# Patient Record
Sex: MALE | Race: OTHER | HISPANIC OR LATINO | Employment: OTHER | URBAN - METROPOLITAN AREA
[De-identification: names, ages, dates, MRNs, and addresses within clinical notes are randomized per-mention and may not be internally consistent; named-entity substitution may affect disease eponyms.]

---

## 2017-07-19 ENCOUNTER — HOSPITAL ENCOUNTER (EMERGENCY)
Facility: HOSPITAL | Age: 67
Discharge: HOME | End: 2017-07-20
Attending: EMERGENCY MEDICINE

## 2017-07-19 ENCOUNTER — APPOINTMENT (EMERGENCY)
Dept: RADIOLOGY | Facility: HOSPITAL | Age: 67
End: 2017-07-19
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
## 2017-07-19 LAB
ALBUMIN SERPL BCP-MCNC: 4 G/DL (ref 3.5–5)
ALP SERPL-CCNC: 66 U/L (ref 46–116)
ALT SERPL W P-5'-P-CCNC: 16 U/L (ref 12–78)
ANION GAP SERPL CALCULATED.3IONS-SCNC: 7 MMOL/L (ref 4–13)
AST SERPL W P-5'-P-CCNC: 6 U/L (ref 5–45)
BASOPHILS # BLD AUTO: 0.03 THOUSANDS/ΜL (ref 0–0.1)
BASOPHILS NFR BLD AUTO: 0 % (ref 0–1)
BILIRUB SERPL-MCNC: 0.23 MG/DL (ref 0.2–1)
BUN SERPL-MCNC: 35 MG/DL (ref 5–25)
CALCIUM SERPL-MCNC: 9.5 MG/DL (ref 8.3–10.1)
CHLORIDE SERPL-SCNC: 108 MMOL/L (ref 100–108)
CO2 SERPL-SCNC: 25 MMOL/L (ref 21–32)
CREAT SERPL-MCNC: 1.93 MG/DL (ref 0.6–1.3)
EOSINOPHIL # BLD AUTO: 0.21 THOUSAND/ΜL (ref 0–0.61)
EOSINOPHIL NFR BLD AUTO: 3 % (ref 0–6)
ERYTHROCYTE [DISTWIDTH] IN BLOOD BY AUTOMATED COUNT: 12.6 % (ref 11.6–15.1)
GFR SERPL CREATININE-BSD FRML MDRD: 34.8 ML/MIN/1.73SQ M
GLUCOSE SERPL-MCNC: 125 MG/DL (ref 65–140)
HCT VFR BLD AUTO: 33.4 % (ref 36.5–49.3)
HGB BLD-MCNC: 10.9 G/DL (ref 12–17)
LIPASE SERPL-CCNC: 87 U/L (ref 73–393)
LYMPHOCYTES # BLD AUTO: 2.99 THOUSANDS/ΜL (ref 0.6–4.47)
LYMPHOCYTES NFR BLD AUTO: 38 % (ref 14–44)
MCH RBC QN AUTO: 29.2 PG (ref 26.8–34.3)
MCHC RBC AUTO-ENTMCNC: 32.6 G/DL (ref 31.4–37.4)
MCV RBC AUTO: 90 FL (ref 82–98)
MONOCYTES # BLD AUTO: 0.52 THOUSAND/ΜL (ref 0.17–1.22)
MONOCYTES NFR BLD AUTO: 7 % (ref 4–12)
NEUTROPHILS # BLD AUTO: 4.19 THOUSANDS/ΜL (ref 1.85–7.62)
NEUTS SEG NFR BLD AUTO: 52 % (ref 43–75)
NRBC BLD AUTO-RTO: 0 /100 WBCS
PLATELET # BLD AUTO: 177 THOUSANDS/UL (ref 149–390)
PMV BLD AUTO: 12.2 FL (ref 8.9–12.7)
POTASSIUM SERPL-SCNC: 4.4 MMOL/L (ref 3.5–5.3)
PROT SERPL-MCNC: 7.2 G/DL (ref 6.4–8.2)
RBC # BLD AUTO: 3.73 MILLION/UL (ref 3.88–5.62)
SODIUM SERPL-SCNC: 140 MMOL/L (ref 136–145)
TROPONIN I SERPL-MCNC: 0.05 NG/ML
WBC # BLD AUTO: 7.95 THOUSAND/UL (ref 4.31–10.16)

## 2017-07-19 PROCEDURE — 83690 ASSAY OF LIPASE: CPT | Performed by: EMERGENCY MEDICINE

## 2017-07-19 PROCEDURE — 36415 COLL VENOUS BLD VENIPUNCTURE: CPT

## 2017-07-19 PROCEDURE — 84484 ASSAY OF TROPONIN QUANT: CPT | Performed by: EMERGENCY MEDICINE

## 2017-07-19 PROCEDURE — 70450 CT HEAD/BRAIN W/O DYE: CPT

## 2017-07-19 PROCEDURE — 85025 COMPLETE CBC W/AUTO DIFF WBC: CPT | Performed by: EMERGENCY MEDICINE

## 2017-07-19 PROCEDURE — 71020 HB CHEST X-RAY 2VW FRONTAL&LATL: CPT

## 2017-07-19 PROCEDURE — 80053 COMPREHEN METABOLIC PANEL: CPT | Performed by: EMERGENCY MEDICINE

## 2017-07-19 PROCEDURE — 93005 ELECTROCARDIOGRAM TRACING: CPT | Performed by: EMERGENCY MEDICINE

## 2017-07-20 VITALS
DIASTOLIC BLOOD PRESSURE: 59 MMHG | RESPIRATION RATE: 18 BRPM | SYSTOLIC BLOOD PRESSURE: 132 MMHG | HEART RATE: 64 BPM | OXYGEN SATURATION: 97 % | WEIGHT: 180 LBS

## 2017-07-20 LAB
ATRIAL RATE: 69 BPM
HOLD SPECIMEN: NORMAL
P AXIS: 75 DEGREES
PR INTERVAL: 158 MS
QRS AXIS: 74 DEGREES
QRSD INTERVAL: 88 MS
QT INTERVAL: 408 MS
QTC INTERVAL: 437 MS
T WAVE AXIS: 91 DEGREES
TROPONIN I SERPL-MCNC: 0.04 NG/ML
VENTRICULAR RATE: 69 BPM

## 2017-07-20 PROCEDURE — 84484 ASSAY OF TROPONIN QUANT: CPT | Performed by: EMERGENCY MEDICINE

## 2017-07-20 PROCEDURE — 36415 COLL VENOUS BLD VENIPUNCTURE: CPT | Performed by: EMERGENCY MEDICINE

## 2017-07-20 PROCEDURE — 99285 EMERGENCY DEPT VISIT HI MDM: CPT

## 2019-10-20 ENCOUNTER — HOSPITAL ENCOUNTER (OUTPATIENT)
Facility: HOSPITAL | Age: 69
Setting detail: OBSERVATION
Discharge: HOME | End: 2019-10-22
Attending: EMERGENCY MEDICINE | Admitting: FAMILY MEDICINE
Payer: COMMERCIAL

## 2019-10-20 DIAGNOSIS — R77.8 ELEVATED TROPONIN: Primary | ICD-10-CM

## 2019-10-20 DIAGNOSIS — S71.001A: ICD-10-CM

## 2019-10-20 DIAGNOSIS — I10 HYPERTENSION, UNCONTROLLED: ICD-10-CM

## 2019-10-20 DIAGNOSIS — S71.002A: ICD-10-CM

## 2019-10-20 PROCEDURE — 99285 EMERGENCY DEPT VISIT HI MDM: CPT

## 2019-10-20 PROCEDURE — 99285 EMERGENCY DEPT VISIT HI MDM: CPT | Performed by: EMERGENCY MEDICINE

## 2019-10-21 ENCOUNTER — APPOINTMENT (EMERGENCY)
Dept: CT IMAGING | Facility: HOSPITAL | Age: 69
End: 2019-10-21
Payer: COMMERCIAL

## 2019-10-21 PROBLEM — K21.9 GERD (GASTROESOPHAGEAL REFLUX DISEASE): Status: ACTIVE | Noted: 2019-10-21

## 2019-10-21 PROBLEM — I10 HYPERTENSION: Status: ACTIVE | Noted: 2019-10-21

## 2019-10-21 PROBLEM — R11.10 VOMITING: Status: ACTIVE | Noted: 2019-10-21

## 2019-10-21 PROBLEM — G20 PARKINSON DISEASE (HCC): Status: ACTIVE | Noted: 2019-10-21

## 2019-10-21 PROBLEM — E78.5 HYPERLIPIDEMIA: Status: ACTIVE | Noted: 2019-10-21

## 2019-10-21 PROBLEM — S71.001A OPEN WOUND OF RIGHT HIP: Status: ACTIVE | Noted: 2019-10-21

## 2019-10-21 PROBLEM — K59.00 CONSTIPATION: Status: ACTIVE | Noted: 2019-10-21

## 2019-10-21 PROBLEM — R77.8 ELEVATED TROPONIN: Status: ACTIVE | Noted: 2019-10-21

## 2019-10-21 PROBLEM — S71.002A: Status: ACTIVE | Noted: 2019-10-21

## 2019-10-21 LAB
ALBUMIN SERPL BCP-MCNC: 4 G/DL (ref 3.5–5)
ALP SERPL-CCNC: 95 U/L (ref 46–116)
ALT SERPL W P-5'-P-CCNC: 15 U/L (ref 12–78)
ANION GAP SERPL CALCULATED.3IONS-SCNC: 9 MMOL/L (ref 4–13)
AST SERPL W P-5'-P-CCNC: 15 U/L (ref 5–45)
BASOPHILS # BLD AUTO: 0.05 THOUSANDS/ΜL (ref 0–0.1)
BASOPHILS NFR BLD AUTO: 1 % (ref 0–1)
BILIRUB SERPL-MCNC: 0.33 MG/DL (ref 0.2–1)
BUN SERPL-MCNC: 19 MG/DL (ref 5–25)
CALCIUM SERPL-MCNC: 9 MG/DL (ref 8.3–10.1)
CHLORIDE SERPL-SCNC: 101 MMOL/L (ref 100–108)
CO2 SERPL-SCNC: 27 MMOL/L (ref 21–32)
CREAT SERPL-MCNC: 1.44 MG/DL (ref 0.6–1.3)
EOSINOPHIL # BLD AUTO: 0.17 THOUSAND/ΜL (ref 0–0.61)
EOSINOPHIL NFR BLD AUTO: 2 % (ref 0–6)
ERYTHROCYTE [DISTWIDTH] IN BLOOD BY AUTOMATED COUNT: 11.9 % (ref 11.6–15.1)
GFR SERPL CREATININE-BSD FRML MDRD: 49 ML/MIN/1.73SQ M
GLUCOSE SERPL-MCNC: 151 MG/DL (ref 65–140)
HCT VFR BLD AUTO: 34.5 % (ref 36.5–49.3)
HGB BLD-MCNC: 11.2 G/DL (ref 12–17)
IMM GRANULOCYTES # BLD AUTO: 0.03 THOUSAND/UL (ref 0–0.2)
IMM GRANULOCYTES NFR BLD AUTO: 0 % (ref 0–2)
LACTATE SERPL-SCNC: 1.6 MMOL/L (ref 0.5–2)
LIPASE SERPL-CCNC: 84 U/L (ref 73–393)
LYMPHOCYTES # BLD AUTO: 1.52 THOUSANDS/ΜL (ref 0.6–4.47)
LYMPHOCYTES NFR BLD AUTO: 18 % (ref 14–44)
MCH RBC QN AUTO: 29.8 PG (ref 26.8–34.3)
MCHC RBC AUTO-ENTMCNC: 32.5 G/DL (ref 31.4–37.4)
MCV RBC AUTO: 92 FL (ref 82–98)
MONOCYTES # BLD AUTO: 0.45 THOUSAND/ΜL (ref 0.17–1.22)
MONOCYTES NFR BLD AUTO: 5 % (ref 4–12)
NEUTROPHILS # BLD AUTO: 6.09 THOUSANDS/ΜL (ref 1.85–7.62)
NEUTS SEG NFR BLD AUTO: 74 % (ref 43–75)
NRBC BLD AUTO-RTO: 0 /100 WBCS
PLATELET # BLD AUTO: 223 THOUSANDS/UL (ref 149–390)
PMV BLD AUTO: 11.5 FL (ref 8.9–12.7)
POTASSIUM SERPL-SCNC: 4.8 MMOL/L (ref 3.5–5.3)
PROT SERPL-MCNC: 7.5 G/DL (ref 6.4–8.2)
RBC # BLD AUTO: 3.76 MILLION/UL (ref 3.88–5.62)
SODIUM SERPL-SCNC: 137 MMOL/L (ref 136–145)
TROPONIN I SERPL-MCNC: 0.11 NG/ML
TROPONIN I SERPL-MCNC: 0.12 NG/ML
TROPONIN I SERPL-MCNC: 0.13 NG/ML
WBC # BLD AUTO: 8.31 THOUSAND/UL (ref 4.31–10.16)

## 2019-10-21 PROCEDURE — 96360 HYDRATION IV INFUSION INIT: CPT

## 2019-10-21 PROCEDURE — 84484 ASSAY OF TROPONIN QUANT: CPT | Performed by: PHYSICIAN ASSISTANT

## 2019-10-21 PROCEDURE — 93005 ELECTROCARDIOGRAM TRACING: CPT

## 2019-10-21 PROCEDURE — 99204 OFFICE O/P NEW MOD 45 MIN: CPT | Performed by: INTERNAL MEDICINE

## 2019-10-21 PROCEDURE — 92610 EVALUATE SWALLOWING FUNCTION: CPT

## 2019-10-21 PROCEDURE — 83690 ASSAY OF LIPASE: CPT | Performed by: EMERGENCY MEDICINE

## 2019-10-21 PROCEDURE — 80053 COMPREHEN METABOLIC PANEL: CPT | Performed by: EMERGENCY MEDICINE

## 2019-10-21 PROCEDURE — 36415 COLL VENOUS BLD VENIPUNCTURE: CPT | Performed by: EMERGENCY MEDICINE

## 2019-10-21 PROCEDURE — 74177 CT ABD & PELVIS W/CONTRAST: CPT

## 2019-10-21 PROCEDURE — 83605 ASSAY OF LACTIC ACID: CPT | Performed by: EMERGENCY MEDICINE

## 2019-10-21 PROCEDURE — G8998 SWALLOW D/C STATUS: HCPCS

## 2019-10-21 PROCEDURE — 85025 COMPLETE CBC W/AUTO DIFF WBC: CPT | Performed by: EMERGENCY MEDICINE

## 2019-10-21 PROCEDURE — G8997 SWALLOW GOAL STATUS: HCPCS

## 2019-10-21 PROCEDURE — G8996 SWALLOW CURRENT STATUS: HCPCS

## 2019-10-21 PROCEDURE — 84484 ASSAY OF TROPONIN QUANT: CPT | Performed by: EMERGENCY MEDICINE

## 2019-10-21 PROCEDURE — 99220 PR INITIAL OBSERVATION CARE/DAY 70 MINUTES: CPT | Performed by: STUDENT IN AN ORGANIZED HEALTH CARE EDUCATION/TRAINING PROGRAM

## 2019-10-21 RX ORDER — BISACODYL 10 MG
10 SUPPOSITORY, RECTAL RECTAL DAILY PRN
COMMUNITY
Start: 2019-08-30

## 2019-10-21 RX ORDER — BISACODYL 10 MG
10 SUPPOSITORY, RECTAL RECTAL DAILY PRN
Status: DISCONTINUED | OUTPATIENT
Start: 2019-10-21 | End: 2019-10-22 | Stop reason: HOSPADM

## 2019-10-21 RX ORDER — LISINOPRIL 10 MG/1
10 TABLET ORAL DAILY
COMMUNITY
Start: 2019-08-29 | End: 2020-10-27 | Stop reason: HOSPADM

## 2019-10-21 RX ORDER — CALCIUM CARBONATE 200(500)MG
1000 TABLET,CHEWABLE ORAL DAILY PRN
Status: DISCONTINUED | OUTPATIENT
Start: 2019-10-21 | End: 2019-10-22 | Stop reason: HOSPADM

## 2019-10-21 RX ORDER — FAMOTIDINE 20 MG/1
20 TABLET, FILM COATED ORAL DAILY
Status: DISCONTINUED | OUTPATIENT
Start: 2019-10-21 | End: 2019-10-22 | Stop reason: HOSPADM

## 2019-10-21 RX ORDER — ONDANSETRON 2 MG/ML
4 INJECTION INTRAMUSCULAR; INTRAVENOUS EVERY 6 HOURS PRN
Status: DISCONTINUED | OUTPATIENT
Start: 2019-10-21 | End: 2019-10-22 | Stop reason: HOSPADM

## 2019-10-21 RX ORDER — ACETAMINOPHEN 325 MG/1
650 TABLET ORAL EVERY 4 HOURS PRN
Status: DISCONTINUED | OUTPATIENT
Start: 2019-10-21 | End: 2019-10-22 | Stop reason: HOSPADM

## 2019-10-21 RX ORDER — POLYETHYLENE GLYCOL 3350 17 G/17G
17 POWDER, FOR SOLUTION ORAL DAILY PRN
Status: DISCONTINUED | OUTPATIENT
Start: 2019-10-21 | End: 2019-10-22 | Stop reason: HOSPADM

## 2019-10-21 RX ORDER — TRAZODONE HYDROCHLORIDE 50 MG/1
150 TABLET ORAL
COMMUNITY
Start: 2019-09-09

## 2019-10-21 RX ORDER — ROSUVASTATIN CALCIUM 20 MG/1
20 TABLET, COATED ORAL DAILY
Status: ON HOLD | COMMUNITY
Start: 2019-08-29 | End: 2020-10-23

## 2019-10-21 RX ORDER — SENNOSIDES 8.6 MG
1 TABLET ORAL
Status: DISCONTINUED | OUTPATIENT
Start: 2019-10-21 | End: 2019-10-22 | Stop reason: HOSPADM

## 2019-10-21 RX ORDER — ASPIRIN 81 MG/1
81 TABLET, CHEWABLE ORAL DAILY
Status: DISCONTINUED | OUTPATIENT
Start: 2019-10-21 | End: 2019-10-22 | Stop reason: HOSPADM

## 2019-10-21 RX ORDER — QUETIAPINE FUMARATE 25 MG/1
25 TABLET, FILM COATED ORAL
COMMUNITY
Start: 2019-07-02 | End: 2020-10-15 | Stop reason: CLARIF

## 2019-10-21 RX ORDER — ATORVASTATIN CALCIUM 40 MG/1
40 TABLET, FILM COATED ORAL
Status: DISCONTINUED | OUTPATIENT
Start: 2019-10-21 | End: 2019-10-22 | Stop reason: HOSPADM

## 2019-10-21 RX ORDER — LISINOPRIL 10 MG/1
10 TABLET ORAL DAILY
Status: DISCONTINUED | OUTPATIENT
Start: 2019-10-21 | End: 2019-10-22 | Stop reason: HOSPADM

## 2019-10-21 RX ORDER — CARVEDILOL 6.25 MG/1
3.12 TABLET ORAL 2 TIMES DAILY WITH MEALS
Status: DISCONTINUED | OUTPATIENT
Start: 2019-10-21 | End: 2019-10-22 | Stop reason: HOSPADM

## 2019-10-21 RX ORDER — TRAZODONE HYDROCHLORIDE 100 MG/1
100 TABLET ORAL
Status: DISCONTINUED | OUTPATIENT
Start: 2019-10-21 | End: 2019-10-22 | Stop reason: HOSPADM

## 2019-10-21 RX ORDER — HEPARIN SODIUM 5000 [USP'U]/ML
5000 INJECTION, SOLUTION INTRAVENOUS; SUBCUTANEOUS EVERY 8 HOURS SCHEDULED
Status: DISCONTINUED | OUTPATIENT
Start: 2019-10-21 | End: 2019-10-22 | Stop reason: HOSPADM

## 2019-10-21 RX ORDER — QUETIAPINE FUMARATE 25 MG/1
25 TABLET, FILM COATED ORAL
Status: DISCONTINUED | OUTPATIENT
Start: 2019-10-21 | End: 2019-10-22 | Stop reason: HOSPADM

## 2019-10-21 RX ORDER — LUBIPROSTONE 8 UG/1
8 CAPSULE, GELATIN COATED ORAL 2 TIMES DAILY WITH MEALS
Status: DISCONTINUED | OUTPATIENT
Start: 2019-10-21 | End: 2019-10-22 | Stop reason: HOSPADM

## 2019-10-21 RX ORDER — RANITIDINE 150 MG/1
150 TABLET ORAL DAILY
COMMUNITY
Start: 2019-06-08 | End: 2020-10-15 | Stop reason: CLARIF

## 2019-10-21 RX ADMIN — CARVEDILOL 3.12 MG: 6.25 TABLET, FILM COATED ORAL at 08:39

## 2019-10-21 RX ADMIN — ASPIRIN 81 MG 81 MG: 81 TABLET ORAL at 08:39

## 2019-10-21 RX ADMIN — HEPARIN SODIUM 5000 UNITS: 5000 INJECTION INTRAVENOUS; SUBCUTANEOUS at 05:26

## 2019-10-21 RX ADMIN — LISINOPRIL 10 MG: 10 TABLET ORAL at 08:26

## 2019-10-21 RX ADMIN — CARBIDOPA AND LEVODOPA 1 TABLET: 25; 100 TABLET ORAL at 08:26

## 2019-10-21 RX ADMIN — CARVEDILOL 3.12 MG: 6.25 TABLET, FILM COATED ORAL at 15:58

## 2019-10-21 RX ADMIN — SODIUM CHLORIDE 1000 ML: 0.9 INJECTION, SOLUTION INTRAVENOUS at 01:21

## 2019-10-21 RX ADMIN — IODIXANOL 100 ML: 320 INJECTION, SOLUTION INTRAVASCULAR at 01:13

## 2019-10-21 RX ADMIN — HEPARIN SODIUM 5000 UNITS: 5000 INJECTION INTRAVENOUS; SUBCUTANEOUS at 13:32

## 2019-10-21 RX ADMIN — CARBIDOPA AND LEVODOPA 1 TABLET: 25; 100 TABLET ORAL at 21:00

## 2019-10-21 RX ADMIN — ATORVASTATIN CALCIUM 40 MG: 40 TABLET, FILM COATED ORAL at 15:58

## 2019-10-21 RX ADMIN — QUETIAPINE FUMARATE 25 MG: 25 TABLET ORAL at 21:01

## 2019-10-21 RX ADMIN — HEPARIN SODIUM 5000 UNITS: 5000 INJECTION INTRAVENOUS; SUBCUTANEOUS at 21:02

## 2019-10-21 RX ADMIN — LUBIPROSTONE 8 MCG: 8 CAPSULE, GELATIN COATED ORAL at 08:26

## 2019-10-21 RX ADMIN — TRAZODONE HYDROCHLORIDE 100 MG: 100 TABLET ORAL at 21:01

## 2019-10-21 RX ADMIN — CARBIDOPA AND LEVODOPA 1 TABLET: 25; 100 TABLET ORAL at 15:26

## 2019-10-21 RX ADMIN — SENNOSIDES 8.6 MG: 8.6 TABLET, FILM COATED ORAL at 21:01

## 2019-10-21 RX ADMIN — FAMOTIDINE 20 MG: 20 TABLET ORAL at 08:26

## 2019-10-21 NOTE — ASSESSMENT & PLAN NOTE
Patient reportedly brought into ED for two episodes of vomiting yesterday   Likely self limiting viral illness vs  CAD vs  Association with chronic dysphagia   Continue to monitor for further episodes of vomiting   PRN Zofran   IV fluid hydration

## 2019-10-21 NOTE — SPEECH THERAPY NOTE
Speech Language/Pathology  Speech/Language Pathology  Assessment    Patient Name: Urszula Vieirajesusmorel  GRZAB'V Date: 10/21/2019     Problem List  Principal Problem:    Elevated troponin  Active Problems:    Hyperlipidemia    Hypertension    Parkinson disease (HCC)    Constipation    GERD (gastroesophageal reflux disease)    Open wound of right hip    Vomiting    Past Medical History  Past Medical History:   Diagnosis Date    [facility]     Ambulatory dysfunction     CAD (coronary artery disease)     [facility])     Constipation     Dementia ([facility] )     Diabetes mellitus ([place] [address] )     GERD (gastroesophageal reflux disease)     Hyperlipidemia     Hypertension     NSTEMI (non-ST elevated myocardial infarction) ([facility] )     Parkinson disease ([place] [address] )     Renal disorder      Past Surgical History  Past Surgical History:   Procedure Laterality Date    BACK SURGERY       Attestation signed by Laurie Sylvester MD at 10/21/2019 11:34 AM (Updated)     Split/Shared Statement  I saw/examined the patient  I agree with the Advanced Practitioner's note with the following additions/exceptions:   78year old male with parkinson's dementia, HTN, HLD, Chronic constipation, CAD, presenting with vomiting  He is non-verbal at baseline and no ROS could be performed  Does not seem to present with any signs of active sepsis  Incidentally found to have troponin elevation  Suspect that vomiting possibly due to gastroenteritis since he tolerated his AM meal  No evidence of obstruction  Will request speech and swallow eval to see if this is associated with his parkinson's disease  Will administer stool softener for now  Awaiting final cardiology recommendations         Chief Complaint:   vomiting  History of Present Illness:  Renetta Jeter is a 78 y o  male with past medical history of Parkinson's dementia, hypertension, hyperlipidemia, GERD chronic constipation, CAD who presents with vomiting  Patient nonverbal at baseline, HPI obtained through chart review  Patient was reportedly brought to ED by family for 2 episodes of vomiting today  Patient also noted to have wound on lateral right thigh which has been intermittently draining  Family reports patient is approximately at mental baseline  Denies recent fever or chills  Patient smiles and makes appropriate eye contact on exam     Per esophagram [facility] 8/12/19:  Thoracic esophagus:  Normal primary esophageal peristalsis  However, there are increased tertiary  waves seen in the thoracic esophagus as well  No annular obstructing lesion,  ulceration, thickened folds, persistent intraluminal filling defect, hiatal  hernia or gastroesophageal reflux  IMPRESSION:  Impression:  1  Mild dysmotility with increased tertiary waves  2  No annular obstructing lesion, ulceration or gastroesophageal reflux  VBS 2/14/19 at [facility]:  ASSESSMENT  Mild oropharyngeal phase dysphagia characterized by decreased mastication, slow oral transit, oropharyngeal delay with transient penetration of thin liquids prior to swallow, no aspiration  PLAN  Therapy Assessment/Plan (SLP)  SLP Received On: 02/14/19  SLP Communication: VFSS completed 2/14/19, recommend dysphagia level 3/thins  Swallowing Diagnosis: mild, oropharyngeal phase dysphagia  SLP Diet Recommendation: Dysphagia Level 3 Advanced, Thin Liquids   DIET Recommendations:  SLP Diet Recommendation: Dysphagia Level 3 Advanced, Thin Liquids   Recommended Form of Meds:  []As best tolerated []In Nectar Thick Liquid Form []Whole in Puree [x]Crushed in Puree []Non-Oral   Precautions:  [x]Head of bed at 90* for meals/medications   [x]Supervision with P O  Close   [x]Assist with P O  Assist as Needed   Strategies:  [x]Alternate food with liquids   [x]Small sips/Bites, One at a time       Bedside Swallow Evaluation:    Summary:  Pt presents w/ prolonged mastication, otherwise oral and pharyngeal stages were wnl   Son educated on slow rate, small bites, and small sips in between  Per vbs at [facility] it was recommended that meds be crushed  Recommendations:  Diet: dysphagia 3/dental soft  Liquid:thin  Meds:crush  Supervision: complete  Positioning:Upright  Strategies: Pt to take PO/Meds only when fully alert and upright  Feed slowly, sips on between bites, keep hob elevated to ~ 30 degrees at rest   Oral care  Aspiration precautions  Reflux precautions  Eval only, No f/u tx indicated  Patient's goal: none stated, speaks very little per son    Consider consult w/:  Nutrition    Reason for consult:  R/o aspiration  Determine safest and least restrictive diet  H/o neurological disease  h/o dysphagia   ? If vomiting was po related  Current diet:  Dysphagia 3 w/ thin  Premorbid diet[de-identified]  Dysphagia 3 w/ thin  Previous VBS:  Noted above  O2 requirement:  none  Voice/Speech:  Nonverbal today  Speaks very little per son  Social:  Home w/ family  Follows commands:  poorly                  Cognitive Status:  alert  Oral mech exam:  Dentition:natural  Labial strength and ROM:appears wfl/wnl  Lingual strength and ROM:appears wfl/wnl  Mandibular strength and ROM:appears wfl/wnl  Secretion management:wnl    Items administered:  Stuffed shells, chopped broccoli  Liquids were taken by straw  Oral stage:  Mild  Lip closure:wnl  Mastication:prolonged but functional  Bolus formation: wfl/wnl  Bolus control:wfl/wnl  Transfer:wnl  Oral residue:no obvious residue  Pocketing:none    Pharyngeal stage:   WNL  Swallow promptness:+  Laryngeal rise:+  Wet voice:-  Throat clear:-  Cough:-  Secondary swallows:-  Audible swallows:-  No overt s/s aspiration    Esophageal stage:  See esophagram report above    Aspiration precautions posted    Results d/w:  Pt, nursing,physician

## 2019-10-21 NOTE — NURSING NOTE
Message sent to Dr Abbie Wilde to make aware that speech therapy recommended to crush meds, but the amitiza cannot be crushed  I therefor did not give him this evenings dose

## 2019-10-21 NOTE — PLAN OF CARE
Problem: Prexisting or High Potential for Compromised Skin Integrity  Goal: Skin integrity is maintained or improved  Description  INTERVENTIONS:  - Identify patients at risk for skin breakdown  - Assess and monitor skin integrity  - Assess and monitor nutrition and hydration status  - Monitor labs   - Assess for incontinence   - Turn and reposition patient  - Assist with mobility/ambulation  - Relieve pressure over bony prominences  - Avoid friction and shearing  - Provide appropriate hygiene as needed including keeping skin clean and dry  - Evaluate need for skin moisturizer/barrier cream  - Collaborate with interdisciplinary team   - Patient/family teaching  - Consider wound care consult   Outcome: Progressing     Problem: Potential for Falls  Goal: Patient will remain free of falls  Description  INTERVENTIONS:  - Assess patient frequently for physical needs  -  Identify cognitive and physical deficits and behaviors that affect risk of falls    -  [place] fall precautions as indicated by assessment   - Educate patient/family on patient safety including physical limitations  - Instruct patient to call for assistance with activity based on assessment  - Modify environment to reduce risk of injury  - Consider OT/PT consult to assist with strengthening/mobility  Outcome: Progressing     Problem: DISCHARGE PLANNING  Goal: Discharge to home or other facility with appropriate resources  Description  INTERVENTIONS:  - Identify barriers to discharge w/patient and caregiver  - Arrange for needed discharge resources and transportation as appropriate  - Identify discharge learning needs (meds, wound care, etc )  - Arrange for interpretive services to assist at discharge as needed  - Refer to Case Management Department for coordinating discharge planning if the patient needs post-hospital services based on physician/advanced practitioner order or complex needs related to functional status, cognitive ability, or social support system  Outcome: Progressing     Problem: Knowledge Deficit  Goal: Patient/family/caregiver demonstrates understanding of disease process, treatment plan, medications, and discharge instructions  Description  Complete learning assessment and assess knowledge base    Interventions:  - Provide teaching at level of understanding  - Provide teaching via preferred learning methods  Outcome: Progressing

## 2019-10-21 NOTE — ED PROVIDER NOTES
History  Chief Complaint   Patient presents with    Vomiting     Per pt family pt started vomiting today and had 2x episodes  Family deny any other complaints  Pt nonverbal per family  A 54-year-old male with past medical history of Alzheimer's disease, CAD, CHF, constipation, dementia, diabetes, GERD, hyperlipidemia, hypertension, Parkinson's and CKD; presents with vomiting  History is provided by the patient's family, as the patient is nonverbal secondary to his dementia  Family states last week they noticed a wound to the lateral aspect of his right leg, which has been intermittently draining pus  Family states today the patient had two episodes of nonbloody nonbilious emesis  Patient has otherwise not had fever, cough, increased work of breathing or diarrhea  Family states the patient is at his baseline mental status  Patient is essentially bed-bound, cared for by family  Complete history and review of systems are unobtainable from the patient secondary to his dementia  A/P:  Vomiting, also with a wound to the lateral aspect of the right hip  Benign abdominal exam   Concern for soft tissue infection given wound appearance, will check lab work and CT scan for further evaluation  Patient also found to be significantly hypertensive on arrival, improved once brought back into the room  Will check EKG and lab work for end-organ damage  History provided by:  Spouse and relative   used: Yes ([employer])    Vomiting       Prior to Admission Medications   Prescriptions Last Dose Informant Patient Reported? Taking?    QUEtiapine (SEROquel) 25 mg tablet 10/22/2019 at Unknown time  Yes Yes   Sig: Take 25 mg by mouth   bisacodyl (DULCOLAX) 10 mg suppository 10/21/2019 at Unknown time  Yes Yes   Sig: Insert 10 mg into the rectum daily as needed for constipation   carbidopa-levodopa (SINEMET)  mg per tablet 10/22/2019 at Unknown time  Yes Yes   Sig: Take 1 tablet by mouth Three times a day   linaCLOtide (LINZESS) 72 MCG CAPS 10/22/2019 at Unknown time  Yes Yes   Sig: Take 72 mcg by mouth daily   lisinopril (ZESTRIL) 10 mg tablet 10/22/2019 at Unknown time  Yes Yes   Sig: Take 10 mg by mouth daily   metFORMIN (GLUCOPHAGE) 1000 MG tablet 10/21/2019 at Unknown time Child Yes Yes   Sig: Take 1,000 mg by mouth daily with breakfast   ranitidine (ZANTAC) 150 mg tablet 10/22/2019 at Unknown time  Yes Yes   Sig: Take 150 mg by mouth daily   rosuvastatin (CRESTOR) 20 MG tablet 10/22/2019 at Unknown time  Yes Yes   Sig: Take 20 mg by mouth daily   traZODone (DESYREL) 50 mg tablet 10/22/2019 at Unknown time  Yes Yes   Sig: Take 100 mg by mouth daily at bedtime      Facility-Administered Medications: None       Past Medical History:   Diagnosis Date    Alzheimer disease ([place] [address] )     Ambulatory dysfunction     CAD (coronary artery disease)     [facility])     Constipation     Dementia ([place] [address] )     Diabetes mellitus ([place] [address] )     GERD (gastroesophageal reflux disease)     Hyperlipidemia     Hypertension     NSTEMI (non-ST elevated myocardial infarction) ([place] [address] )     Parkinson disease ([place] [address] )     Renal disorder        Past Surgical History:   Procedure Laterality Date    BACK SURGERY         History reviewed  No pertinent family history  I have reviewed and agree with the history as documented  Social History     Tobacco Use    Smoking status: Former Smoker   Substance Use Topics    Alcohol use: Not Currently    Drug use: Never        Review of Systems   Unable to perform ROS: Dementia   Gastrointestinal: Positive for vomiting  Physical Exam  Physical Exam  General Appearance: awake and alert, nonverbal at baseline; nad, non toxic appearing  Skin:  Warm, dry  2 cm circular wound to right lateral hip/proximal thigh  No purulent drainage at this time  No surrounding erythema    HEENT: atraumatic, normocephalic  Neck: Supple, trachea midline  Cardiac: RRR; no murmurs, rub, gallops  Pulmonary: lungs CTAB; no wheezes, rales, rhonchi  Gastrointestinal: abdomen soft, nontender, nondistended; no guarding or rebound tenderness; good bowel sounds, no mass or bruits  Extremities:  no pedal edema, 2+ pulses; no calf tenderness, no clubbing, no cyanosis  Neuro:  no focal motor or sensory deficits, CN 2-12 grossly intact  Psych:  Nonverbal at baseline      Vital Signs  ED Triage Vitals   Temperature Pulse Respirations Blood Pressure SpO2   10/20/19 2344 10/20/19 2344 10/20/19 2344 10/20/19 2344 10/20/19 2344   (!) 97 4 °F (36 3 °C) 63 18 (!) 214/95 100 %      Temp Source Heart Rate Source Patient Position - Orthostatic VS BP Location FiO2 (%)   10/21/19 0403 10/20/19 2344 10/21/19 0233 10/20/19 2344 --   Temporal Monitor Lying Right arm       Pain Score       --                  Vitals:    10/21/19 2040 10/21/19 2333 10/22/19 0701 10/22/19 1219   BP: (!) 178/86 128/86 162/72 156/66   Pulse: 74 70 62 70   Patient Position - Orthostatic VS: Lying Lying Lying Lying         Visual Acuity      ED Medications  Medications   sodium chloride 0 9 % bolus 1,000 mL (0 mL Intravenous Stopped 10/21/19 0430)   iodixanol (VISIPAQUE) 320 MG/ML injection 100 mL (100 mL Intravenous Given 10/21/19 0113)       Diagnostic Studies  Results Reviewed     Procedure Component Value Units Date/Time    Lactic acid, plasma x2 [[id number]]  (Normal) Collected:  10/21/19 0016    Lab Status:  Final result Specimen:  Blood from Arm, Right Updated:  10/21/19 0049     LACTIC ACID 1 6 mmol/L     Narrative:       Result may be elevated if tourniquet was used during collection      Troponin I [[id number]]  (Abnormal) Collected:  10/21/19 0016    Lab Status:  Final result Specimen:  Blood from Arm, Right Updated:  10/21/19 0043     Troponin I 0 13 ng/mL     Comprehensive metabolic panel [[id number]]  (Abnormal) Collected:  10/21/19 0016    Lab Status:  Final result Specimen:  Blood from Arm, Right Updated:  10/21/19 0040     Sodium 137 mmol/L      Potassium 4 8 mmol/L      Chloride 101 mmol/L      CO2 27 mmol/L      ANION GAP 9 mmol/L      BUN 19 mg/dL      Creatinine 1 44 mg/dL      Glucose 151 mg/dL      Calcium 9 0 mg/dL      AST 15 U/L      ALT 15 U/L      Alkaline Phosphatase 95 U/L      Total Protein 7 5 g/dL      Albumin 4 0 g/dL      Total Bilirubin 0 33 mg/dL      eGFR 49 ml/min/1 73sq m     Narrative:       [place] guidelines for Chronic Kidney Disease (CKD):     Stage 1 with normal or high GFR (GFR > 90 mL/min/1 73 square meters)    Stage 2 Mild CKD (GFR = 60-89 mL/min/1 73 square meters)    Stage 3A Moderate CKD (GFR = 45-59 mL/min/1 73 square meters)    Stage 3B Moderate CKD (GFR = 30-44 mL/min/1 73 square meters)    Stage 4 Severe CKD (GFR = 15-29 mL/min/1 73 square meters)    Stage 5 End Stage CKD (GFR <15 mL/min/1 73 square meters)  Note: GFR calculation is accurate only with a steady state creatinine    Lipase [[id number]]  (Normal) Collected:  10/21/19 0016    Lab Status:  Final result Specimen:  Blood from Arm, Right Updated:  10/21/19 0040     Lipase 84 u/L     CBC and differential [[id number]]  (Abnormal) Collected:  10/21/19 0016    Lab Status:  Final result Specimen:  Blood from Arm, Right Updated:  10/21/19 0026     WBC 8 31 Thousand/uL      RBC 3 76 Million/uL      Hemoglobin 11 2 g/dL      Hematocrit 34 5 %      MCV 92 fL      MCH 29 8 pg      MCHC 32 5 g/dL      RDW 11 9 %      MPV 11 5 fL      Platelets 415 Thousands/uL      nRBC 0 /100 WBCs      Neutrophils Relative 74 %      Immat GRANS % 0 %      Lymphocytes Relative 18 %      Monocytes Relative 5 %      Eosinophils Relative 2 %      Basophils Relative 1 %      Neutrophils Absolute 6 09 Thousands/µL      Immature Grans Absolute 0 03 Thousand/uL      Lymphocytes Absolute 1 52 Thousands/µL      Monocytes Absolute 0 45 Thousand/µL      Eosinophils Absolute 0 17 Thousand/µL      Basophils Absolute 0 05 Thousands/µL                  CT abdomen pelvis with contrast   Final Result by Clarisse Michael MD (10/21 9772)      Cutaneous defect and subcutaneous fat infiltration extending to the greater trochanter overlying the greater trochanter  No collection/abscess is visualized         Large amount of stool seen throughout the colon         Workstation performed: [id number]                    Procedures  Procedures   ECG 12 Lead Documentation  Date/Time: today/date: 10/23/2019  Performed by: Jovi Lott    ECG reviewed by me, the ED Provider: yes    Patient location:  ED   Previous ECG:  No old for comparison    Rate:  59  ECG rate assessment: normal    Rhythm: sinus bradycardia    Ectopy:  none    QRS axis:  Normal  Intervals: normal   Q waves: None   ST segments:  Normal  T waves: normal      Impression:  Sinus bradycardia, otherwise normal EKG         ED Course  ED Course as of Oct 23 1824   Mon Oct 21, 2019   4424 Without EKG changes, possibly related to HTN urgency   Troponin I(!): 0 13   0047 Improved from prior   Creatinine(!): 1 44   0053 Remainder of labs within normal limits                                  MDM    Disposition  Final diagnoses:   Elevated troponin   Hypertension, uncontrolled     Time reflects when diagnosis was documented in both MDM as applicable and the Disposition within this note     Time User Action Codes Description Comment    10/21/2019  2:33 AM Kitty Harris Add [R79 89] Elevated troponin     10/21/2019  2:33 AM Tomekadario ALEXANDER Add [I10] Hypertension, uncontrolled     10/21/2019  3:02 AM Carole Marrero Add [S71 002A] Open wound of left hip     10/21/2019  3:15 AM Carole Marrero Modify [S71 002A] Open wound of left hip     10/21/2019  3:15 AM Mariusz Rose Add [S71 001A] Open wound of right hip       ED Disposition     ED Disposition Condition Date/Time Comment    Admit Stable Mon Oct 21, 2019  2:32 AM Case was discussed with Carole MULLIGAN and the patient's admission status was agreed to be Admission Status: observation status to the service of Dr Ashlye Hernandez           Follow-up Information     Follow up With Specialties Details Why Contact Info    Cydney Barboza DO Internal Medicine Schedule an appointment as soon as possible for a visit in 3 day(s)  [address] [place] [address]            Discharge Medication List as of 10/22/2019  2:01 PM      CONTINUE these medications which have NOT CHANGED    Details   bisacodyl (DULCOLAX) 10 mg suppository Insert 10 mg into the rectum daily as needed for constipation, Starting Fri 8/30/2019, Historical Med      carbidopa-levodopa (SINEMET)  mg per tablet Take 1 tablet by mouth Three times a day, Starting Tue 7/2/2019, Until Wed 7/1/2020, Historical Med      linaCLOtide (LINZESS) 72 MCG CAPS Take 72 mcg by mouth daily, Starting Mon 6/17/2019, Historical Med      lisinopril (ZESTRIL) 10 mg tablet Take 10 mg by mouth daily, Starting Thu 8/29/2019, Historical Med      metFORMIN (GLUCOPHAGE) 1000 MG tablet Take 1,000 mg by mouth daily with breakfast, Historical Med      QUEtiapine (SEROquel) 25 mg tablet Take 25 mg by mouth, Starting Tue 7/2/2019, Historical Med      ranitidine (ZANTAC) 150 mg tablet Take 150 mg by mouth daily, Starting Sat 6/8/2019, Historical Med      rosuvastatin (CRESTOR) 20 MG tablet Take 20 mg by mouth daily, Starting Thu 8/29/2019, Until Fri 8/28/2020, Historical Med      traZODone (DESYREL) 50 mg tablet Take 100 mg by mouth daily at bedtime, Starting Mon 9/9/2019, Historical Med           Outpatient Discharge Orders   Discharge Diet     Activity as tolerated     Call provider for:  persistent nausea or vomiting     Call provider for:  severe uncontrolled pain     Call provider for:  difficulty breathing, headache or visual disturbances     Call provider for:  persistent dizziness or light-headedness       ED Provider  Electronically Signed by           Beth Agustin DO  10/23/19 3814

## 2019-10-21 NOTE — DISCHARGE INSTR - OTHER ORDERS
Wound Care:  1-Hydraguard lotion to bilateral sacrum, buttock and heels three times per day and as needed with incontinence care  2-Elevate heels off of bed with pillows to offload pressure  3-Offloading cushion in chair when out of bed  4-Moisturize skin daily with lotion  5-Turn/reposition every 2 hours for pressure re-distribution on skin--use positioning wedges  6-Right lateral thigh--cleanse with soap and water, pat dry  Apply Silvasorb gel to wound bed  Cover with non-adherent dressing  Change dressing daily  Keep wound covered at all times to prevent patient from re-opening wound  If wound does not heal in 2-4 weeks, consider follow-up at wound center--[phone]

## 2019-10-21 NOTE — NURSING NOTE
I attempted to call patient's son Marlon Gaviria for home med verification, and to see if he had the flu and pneumonia vaccines  No answer  Message left to return call

## 2019-10-21 NOTE — H&P
[address] [facility]  H&P- Patrick Vieirajesusmorel 1950, 78 y o  male MRN: [id number]    Unit/Bed#: GISELA Encounter: [id number]    Primary Care Provider: No primary care provider on file     Date and time admitted to hospital: 10/20/2019 11:43 PM        * Elevated troponin  Assessment & Plan  Troponin elevated to 0 13 at time of presentation   BP also elevated to 214/95, consider demand ischemia   Does have MI history in 2013 and 2016, no stents placed, medically managed   Continue ASA, statin and beta blocker  Trend troponins x 3   EKG without acute ST or T wave changes, repeat with third troponin   Cardiology consult     Vomiting  Assessment & Plan  Patient reportedly brought into ED for two episodes of vomiting yesterday   Likely self limiting viral illness vs  CAD vs  Association with chronic dysphagia   Continue to monitor for further episodes of vomiting   PRN Zofran   IV fluid hydration     Open wound of left hip  Assessment & Plan  Patient presents with one week of lateral left hip wound intermittently draining pus   CT showing cutaneous defect and subcutaneous fat infiltration extending to the greater trochanter, no collection or abscess visualized   Local wound care, wound care consult     GERD (gastroesophageal reflux disease)  Assessment & Plan  Continue daily H2 blocker     Constipation  Assessment & Plan  History of chronic constipation  Continue daily Linzess with PRN bisacodyl suppository and Miralax     Parkinson disease ([facility] )  Assessment & Plan  History of parkinson disease with advanced dementia, nonverbal   Cared for at home by family   Continue Sinemet   Continue supportive care    Hypertension  Assessment & Plan  Elevated at time of presentation, 214/95  Improved to 139/68 without intervention     Hyperlipidemia  Assessment & Plan  Continue daily statin        VTE Prophylaxis: Heparin  / sequential compression device   Code Status: full code  POLST: POLST form is not discussed and not completed at this time  Discussion with family: none    Anticipated Length of Stay:  Patient will be admitted on an Observation basis with an anticipated length of stay of  Less than 2 midnights  Justification for Hospital Stay: elevated troponin     Total Time for Visit, including Counseling / Coordination of Care: 30 minutes  Greater than 50% of this total time spent on direct patient counseling and coordination of care  Chief Complaint:   vomiting    History of Present Illness:    Lexi Hendricks is a 78 y o  male with past medical history of Parkinson's dementia, hypertension, hyperlipidemia, GERD chronic constipation, CAD who presents with vomiting  Patient nonverbal at baseline, HPI obtained through chart review  Patient was reportedly brought to ED by family for 2 episodes of vomiting today  Patient also noted to have wound on lateral right thigh which has been intermittently draining  Family reports patient is approximately at mental baseline  Denies recent fever or chills  Patient smiles and makes appropriate eye contact on exam     Review of Systems:    Review of Systems   Unable to perform ROS: Patient nonverbal       Past Medical and Surgical History:     Past Medical History:   Diagnosis Date    [facility]     Ambulatory dysfunction     CAD (coronary artery disease)     [facility]     Constipation     Dementia ([facility] )     Diabetes mellitus ([facility] )     GERD (gastroesophageal reflux disease)     Hyperlipidemia     Hypertension     NSTEM[facility]non-ST elevated [facility]) [facility]     Parkinson disease ([facility]     Renal disorder        Past Surgical History:   Procedure Laterality Date    BACK SURGERY         Meds/Allergies:    Prior to Admission medications    Medication Sig Start Date End Date Taking?  Authorizing Provider   bisacodyl (DULCOLAX) 10 mg suppository Insert 10 mg into the rectum daily as needed for constipation 8/30/19  Yes Historical Provider, MD   carbidopa-levodopa (SINEMET)  mg per tablet Take 1 tablet by mouth Three times a day 7/2/19 7/1/20 Yes Historical Provider, MD   linaCLOtide Misael Noé) 72 MCG CAPS Take 72 mcg by mouth daily 6/17/19  Yes Historical Provider, MD   lisinopril (ZESTRIL) 10 mg tablet Take 10 mg by mouth daily 8/29/19  Yes Historical Provider, MD   QUEtiapine (SEROquel) 25 mg tablet Take 25 mg by mouth 7/2/19  Yes Historical Provider, MD   ranitidine (ZANTAC) 150 mg tablet Take 150 mg by mouth daily 6/8/19  Yes Historical Provider, MD   rosuvastatin (CRESTOR) 20 MG tablet Take 20 mg by mouth daily 8/29/19 8/28/20 Yes Historical Provider, MD   traZODone (DESYREL) 50 mg tablet Take 100 mg by mouth daily at bedtime 9/9/19  Yes Historical Provider, MD     I have been unable to obtain / verify an up to date medication list despite all reasonable attempts  Verified with [facility] records per care everywhere  Allergies: Allergies   Allergen Reactions    Rice        Social History:     Marital Status: Single   Occupation:  Disabled  Patient Pre-hospital Living Situation:  Home w/ family  Patient Pre-hospital Level of Mobility:  Bed bound  Patient Pre-hospital Diet Restrictions:  Dysphagia 3, thin liquids  Substance Use History:   Social History     Substance and Sexual Activity   Alcohol Use Not Currently     Social History     Tobacco Use   Smoking Status Former Smoker     Social History     Substance and Sexual Activity   Drug Use Never       Family History:    History reviewed  No pertinent family history  Physical Exam:     Vitals:   Blood Pressure: 139/68 (10/21/19 0233)  Pulse: 60 (10/21/19 0233)  Temperature: (!) 97 4 °F (36 3 °C) (10/20/19 2344)  Respirations: 16 (10/21/19 0233)  SpO2: 100 % (10/21/19 0233)    Physical Exam   Constitutional: He appears well-nourished  HENT:   Head: Normocephalic and atraumatic     Mouth/Throat: Oropharynx is clear and moist    Eyes: Conjunctivae and EOM are normal  No scleral icterus  Neck: Neck supple  Cardiovascular: Normal rate, regular rhythm and normal heart sounds  No murmur heard  Pulmonary/Chest: Effort normal and breath sounds normal  No respiratory distress  He has no wheezes  Abdominal: Soft  Bowel sounds are normal  He exhibits no distension  There is no tenderness  There is no guarding  Musculoskeletal: He exhibits no edema or deformity  Neurological: He is alert  Nonverbal, smiles spontaneously, makes appropriate eye contact   Skin: Skin is warm and dry  Right hip wound, quarter sized, no drainage or bleeding   Psychiatric: He has a normal mood and affect  His behavior is normal  Cognition and memory are impaired  Vitals reviewed  Additional Data:     Lab Results: I have personally reviewed pertinent reports  Results from last 7 days   Lab Units 10/21/19  0016   WBC Thousand/uL 8 31   HEMOGLOBIN g/dL 11 2*   HEMATOCRIT % 34 5*   PLATELETS Thousands/uL 223   NEUTROS PCT % 74   LYMPHS PCT % 18   MONOS PCT % 5   EOS PCT % 2     Results from last 7 days   Lab Units 10/21/19  0016   SODIUM mmol/L 137   POTASSIUM mmol/L 4 8   CHLORIDE mmol/L 101   CO2 mmol/L 27   BUN mg/dL 19   CREATININE mg/dL 1 44*   ANION GAP mmol/L 9   CALCIUM mg/dL 9 0   ALBUMIN g/dL 4 0   TOTAL BILIRUBIN mg/dL 0 33   ALK PHOS U/L 95   ALT U/L 15   AST U/L 15   GLUCOSE RANDOM mg/dL 151*                 Results from last 7 days   Lab Units 10/21/19  0016   LACTIC ACID mmol/L 1 6       Imaging: I have personally reviewed pertinent reports  CT abdomen pelvis with contrast   Final Result by Alea Peralta MD (10/21 3994)      Cutaneous defect and subcutaneous fat infiltration extending to the greater trochanter overlying the greater trochanter  No collection/abscess is visualized         Large amount of stool seen throughout the colon         Workstation performed: [id number]             EKG, Pathology, and Other Studies Reviewed on Admission:   · EKG: NSR, no acute ST or T wave changes     Allscripts / Epic Records Reviewed: Yes     ** Please Note: This note has been constructed using a voice recognition system   **

## 2019-10-21 NOTE — CONSULTS
Consult Note - Wound   Lexa Lopesorel 78 y o  male MRN: [id number]  Unit/Bed#: E4 -01 Encounter: [id number]      History and Present Illness:  78year old male presented to the hospital with vomiting  Patient's history significant for parkinson's  He is essentially non-verbal       Assessment Findings:   Patient seen per physician consult  Requires assist x 2 to turn for assessment  Very stiff, lower extremity contracture  Patient is incontinent of bowel and bladder--unable to maintain condom catheter per nursing team (patient picks at IVs, catheter, etc )  Skin is dry and flaky  Bilateral buttocks, heels, and sacrum intact  1   Present on admission wound with 100% yellow slough to right trochanter/lateral thigh--per documentation, area began as abscess that patient frequently picks open  Wound appears chronic and dry  Periwound with hyperpigmentation, slightly indurated distal to wound  There is a small recently healed area distal to the wound  CT abdomen pelvis on 10/21/2019--Cutaneous defect and subcutaneous fat infiltration extending to the greater trochanter overlying the greater trochanter  No collection/abscess is visualized  See flowsheet for wound details  Plan:   1-Hydraguard lotion to bilateral sacrum, buttock and heels TID and PRN with incontinence care  2-Elevate heels to offload pressure  3-EHOB offloading cushion in chair when out of bed  4-Moisturize skin daily with skin nourishing cream   5-Turn/reposition q2h or when medically stable for pressure re-distribution on skin--use positioning wedges  6-Accumax alternating pump to bed mattress  7-Right lateral thigh--cleanse with soap and water, pat dry  Apply Silvasorb gel to wound bed  Cover with Adaptic and DSD  Secure with tape  Change dressing daily  8-Wound care team to follow  Plan of care reviewed with primary RN      Wound 10/21/19 Thigh Right;Lateral;Distal (Active)   Wound Image 10/21/2019  2:52 PM   Wound Description Clean;Dry; Intact 10/21/2019  2:52 PM   Peace-wound Assessment Clean;Dry; Intact 10/21/2019  2:52 PM   Wound Length (cm) 0 cm 10/21/2019  2:52 PM   Wound Width (cm) 0 cm 10/21/2019  2:52 PM   Wound Depth (cm) 0 10/21/2019  2:52 PM   Calculated Wound Area (cm^2) 0 cm^2 10/21/2019  2:52 PM   Calculated Wound Volume (cm^3) 0 cm^3 10/21/2019  2:52 PM   Drainage Amount None 10/21/2019  2:52 PM   Treatments Cleansed 10/21/2019  7:45 AM   Dressing Open to air 10/21/2019  2:52 PM   Patient Tolerance Tolerated well 10/21/2019  2:52 PM       Wound 10/21/19 Thigh Proximal;Right;Lateral (Active)   Wound Image   10/21/2019  2:51 PM   Wound Description Yellow;Slough 10/21/2019  2:51 PM   Peace-wound Assessment Induration; Hyperpigmented; Intact 10/21/2019  2:51 PM   Wound Length (cm) 1 5 cm 10/21/2019  2:51 PM   Wound Width (cm) 2 cm 10/21/2019  2:51 PM   Wound Depth (cm) 0 1 10/21/2019  2:51 PM   Calculated Wound Area (cm^2) 3 cm^2 10/21/2019  2:51 PM   Calculated Wound Volume (cm^3) 0 3 cm^3 10/21/2019  2:51 PM   Tunneling 0 cm 10/21/2019  2:51 PM   Undermining 0 10/21/2019  2:51 PM   Drainage Amount Scant 10/21/2019  2:51 PM   Drainage Description Serous 10/21/2019  2:51 PM   Non-staged Wound Description Not applicable 30/98/6381  2:08 PM   Treatments Cleansed 10/21/2019  2:51 PM   Dressing Silvasorb gel;Non adherent;Dry dressing 10/21/2019  2:51 PM   Dressing Changed Changed 10/21/2019  2:51 PM   Patient Tolerance Tolerated well 10/21/2019  2:51 PM   Dressing Status Clean;Dry; Intact 10/21/2019  2:51 PM     Nikloai GREENN, RN, [employer]

## 2019-10-21 NOTE — ASSESSMENT & PLAN NOTE
Elevated at time of presentation, 214/95  Improved to 139/68 without intervention   Continue daily lisinopril

## 2019-10-21 NOTE — ASSESSMENT & PLAN NOTE
Patient presents with one week of lateral left hip wound intermittently draining pus   CT showing cutaneous defect and subcutaneous fat infiltration extending to the greater trochanter, no collection or abscess visualized   Local wound care, wound care consult

## 2019-10-21 NOTE — PLAN OF CARE
Problem: Prexisting or High Potential for Compromised Skin Integrity  Goal: Skin integrity is maintained or improved  Description  INTERVENTIONS:  - Identify patients at risk for skin breakdown  - Assess and monitor skin integrity  - Assess and monitor nutrition and hydration status  - Monitor labs   - Assess for incontinence   - Turn and reposition patient  - Assist with mobility/ambulation  - Relieve pressure over bony prominences  - Avoid friction and shearing  - Provide appropriate hygiene as needed including keeping skin clean and dry  - Evaluate need for skin moisturizer/barrier cream  - Collaborate with interdisciplinary team   - Patient/family teaching  - Consider wound care consult   Outcome: Progressing     Problem: Potential for Falls  Goal: Patient will remain free of falls  Description  INTERVENTIONS:  - Assess patient frequently for physical needs  -  Identify cognitive and physical deficits and behaviors that affect risk of falls    -  [place] fall precautions as indicated by assessment   - Educate patient/family on patient safety including physical limitations  - Instruct patient to call for assistance with activity based on assessment  - Modify environment to reduce risk of injury  - Consider OT/PT consult to assist with strengthening/mobility  Outcome: Progressing     Problem: DISCHARGE PLANNING  Goal: Discharge to home or other facility with appropriate resources  Description  INTERVENTIONS:  - Identify barriers to discharge w/patient and caregiver  - Arrange for needed discharge resources and transportation as appropriate  - Identify discharge learning needs (meds, wound care, etc )  - Arrange for interpretive services to assist at discharge as needed  - Refer to Case Management Department for coordinating discharge planning if the patient needs post-hospital services based on physician/advanced practitioner order or complex needs related to functional status, cognitive ability, or social support system  Outcome: Progressing     Problem: Knowledge Deficit  Goal: Patient/family/caregiver demonstrates understanding of disease process, treatment plan, medications, and discharge instructions  Description  Complete learning assessment and assess knowledge base    Interventions:  - Provide teaching at level of understanding  - Provide teaching via preferred learning methods  Outcome: Progressing

## 2019-10-21 NOTE — DISCHARGE INSTR - DIET
Dysphagia 3/dental soft, thin liquids  Slow rate, small bites, sips in bewtween bites  Stop feeding if "full"  Elevate head of bed at rest to ~ 30 degrees  Crush allowable meds

## 2019-10-21 NOTE — ASSESSMENT & PLAN NOTE
History of parkinson disease with advanced dementia, nonverbal   Cared for at home by family   Continue Sinemet   Continue supportive care

## 2019-10-21 NOTE — NURSING NOTE
Patient' son Yolahoney Harper at bedside  I reviewed meds with him along with the [address] [facility]  #[phone] via telephone  Luda garrett also spoke with the patient's son via the  line   Dr Sheldon Damon aware of updated med list

## 2019-10-21 NOTE — ASSESSMENT & PLAN NOTE
Troponin elevated to 0 13 at time of presentation   BP also elevated to 214/95, consider demand ischemia   Does have MI history in 2013 and 2016, no stents placed, medically managed   Continue ASA, statin and beta blocker  Trend troponins x 3   EKG without acute ST or T wave changes, repeat with third troponin   Cardiology consult

## 2019-10-21 NOTE — UTILIZATION REVIEW
Initial Clinical Review    Admission: Date/Time/Statement:   10/21 @ 0234 to Petty Wolff This Encounter   Procedures    Place in Observation     Standing Status:   Standing     Number of Occurrences:   1     Order Specific Question:   Admitting Physician     Answer:   Ousmane Thompson [[id number]]     Order Specific Question:   Level of Care     Answer:   Med Surg [16]     ED Arrival Information     Expected Arrival Acuity Means of Arrival Escorted By Service Admission Type    - 10/20/2019 23:36 Urgent Wheelchair Family Member General Medicine Urgent    Arrival Complaint    vomiting        Chief Complaint   Patient presents with    Vomiting     Per pt family pt started vomiting today and had 2x episodes  Family deny any other complaints  Pt nonverbal per family  Assessment/Plan: 79 yo male with pmh Alzheimer's disease, CAD, CHF, constipation, DM, GERD, HLD, HTN, Parkinson's and CKD presents to ED from home w/ family  Family states last week they noticed a wound to the lateral aspect of his right leg, which has been intermittently draining pus and today pt had  2   episodes of nonbloody emesis  Hypertensive on arrival; Right hip wound, quarter sized, no drainage or bleeding  Trop slightly elevated  Admitted to OBS with Elevated trop, Vomiting and  Open wound Left hip  Plan: R/O ACS, Consult Cardio, IVF's, prn Zofran, Continue ASA, statin and beta blocker  Consult Wound Care      Per Cardio: suspect troponin elevated secondary to poorly controlled hypertension    ED Triage Vitals   Temperature Pulse Respirations Blood Pressure SpO2   10/20/19 2344 10/20/19 2344 10/20/19 2344 10/20/19 2344 10/20/19 2344   (!) 97 4 °F (36 3 °C) 63 18 (!) 214/95 100 %      Temp Source Heart Rate Source Patient Position - Orthostatic VS BP Location FiO2 (%)   10/21/19 0403 10/20/19 2344 10/21/19 0233 10/20/19 2344 --   Temporal Monitor Lying Right arm       Pain Score       --               Wt Readings from Last 1 Encounters:   10/21/19 69 1 kg (152 lb 5 4 oz)     Additional Vital Signs:   10/21/19 1100  98 2 °F (36 8 °C)  60  18  174/58Abnormal   96 %  None (Room air)  Lying   10/21/19 0833  98 8 °F (37 1 °C)  65  18  165/90  96 %  None (Room air)  Sitting   10/21/19 0745  --  --  --  --  --  None (Room air)  --   10/21/19 0403  99 1 °F (37 3 °C)  65  16  154/86  100 %  None (Room air)  Lying       Pertinent Labs/Diagnostic Test Results:   Results from last 7 days   Lab Units 10/21/19  0016   WBC Thousand/uL 8 31   HEMOGLOBIN g/dL 11 2*   HEMATOCRIT % 34 5*   PLATELETS Thousands/uL 223   NEUTROS ABS Thousands/µL 6 09     Results from last 7 days   Lab Units 10/21/19  0016   SODIUM mmol/L 137   POTASSIUM mmol/L 4 8   CHLORIDE mmol/L 101   CO2 mmol/L 27   ANION GAP mmol/L 9   BUN mg/dL 19   CREATININE mg/dL 1 44*   EGFR ml/min/1 73sq m 49   CALCIUM mg/dL 9 0     Results from last 7 days   Lab Units 10/21/19  0016   AST U/L 15   ALT U/L 15   ALK PHOS U/L 95   TOTAL PROTEIN g/dL 7 5   ALBUMIN g/dL 4 0   TOTAL BILIRUBIN mg/dL 0 33     Results from last 7 days   Lab Units 10/21/19  0016   GLUCOSE RANDOM mg/dL 151*     Results from last 7 days   Lab Units 10/21/19  0634 10/21/19  0351 10/21/19  0016   TROPONIN I ng/mL 0 11* 0 12* 0 13*     Results from last 7 days   Lab Units 10/21/19  0016   LACTIC ACID mmol/L 1 6     Results from last 7 days   Lab Units 10/21/19  0016   LIPASE u/L 84     10/21: CT A&P: Cutaneous defect and subcutaneous fat infiltration extending to the greater trochanter overlying the greater trochanter   No collection/abscess is visualized     Large amount of stool seen throughout the colon     10/21: EKG: NSR, no acute ST or T wave changes     ED Treatment:   Medication Administration from 10/20/2019 2336 to 10/21/2019 9914       Date/Time Order Dose Route Action Action by Comments     10/21/2019 0121 sodium chloride 0 9 % bolus 1,000 mL 1,000 mL Intravenous New Bag Janine Hashimoto, RN         Past Medical History:   Diagnosis Date    Alzheimer disease ([place] [address] )     Ambulatory dysfunction     CAD (coronary artery disease)     [facility]     Constipation     Dementia ([facility]     Diabetes mellitus ([place] [address] )     GERD (gastroesophageal reflux disease)     Hyperlipidemia     Hypertension     NSTEMI (non-ST elevated myocardial infarction) ([place] [address] )     Parkinson disease ([place] [address] )     Renal disorder      Present on Admission:  **None**      Admitting Diagnosis: Vomiting [R11 10]  Elevated troponin [R79 89]  Hypertension, uncontrolled [I10]  Open wound of right hip [S71 001A]  Open wound of left hip [S71 002A]     Age/Sex: 78 y o  male  Admission Orders:  Medications:  aspirin 81 mg Oral Daily   atorvastatin 40 mg Oral Daily With Dinner   carbidopa-levodopa 1 tablet Oral TID   carvedilol 3 125 mg Oral BID With Meals   famotidine 20 mg Oral Daily   heparin (porcine) 5,000 Units Subcutaneous Q8H [address]   lisinopril 10 mg Oral Daily   lubiprostone 8 mcg Oral BID With Meals   QUEtiapine 25 mg Oral HS   senna 1 tablet Oral HS   traZODone 100 mg Oral HS       acetaminophen 650 mg Oral Q4H PRN   bisacodyl 10 mg Rectal Daily PRN   calcium carbonate 1,000 mg Oral Daily PRN   ondansetron 4 mg Intravenous Q6H PRN   polyethylene glycol 17 g Oral Daily PRN     TELE  SCD's  IP CONSULT TO WOUND CARE  IP CONSULT TO CARDIOLOGY    Network Utilization Review Department  Phone: [phone]; Fax [phone]  [email or web address]  ATTENTION: Please call with any questions or concerns to [phone]  and carefully listen to the prompts so that you are directed to the right person  Send all requests for admission clinical reviews, approved or denied determinations and any other requests to fax [phone]   All voicemails are confidential

## 2019-10-21 NOTE — CONSULTS
Consult - Cardiology   Shannon Shaun Cristian 78 y o  male MRN: [id number]  Unit/Bed#: E4 -01 Encounter: [id number]        Reason For Consult: Elevated troponin                ASSESSMENT:  1  Elevated troponin 0 13 --> 0 12 --> 0 11   -EKG reviewed, normal sinus rhythm without ischemic changes   -tele unremarkable    2  Poorly controlled HTN   -poorly controlled trending 160/80 (214/95 on arrival)    3  Parkinson's dementia    4  Chronic kidney disease   -creatinine currently at baseline, 1 4    5  Probable CAD    -no actual ischemic testing available   -NSTEMI 2016 at which time medical management recommended    PLAN:     1  Poor candidate for cardiac intervention given advanced dementia, prior NSTEMI 2016 with troponin as high as 5 7  Seen by cardiology at this time [facility]), medical mgmt  2  No ischemic testing necessary at this time, suspect troponin elevated secondary to poorly controlled hypertension  3  For his CAD he should be on a daily aspirin, will initiate  4  For HTN begin Coreg 3 125 b i d , follow tele (has in 60's since arrival), up titrate as able   5  Continue lisinopril, creatinine at baseline  6  Continue statin    History Of Present Illness: This is a 78-year-old male with a cardiac past medical history significant for NSTEMI in 2016 in the setting of bacteremia (peak troponin 5 7), hypertension, and probable dyslipidemia  He does not regularly follow with Cardiology from I can tell  He is nonverbal at baseline and has advanced dementia  During his hospitalization in 2016 [facility]) his primary issue was a UTI and bacteremia  He was seen by Cardiology at this time who recommended medical management in light of his advanced dementia  His noncardiac past medical history significant for Parkinson's disease  As discussed before he along with this he has advanced dementia and is nonverbal at baseline  Entirety of history is taken from patient's chart    Family apparently brought him to the emergency department yesterday secondary to several episodes of vomiting at home  They also note that has a wound developing on his right leg which has been draining pus and they felt needed to be addressed by healthcare providers  He is at his baseline mental status  He is essentially bed bound  He is cared for by his family at home  Past Medical History:        Past Medical History:   Diagnosis Date    Alzheimer disease ([place] [address] )     Ambulatory dysfunction     CAD (coronary artery disease)     [facility]     Constipation     Dementia ([facility])     Diabetes mellitus ([place] [address] )     GERD (gastroesophageal reflux disease)     Hyperlipidemia     Hypertension     NSTEMI (non-ST elevated myocardial infarction) ([place] [address] )     Parkinson disease ([place] [address] )     Renal disorder       Past Surgical History:   Procedure Laterality Date    BACK SURGERY          Allergy:        Allergies   Allergen Reactions    Rice        Medications:       Prior to Admission medications    Medication Sig Start Date End Date Taking?  Authorizing Provider   bisacodyl (DULCOLAX) 10 mg suppository Insert 10 mg into the rectum daily as needed for constipation 8/30/19  Yes Historical Provider, MD   carbidopa-levodopa (SINEMET)  mg per tablet Take 1 tablet by mouth Three times a day 7/2/19 7/1/20 Yes Historical Provider, MD   linaCLOtide Tatum Banter) 72 MCG CAPS Take 72 mcg by mouth daily 6/17/19  Yes Historical Provider, MD   lisinopril (ZESTRIL) 10 mg tablet Take 10 mg by mouth daily 8/29/19  Yes Historical Provider, MD   QUEtiapine (SEROquel) 25 mg tablet Take 25 mg by mouth 7/2/19  Yes Historical Provider, MD   ranitidine (ZANTAC) 150 mg tablet Take 150 mg by mouth daily 6/8/19  Yes Historical Provider, MD   rosuvastatin (CRESTOR) 20 MG tablet Take 20 mg by mouth daily 8/29/19 8/28/20 Yes Historical Provider, MD   traZODone (DESYREL) 50 mg tablet Take 100 mg by mouth daily at bedtime 9/9/19  Yes Historical Provider, MD       Family History:     History reviewed  No pertinent family history  Social History:       Social History     Socioeconomic History    Marital status: Single     Spouse name: None    Number of children: None    Years of education: None    Highest education level: None   Occupational History    None   Social Needs    Financial resource strain: None    Food insecurity:     Worry: None     Inability: None    Transportation needs:     Medical: None     Non-medical: None   Tobacco Use    Smoking status: Former Smoker   Substance and Sexual Activity    Alcohol use: Not Currently    Drug use: Never    Sexual activity: None   Lifestyle    Physical activity:     Days per week: None     Minutes per session: None    Stress: None   Relationships    Social connections:     Talks on phone: None     Gets together: None     Attends [employer] service: None     Active member of club or organization: None     Attends meetings of clubs or organizations: None     Relationship status: None    Intimate partner violence:     Fear of current or ex partner: None     Emotionally abused: None     Physically abused: None     Forced sexual activity: None   Other Topics Concern    None   Social History Narrative    None       ROS:  Unable to perform ROS secondary to nonverbal status  Remainder review of systems is negative    Exam:  General:  Main in bed, comfortable appearing  Makes good eye contact  Nonverbal  Head: Normocephalic, atraumatic  Eyes:  EOMI  Pupils - equal, round, reactive to accomodation  No icterus  Normal Conjunctiva     Oropharynx: moist and normal-appearing mucosa  Neck: supple, symmetrical, trachea midline and no JVD  Heart:  RRR, No: murmer, rub or gallop, S1 & S2 normal   Respiratory effort / Chest Inspection: unlabored  Lungs:  Lungs are clear anteriorly   Abdomen: flat, normal findings: bowel sounds normal and soft, non-tender  Lower Limbs:  no pitting edema  Pulses[de-identified]  RLE - DP: present 2+                 LLE - DP: present 2+  Musculoskeletal:  Right upper extremity dressed  Right lower extremity wound dressed      DATA:      ECG:   Normal sinus rhythm                    Telemetry: Normal sinus rhythm,   HR 60's                 Weights: Wt Readings from Last 3 Encounters:   10/21/19 69 1 kg (152 lb 5 4 oz)   07/19/17 81 6 kg (180 lb)   , There is no height or weight on file to calculate BMI           Lab Studies:    Results from last 7 days   Lab Units 10/21/19  0634 10/21/19  0351 10/21/19  0016   TROPONIN I ng/mL 0 11* 0 12* 0 13*          Results from last 7 days   Lab Units 10/21/19  0016   WBC Thousand/uL 8 31   HEMOGLOBIN g/dL 11 2*   HEMATOCRIT % 34 5*   PLATELETS Thousands/uL 223   ,   Results from last 7 days   Lab Units 10/21/19  0016   POTASSIUM mmol/L 4 8   CHLORIDE mmol/L 101   CO2 mmol/L 27   BUN mg/dL 19   CREATININE mg/dL 1 44*   CALCIUM mg/dL 9 0   ALK PHOS U/L 95   ALT U/L 15   AST U/L 15

## 2019-10-22 VITALS
WEIGHT: 152 LBS | DIASTOLIC BLOOD PRESSURE: 66 MMHG | RESPIRATION RATE: 20 BRPM | HEART RATE: 70 BPM | TEMPERATURE: 98.2 F | OXYGEN SATURATION: 94 % | BODY MASS INDEX: 23.86 KG/M2 | SYSTOLIC BLOOD PRESSURE: 156 MMHG | HEIGHT: 67 IN

## 2019-10-22 PROCEDURE — 99217 PR OBSERVATION CARE DISCHARGE MANAGEMENT: CPT | Performed by: STUDENT IN AN ORGANIZED HEALTH CARE EDUCATION/TRAINING PROGRAM

## 2019-10-22 RX ADMIN — CARVEDILOL 3.12 MG: 6.25 TABLET, FILM COATED ORAL at 10:53

## 2019-10-22 RX ADMIN — FAMOTIDINE 20 MG: 20 TABLET ORAL at 10:53

## 2019-10-22 RX ADMIN — LUBIPROSTONE 8 MCG: 8 CAPSULE, GELATIN COATED ORAL at 10:53

## 2019-10-22 RX ADMIN — CARBIDOPA AND LEVODOPA 1 TABLET: 25; 100 TABLET ORAL at 10:53

## 2019-10-22 RX ADMIN — LISINOPRIL 10 MG: 10 TABLET ORAL at 10:53

## 2019-10-22 RX ADMIN — ASPIRIN 81 MG 81 MG: 81 TABLET ORAL at 10:53

## 2019-10-22 RX ADMIN — HEPARIN SODIUM 5000 UNITS: 5000 INJECTION INTRAVENOUS; SUBCUTANEOUS at 05:10

## 2019-10-22 NOTE — ASSESSMENT & PLAN NOTE
Troponin elevation likely secondary to demand ischemia due to hypertensive urgency  Troponin stabilized  Cardiology consulted who suspected that this is not due to ischemia  Continue current medications on discharge  No need to continue his carvedilol due to heart rate on the low 60s and had better blood pressure control during this admission

## 2019-10-22 NOTE — PLAN OF CARE
Problem: Prexisting or High Potential for Compromised Skin Integrity  Goal: Skin integrity is maintained or improved  Description  INTERVENTIONS:  - Identify patients at risk for skin breakdown  - Assess and monitor skin integrity  - Assess and monitor nutrition and hydration status  - Monitor labs   - Assess for incontinence   - Turn and reposition patient  - Assist with mobility/ambulation  - Relieve pressure over bony prominences  - Avoid friction and shearing  - Provide appropriate hygiene as needed including keeping skin clean and dry  - Evaluate need for skin moisturizer/barrier cream  - Collaborate with interdisciplinary team   - Patient/family teaching  - Consider wound care consult   10/22/2019 1353 by Michelle Lopez RN  Outcome: Adequate for Discharge  10/22/2019 0733 by Michelle Lopez RN  Outcome: Progressing

## 2019-10-22 NOTE — NURSING NOTE
Reviewed and discussed pt's discharge instructions with use of , [facility]  Pt is discharged home  Discussed does time and medications with son and side effects  Pt is no oriented , pt's son is primary care taker

## 2019-10-22 NOTE — PLAN OF CARE
Problem: Prexisting or High Potential for Compromised Skin Integrity  Goal: Skin integrity is maintained or improved  Description  INTERVENTIONS:  - Identify patients at risk for skin breakdown  - Assess and monitor skin integrity  - Assess and monitor nutrition and hydration status  - Monitor labs   - Assess for incontinence   - Turn and reposition patient  - Assist with mobility/ambulation  - Relieve pressure over bony prominences  - Avoid friction and shearing  - Provide appropriate hygiene as needed including keeping skin clean and dry  - Evaluate need for skin moisturizer/barrier cream  - Collaborate with interdisciplinary team   - Patient/family teaching  - Consider wound care consult   Outcome: Progressing     Problem: Potential for Falls  Goal: Patient will remain free of falls  Description  INTERVENTIONS:  - Assess patient frequently for physical needs  -  Identify cognitive and physical deficits and behaviors that affect risk of falls    -  [place] fall precautions as indicated by assessment   - Educate patient/family on patient safety including physical limitations  - Instruct patient to call for assistance with activity based on assessment  - Modify environment to reduce risk of injury  - Consider OT/PT consult to assist with strengthening/mobility  Outcome: Progressing     Problem: DISCHARGE PLANNING  Goal: Discharge to home or other facility with appropriate resources  Description  INTERVENTIONS:  - Identify barriers to discharge w/patient and caregiver  - Arrange for needed discharge resources and transportation as appropriate  - Identify discharge learning needs (meds, wound care, etc )  - Arrange for interpretive services to assist at discharge as needed  - Refer to Case Management Department for coordinating discharge planning if the patient needs post-hospital services based on physician/advanced practitioner order or complex needs related to functional status, cognitive ability, or social support system  Outcome: Progressing     Problem: Knowledge Deficit  Goal: Patient/family/caregiver demonstrates understanding of disease process, treatment plan, medications, and discharge instructions  Description  Complete learning assessment and assess knowledge base  Interventions:  - Provide teaching at level of understanding  - Provide teaching via preferred learning methods  Outcome: Progressing     Problem: Nutrition/Hydration-ADULT  Goal: Nutrient/Hydration intake appropriate for improving, restoring or maintaining nutritional needs  Description  Monitor and assess patient's nutrition/hydration status for malnutrition  Collaborate with interdisciplinary team and initiate plan and interventions as ordered  Monitor patient's weight and dietary intake as ordered or per policy  Utilize nutrition screening tool and intervene as necessary  Determine patient's food preferences and provide high-protein, high-caloric foods as appropriate       INTERVENTIONS:  - Monitor oral intake, urinary output, labs, and treatment plans  - Assess nutrition and hydration status and recommend course of action  - Evaluate amount of meals eaten  - Assist patient with eating if necessary   - Allow adequate time for meals  - Recommend/ encourage appropriate diets, oral nutritional supplements, and vitamin/mineral supplements  - Order, calculate, and assess calorie counts as needed  - Recommend, monitor, and adjust tube feedings and TPN/PPN based on assessed needs  - Assess need for intravenous fluids  - Provide specific nutrition/hydration education as appropriate  - Include patient/family/caregiver in decisions related to nutrition  Outcome: Progressing

## 2019-10-22 NOTE — UTILIZATION REVIEW
Continued Stay Review    Date: 10/22/2019                   Current Patient Class:  OBS  Current Level of Care: [facility]    HPI:69 y o  male initially admitted on  10/21 @ 0234 to OBSERVATION    Assessment/Plan: Written for Discharge to home  10/22    Pertinent Labs/Diagnostic Results:   Results from last 7 days   Lab Units 10/21/19  0016   WBC Thousand/uL 8 31   HEMOGLOBIN g/dL 11 2*   HEMATOCRIT % 34 5*   PLATELETS Thousands/uL 223   NEUTROS ABS Thousands/µL 6 09     Results from last 7 days   Lab Units 10/21/19  0016   SODIUM mmol/L 137   POTASSIUM mmol/L 4 8   CHLORIDE mmol/L 101   CO2 mmol/L 27   ANION GAP mmol/L 9   BUN mg/dL 19   CREATININE mg/dL 1 44*   EGFR ml/min/1 73sq m 49   CALCIUM mg/dL 9 0     Results from last 7 days   Lab Units 10/21/19  0016   AST U/L 15   ALT U/L 15   ALK PHOS U/L 95   TOTAL PROTEIN g/dL 7 5   ALBUMIN g/dL 4 0   TOTAL BILIRUBIN mg/dL 0 33     Results from last 7 days   Lab Units 10/21/19  0016   GLUCOSE RANDOM mg/dL 151*     Results from last 7 days   Lab Units 10/21/19  0634 10/21/19  0351 10/21/19  0016   TROPONIN I ng/mL 0 11* 0 12* 0 13*     Results from last 7 days   Lab Units 10/21/19  0016   LACTIC ACID mmol/L 1 6     Results from last 7 days   Lab Units 10/21/19  0016   LIPASE u/L 84     Vital Signs:   10/22/19 0701  97 8 °F (36 6 °C)  62 20 162/72  93 %  -- Lying   10/21/19 2333  98 3 °F (36 8 °C)  70 20 128/86  93 %  None (Room air) Lying   10/21/19 2040  98 9 °F (37 2 °C)  74 18 178/86  94 %  None (Room air) Lying       Medications:   Medications:  aspirin 81 mg Oral Daily   atorvastatin 40 mg Oral Daily With Dinner   carbidopa-levodopa 1 tablet Oral TID   carvedilol 3 125 mg Oral BID With Meals   famotidine 20 mg Oral Daily   heparin (porcine) 5,000 Units Subcutaneous Q8H [facility]   lisinopril 10 mg Oral Daily   lubiprostone 8 mcg Oral BID With Meals   QUEtiapine 25 mg Oral HS   senna 1 tablet Oral HS   traZODone 100 mg Oral HS       acetaminophen 650 mg Oral Q4H PRN bisacodyl 10 mg Rectal Daily PRN   calcium carbonate 1,000 mg Oral Daily PRN   ondansetron 4 mg Intravenous Q6H PRN   polyethylene glycol 17 g Oral Daily PRN       Discharge Plan: Home    Network Utilization Review Department  Phone: [phone]; Fax [phone]  [email or web address]  ATTENTION: Please call with any questions or concerns to [phone]  and carefully listen to the prompts so that you are directed to the right person  Send all requests for admission clinical reviews, approved or denied determinations and any other requests to fax [phone]   All voicemails are confidential

## 2019-10-22 NOTE — ASSESSMENT & PLAN NOTE
History of parkinson disease with advanced dementia, nonverbal   - Cared for at home by family   - Continue Sinemet   - Continue supportive care

## 2019-10-22 NOTE — ASSESSMENT & PLAN NOTE
CT showing no collection or abscess  Wound care evaluated patient and stated that wound appears "Chronic and dry " They recommended hydration with moisturizer and frequent positioning changes

## 2019-10-22 NOTE — ASSESSMENT & PLAN NOTE
Resolved  Likely associatd with chronic dysphagia  Spoke to speech and swallow  They recommended pureed food with thin liquids and slow administration of meals with cues  Explained to son the importance of doing so via [employer] phone

## 2019-10-23 LAB
ATRIAL RATE: 59 BPM
ATRIAL RATE: 63 BPM
ATRIAL RATE: 64 BPM
P AXIS: 64 DEGREES
P AXIS: 70 DEGREES
P AXIS: 73 DEGREES
PR INTERVAL: 162 MS
PR INTERVAL: 172 MS
PR INTERVAL: 180 MS
QRS AXIS: 15 DEGREES
QRS AXIS: 26 DEGREES
QRS AXIS: 32 DEGREES
QRSD INTERVAL: 82 MS
QRSD INTERVAL: 90 MS
QRSD INTERVAL: 92 MS
QT INTERVAL: 446 MS
QT INTERVAL: 454 MS
QT INTERVAL: 456 MS
QTC INTERVAL: 451 MS
QTC INTERVAL: 456 MS
QTC INTERVAL: 468 MS
T WAVE AXIS: 64 DEGREES
T WAVE AXIS: 72 DEGREES
T WAVE AXIS: 77 DEGREES
VENTRICULAR RATE: 59 BPM
VENTRICULAR RATE: 63 BPM
VENTRICULAR RATE: 64 BPM

## 2019-10-23 PROCEDURE — 93010 ELECTROCARDIOGRAM REPORT: CPT | Performed by: INTERNAL MEDICINE

## 2020-10-15 ENCOUNTER — APPOINTMENT (EMERGENCY)
Dept: RADIOLOGY | Facility: HOSPITAL | Age: 70
DRG: 871 | End: 2020-10-15
Payer: COMMERCIAL

## 2020-10-15 ENCOUNTER — HOSPITAL ENCOUNTER (INPATIENT)
Facility: HOSPITAL | Age: 70
LOS: 12 days | Discharge: HOME HEALTH | DRG: 871 | End: 2020-10-27
Attending: EMERGENCY MEDICINE | Admitting: INTERNAL MEDICINE
Payer: COMMERCIAL

## 2020-10-15 ENCOUNTER — APPOINTMENT (EMERGENCY)
Dept: CT IMAGING | Facility: HOSPITAL | Age: 70
DRG: 871 | End: 2020-10-15
Payer: COMMERCIAL

## 2020-10-15 DIAGNOSIS — N17.9 AKI (ACUTE KIDNEY INJURY) (HCC): Primary | ICD-10-CM

## 2020-10-15 DIAGNOSIS — R74.01 TRANSAMINITIS: ICD-10-CM

## 2020-10-15 DIAGNOSIS — B95.62 MRSA BACTEREMIA: ICD-10-CM

## 2020-10-15 DIAGNOSIS — A41.9 SEPSIS (HCC): ICD-10-CM

## 2020-10-15 DIAGNOSIS — N39.0 UTI (URINARY TRACT INFECTION): ICD-10-CM

## 2020-10-15 DIAGNOSIS — G20 PARKINSON DISEASE (HCC): ICD-10-CM

## 2020-10-15 DIAGNOSIS — K59.00 CONSTIPATION: ICD-10-CM

## 2020-10-15 DIAGNOSIS — R62.7 FAILURE TO THRIVE IN ADULT: ICD-10-CM

## 2020-10-15 DIAGNOSIS — E78.5 HYPERLIPIDEMIA, UNSPECIFIED HYPERLIPIDEMIA TYPE: ICD-10-CM

## 2020-10-15 DIAGNOSIS — I10 ESSENTIAL HYPERTENSION: ICD-10-CM

## 2020-10-15 DIAGNOSIS — R78.81 MRSA BACTEREMIA: ICD-10-CM

## 2020-10-15 LAB
ALBUMIN SERPL BCP-MCNC: 2.8 G/DL (ref 3.5–5)
ALP SERPL-CCNC: 117 U/L (ref 46–116)
ALT SERPL W P-5'-P-CCNC: 133 U/L (ref 12–78)
ANION GAP SERPL CALCULATED.3IONS-SCNC: 14 MMOL/L (ref 4–13)
APTT PPP: 33 SECONDS (ref 23–37)
AST SERPL W P-5'-P-CCNC: 130 U/L (ref 5–45)
BASE EX.OXY STD BLDV CALC-SCNC: 67.6 % (ref 60–80)
BASE EXCESS BLDV CALC-SCNC: -3.8 MMOL/L
BASOPHILS # BLD AUTO: 0.06 THOUSANDS/ÂΜL (ref 0–0.1)
BASOPHILS NFR BLD AUTO: 0 % (ref 0–1)
BILIRUB SERPL-MCNC: 0.24 MG/DL (ref 0.2–1)
BILIRUB UR QL STRIP: NEGATIVE
BUN SERPL-MCNC: 70 MG/DL (ref 5–25)
CALCIUM ALBUM COR SERPL-MCNC: 10.8 MG/DL (ref 8.3–10.1)
CALCIUM SERPL-MCNC: 9.8 MG/DL (ref 8.3–10.1)
CHLORIDE SERPL-SCNC: 110 MMOL/L (ref 100–108)
CLARITY UR: ABNORMAL
CO2 SERPL-SCNC: 23 MMOL/L (ref 21–32)
COLOR UR: YELLOW
CREAT SERPL-MCNC: 2.86 MG/DL (ref 0.6–1.3)
EOSINOPHIL # BLD AUTO: 0.03 THOUSAND/ÂΜL (ref 0–0.61)
EOSINOPHIL NFR BLD AUTO: 0 % (ref 0–6)
ERYTHROCYTE [DISTWIDTH] IN BLOOD BY AUTOMATED COUNT: 12 % (ref 11.6–15.1)
GFR SERPL CREATININE-BSD FRML MDRD: 21 ML/MIN/1.73SQ M
GLUCOSE SERPL-MCNC: 284 MG/DL (ref 65–140)
GLUCOSE UR STRIP-MCNC: NEGATIVE MG/DL
HCO3 BLDV-SCNC: 20.5 MMOL/L (ref 24–30)
HCT VFR BLD AUTO: 31.8 % (ref 36.5–49.3)
HGB BLD-MCNC: 9.4 G/DL (ref 12–17)
HGB UR QL STRIP.AUTO: ABNORMAL
IMM GRANULOCYTES # BLD AUTO: 0.12 THOUSAND/UL (ref 0–0.2)
IMM GRANULOCYTES NFR BLD AUTO: 0 % (ref 0–2)
INR PPP: 1.22 (ref 0.84–1.19)
KETONES UR STRIP-MCNC: ABNORMAL MG/DL
LACTATE SERPL-SCNC: 3.3 MMOL/L (ref 0.5–2)
LACTATE SERPL-SCNC: 3.4 MMOL/L (ref 0.5–2)
LEUKOCYTE ESTERASE UR QL STRIP: ABNORMAL
LYMPHOCYTES # BLD AUTO: 1.85 THOUSANDS/ÂΜL (ref 0.6–4.47)
LYMPHOCYTES NFR BLD AUTO: 11 % (ref 14–44)
MCH RBC QN AUTO: 28.9 PG (ref 26.8–34.3)
MCHC RBC AUTO-ENTMCNC: 29.6 G/DL (ref 31.4–37.4)
MCV RBC AUTO: 98 FL (ref 82–98)
MONOCYTES # BLD AUTO: 0.8 THOUSAND/ÂΜL (ref 0.17–1.22)
MONOCYTES NFR BLD AUTO: 5 % (ref 4–12)
NEUTROPHILS # BLD AUTO: 14.61 THOUSANDS/ÂΜL (ref 1.85–7.62)
NEUTS SEG NFR BLD AUTO: 84 % (ref 43–75)
NITRITE UR QL STRIP: NEGATIVE
NRBC BLD AUTO-RTO: 0 /100 WBCS
NT-PROBNP SERPL-MCNC: 1693 PG/ML
O2 CT BLDV-SCNC: 9.8 ML/DL
PCO2 BLDV: 34.7 MM HG (ref 42–50)
PH BLDV: 7.39 [PH] (ref 7.3–7.4)
PH UR STRIP.AUTO: 5.5 [PH]
PLATELET # BLD AUTO: 331 THOUSANDS/UL (ref 149–390)
PMV BLD AUTO: 11.1 FL (ref 8.9–12.7)
PO2 BLDV: 37.6 MM HG (ref 35–45)
POTASSIUM SERPL-SCNC: 5.1 MMOL/L (ref 3.5–5.3)
PROT SERPL-MCNC: 9.1 G/DL (ref 6.4–8.2)
PROT UR STRIP-MCNC: ABNORMAL MG/DL
PROTHROMBIN TIME: 15.2 SECONDS (ref 11.6–14.5)
RBC # BLD AUTO: 3.25 MILLION/UL (ref 3.88–5.62)
SARS-COV-2 RNA RESP QL NAA+PROBE: NEGATIVE
SODIUM SERPL-SCNC: 147 MMOL/L (ref 136–145)
SP GR UR STRIP.AUTO: 1.02 (ref 1–1.03)
TROPONIN I SERPL-MCNC: <0.02 NG/ML
UROBILINOGEN UR QL STRIP.AUTO: 1 E.U./DL
WBC # BLD AUTO: 17.47 THOUSAND/UL (ref 4.31–10.16)

## 2020-10-15 PROCEDURE — 81001 URINALYSIS AUTO W/SCOPE: CPT | Performed by: EMERGENCY MEDICINE

## 2020-10-15 PROCEDURE — 87635 SARS-COV-2 COVID-19 AMP PRB: CPT | Performed by: EMERGENCY MEDICINE

## 2020-10-15 PROCEDURE — 82550 ASSAY OF CK (CPK): CPT | Performed by: INTERNAL MEDICINE

## 2020-10-15 PROCEDURE — 84484 ASSAY OF TROPONIN QUANT: CPT | Performed by: EMERGENCY MEDICINE

## 2020-10-15 PROCEDURE — 87147 CULTURE TYPE IMMUNOLOGIC: CPT | Performed by: EMERGENCY MEDICINE

## 2020-10-15 PROCEDURE — 99223 1ST HOSP IP/OBS HIGH 75: CPT | Performed by: INTERNAL MEDICINE

## 2020-10-15 PROCEDURE — 74176 CT ABD & PELVIS W/O CONTRAST: CPT

## 2020-10-15 PROCEDURE — 84443 ASSAY THYROID STIM HORMONE: CPT | Performed by: INTERNAL MEDICINE

## 2020-10-15 PROCEDURE — 96365 THER/PROPH/DIAG IV INF INIT: CPT

## 2020-10-15 PROCEDURE — 83690 ASSAY OF LIPASE: CPT | Performed by: INTERNAL MEDICINE

## 2020-10-15 PROCEDURE — 87077 CULTURE AEROBIC IDENTIFY: CPT | Performed by: EMERGENCY MEDICINE

## 2020-10-15 PROCEDURE — 87077 CULTURE AEROBIC IDENTIFY: CPT | Performed by: INTERNAL MEDICINE

## 2020-10-15 PROCEDURE — 82805 BLOOD GASES W/O2 SATURATION: CPT | Performed by: EMERGENCY MEDICINE

## 2020-10-15 PROCEDURE — 93005 ELECTROCARDIOGRAM TRACING: CPT

## 2020-10-15 PROCEDURE — 71045 X-RAY EXAM CHEST 1 VIEW: CPT

## 2020-10-15 PROCEDURE — 83880 ASSAY OF NATRIURETIC PEPTIDE: CPT | Performed by: EMERGENCY MEDICINE

## 2020-10-15 PROCEDURE — 80053 COMPREHEN METABOLIC PANEL: CPT | Performed by: EMERGENCY MEDICINE

## 2020-10-15 PROCEDURE — 36415 COLL VENOUS BLD VENIPUNCTURE: CPT | Performed by: EMERGENCY MEDICINE

## 2020-10-15 PROCEDURE — 99285 EMERGENCY DEPT VISIT HI MDM: CPT | Performed by: EMERGENCY MEDICINE

## 2020-10-15 PROCEDURE — 87086 URINE CULTURE/COLONY COUNT: CPT | Performed by: INTERNAL MEDICINE

## 2020-10-15 PROCEDURE — G1004 CDSM NDSC: HCPCS

## 2020-10-15 PROCEDURE — 87186 SC STD MICRODIL/AGAR DIL: CPT | Performed by: EMERGENCY MEDICINE

## 2020-10-15 PROCEDURE — 99285 EMERGENCY DEPT VISIT HI MDM: CPT

## 2020-10-15 PROCEDURE — 85025 COMPLETE CBC W/AUTO DIFF WBC: CPT | Performed by: EMERGENCY MEDICINE

## 2020-10-15 PROCEDURE — 83605 ASSAY OF LACTIC ACID: CPT | Performed by: EMERGENCY MEDICINE

## 2020-10-15 PROCEDURE — 84145 PROCALCITONIN (PCT): CPT | Performed by: EMERGENCY MEDICINE

## 2020-10-15 PROCEDURE — 96367 TX/PROPH/DG ADDL SEQ IV INF: CPT

## 2020-10-15 PROCEDURE — 85730 THROMBOPLASTIN TIME PARTIAL: CPT | Performed by: EMERGENCY MEDICINE

## 2020-10-15 PROCEDURE — 87186 SC STD MICRODIL/AGAR DIL: CPT | Performed by: INTERNAL MEDICINE

## 2020-10-15 PROCEDURE — 85610 PROTHROMBIN TIME: CPT | Performed by: EMERGENCY MEDICINE

## 2020-10-15 PROCEDURE — 87040 BLOOD CULTURE FOR BACTERIA: CPT | Performed by: EMERGENCY MEDICINE

## 2020-10-15 RX ORDER — OMEPRAZOLE 20 MG/1
20 CAPSULE, DELAYED RELEASE ORAL DAILY
COMMUNITY

## 2020-10-15 RX ORDER — ACETAMINOPHEN 650 MG/1
650 SUPPOSITORY RECTAL ONCE
Status: COMPLETED | OUTPATIENT
Start: 2020-10-15 | End: 2020-10-15

## 2020-10-15 RX ADMIN — ACETAMINOPHEN 650 MG: 650 SUPPOSITORY RECTAL at 21:45

## 2020-10-15 RX ADMIN — METRONIDAZOLE 500 MG: 500 INJECTION, SOLUTION INTRAVENOUS at 23:04

## 2020-10-15 RX ADMIN — CEFTRIAXONE SODIUM 2000 MG: 10 INJECTION, POWDER, FOR SOLUTION INTRAVENOUS at 21:57

## 2020-10-15 RX ADMIN — SODIUM CHLORIDE 500 ML: 0.9 INJECTION, SOLUTION INTRAVENOUS at 21:45

## 2020-10-16 PROBLEM — R74.01 TRANSAMINITIS: Status: ACTIVE | Noted: 2020-10-16

## 2020-10-16 PROBLEM — N17.9 AKI (ACUTE KIDNEY INJURY) (HCC): Status: ACTIVE | Noted: 2020-10-16

## 2020-10-16 PROBLEM — A41.9 SEPSIS DUE TO PNEUMONIA (HCC): Status: ACTIVE | Noted: 2020-10-16

## 2020-10-16 PROBLEM — E11.9 DIABETES MELLITUS (HCC): Status: ACTIVE | Noted: 2020-10-16

## 2020-10-16 PROBLEM — J18.9 SEPSIS DUE TO PNEUMONIA (HCC): Status: ACTIVE | Noted: 2020-10-16

## 2020-10-16 LAB
ALBUMIN SERPL BCP-MCNC: 2.5 G/DL (ref 3.5–5)
ALP SERPL-CCNC: 106 U/L (ref 46–116)
ALT SERPL W P-5'-P-CCNC: 150 U/L (ref 12–78)
ANION GAP SERPL CALCULATED.3IONS-SCNC: 13 MMOL/L (ref 4–13)
AST SERPL W P-5'-P-CCNC: 89 U/L (ref 5–45)
ATRIAL RATE: 106 BPM
BACTERIA UR QL AUTO: ABNORMAL /HPF
BILIRUB SERPL-MCNC: 0.2 MG/DL (ref 0.2–1)
BUN SERPL-MCNC: 68 MG/DL (ref 5–25)
CALCIUM ALBUM COR SERPL-MCNC: 10.4 MG/DL (ref 8.3–10.1)
CALCIUM SERPL-MCNC: 9.2 MG/DL (ref 8.3–10.1)
CHLORIDE SERPL-SCNC: 114 MMOL/L (ref 100–108)
CK SERPL-CCNC: 116 U/L (ref 39–308)
CO2 SERPL-SCNC: 19 MMOL/L (ref 21–32)
CREAT SERPL-MCNC: 2.4 MG/DL (ref 0.6–1.3)
ERYTHROCYTE [DISTWIDTH] IN BLOOD BY AUTOMATED COUNT: 12 % (ref 11.6–15.1)
GFR SERPL CREATININE-BSD FRML MDRD: 26 ML/MIN/1.73SQ M
GLUCOSE SERPL-MCNC: 168 MG/DL (ref 65–140)
GLUCOSE SERPL-MCNC: 218 MG/DL (ref 65–140)
GLUCOSE SERPL-MCNC: 227 MG/DL (ref 65–140)
GLUCOSE SERPL-MCNC: 244 MG/DL (ref 65–140)
GLUCOSE SERPL-MCNC: 253 MG/DL (ref 65–140)
HBV CORE AB SER QL: REACTIVE
HBV CORE IGM SER QL: ABNORMAL
HBV SURFACE AG SER QL: ABNORMAL
HCT VFR BLD AUTO: 31 % (ref 36.5–49.3)
HCV AB SER QL: ABNORMAL
HGB BLD-MCNC: 9 G/DL (ref 12–17)
L PNEUMO1 AG UR QL IA.RAPID: NEGATIVE
LACTATE SERPL-SCNC: 1.4 MMOL/L (ref 0.5–2)
LIPASE SERPL-CCNC: 134 U/L (ref 73–393)
MCH RBC QN AUTO: 28.9 PG (ref 26.8–34.3)
MCHC RBC AUTO-ENTMCNC: 29 G/DL (ref 31.4–37.4)
MCV RBC AUTO: 100 FL (ref 82–98)
NON-SQ EPI CELLS URNS QL MICRO: ABNORMAL /HPF
P AXIS: 79 DEGREES
PLATELET # BLD AUTO: 283 THOUSANDS/UL (ref 149–390)
PMV BLD AUTO: 11.1 FL (ref 8.9–12.7)
POTASSIUM SERPL-SCNC: 5 MMOL/L (ref 3.5–5.3)
PR INTERVAL: 132 MS
PROCALCITONIN SERPL-MCNC: 0.43 NG/ML
PROCALCITONIN SERPL-MCNC: 0.53 NG/ML
PROT SERPL-MCNC: 8.4 G/DL (ref 6.4–8.2)
QRS AXIS: 46 DEGREES
QRSD INTERVAL: 78 MS
QT INTERVAL: 336 MS
QTC INTERVAL: 446 MS
RBC # BLD AUTO: 3.11 MILLION/UL (ref 3.88–5.62)
RBC #/AREA URNS AUTO: ABNORMAL /HPF
S PNEUM AG UR QL: NEGATIVE
SODIUM SERPL-SCNC: 146 MMOL/L (ref 136–145)
T WAVE AXIS: 79 DEGREES
TSH SERPL DL<=0.05 MIU/L-ACNC: 0.6 UIU/ML (ref 0.36–3.74)
VENTRICULAR RATE: 106 BPM
WBC # BLD AUTO: 17.23 THOUSAND/UL (ref 4.31–10.16)
WBC #/AREA URNS AUTO: ABNORMAL /HPF

## 2020-10-16 PROCEDURE — 86803 HEPATITIS C AB TEST: CPT | Performed by: INTERNAL MEDICINE

## 2020-10-16 PROCEDURE — 87340 HEPATITIS B SURFACE AG IA: CPT | Performed by: INTERNAL MEDICINE

## 2020-10-16 PROCEDURE — 99223 1ST HOSP IP/OBS HIGH 75: CPT | Performed by: INTERNAL MEDICINE

## 2020-10-16 PROCEDURE — 84145 PROCALCITONIN (PCT): CPT | Performed by: INTERNAL MEDICINE

## 2020-10-16 PROCEDURE — 99232 SBSQ HOSP IP/OBS MODERATE 35: CPT | Performed by: INTERNAL MEDICINE

## 2020-10-16 PROCEDURE — 97167 OT EVAL HIGH COMPLEX 60 MIN: CPT

## 2020-10-16 PROCEDURE — 87449 NOS EACH ORGANISM AG IA: CPT | Performed by: INTERNAL MEDICINE

## 2020-10-16 PROCEDURE — 93010 ELECTROCARDIOGRAM REPORT: CPT

## 2020-10-16 PROCEDURE — 86705 HEP B CORE ANTIBODY IGM: CPT | Performed by: INTERNAL MEDICINE

## 2020-10-16 PROCEDURE — 83605 ASSAY OF LACTIC ACID: CPT | Performed by: INTERNAL MEDICINE

## 2020-10-16 PROCEDURE — 82948 REAGENT STRIP/BLOOD GLUCOSE: CPT

## 2020-10-16 PROCEDURE — 85027 COMPLETE CBC AUTOMATED: CPT | Performed by: INTERNAL MEDICINE

## 2020-10-16 PROCEDURE — 80053 COMPREHEN METABOLIC PANEL: CPT | Performed by: INTERNAL MEDICINE

## 2020-10-16 PROCEDURE — 92610 EVALUATE SWALLOWING FUNCTION: CPT

## 2020-10-16 PROCEDURE — 97163 PT EVAL HIGH COMPLEX 45 MIN: CPT

## 2020-10-16 PROCEDURE — 86704 HEP B CORE ANTIBODY TOTAL: CPT | Performed by: INTERNAL MEDICINE

## 2020-10-16 RX ORDER — ACETAMINOPHEN 325 MG/1
650 TABLET ORAL EVERY 6 HOURS PRN
Status: DISCONTINUED | OUTPATIENT
Start: 2020-10-16 | End: 2020-10-27 | Stop reason: HOSPADM

## 2020-10-16 RX ORDER — LACTULOSE 20 G/30ML
20 SOLUTION ORAL 3 TIMES DAILY
Status: DISCONTINUED | OUTPATIENT
Start: 2020-10-16 | End: 2020-10-18

## 2020-10-16 RX ORDER — LEVALBUTEROL 1.25 MG/.5ML
1.25 SOLUTION, CONCENTRATE RESPIRATORY (INHALATION) EVERY 6 HOURS PRN
Status: DISCONTINUED | OUTPATIENT
Start: 2020-10-16 | End: 2020-10-27 | Stop reason: HOSPADM

## 2020-10-16 RX ORDER — HEPARIN SODIUM 5000 [USP'U]/ML
5000 INJECTION, SOLUTION INTRAVENOUS; SUBCUTANEOUS EVERY 8 HOURS SCHEDULED
Status: DISCONTINUED | OUTPATIENT
Start: 2020-10-16 | End: 2020-10-27 | Stop reason: HOSPADM

## 2020-10-16 RX ORDER — AZITHROMYCIN 250 MG/1
500 TABLET, FILM COATED ORAL EVERY 24 HOURS
Status: DISCONTINUED | OUTPATIENT
Start: 2020-10-16 | End: 2020-10-16

## 2020-10-16 RX ORDER — ATORVASTATIN CALCIUM 40 MG/1
40 TABLET, FILM COATED ORAL
Status: DISCONTINUED | OUTPATIENT
Start: 2020-10-16 | End: 2020-10-27 | Stop reason: HOSPADM

## 2020-10-16 RX ORDER — POLYETHYLENE GLYCOL 3350 17 G/17G
17 POWDER, FOR SOLUTION ORAL 2 TIMES DAILY
Status: DISCONTINUED | OUTPATIENT
Start: 2020-10-16 | End: 2020-10-19

## 2020-10-16 RX ORDER — SODIUM CHLORIDE 450 MG/100ML
84 INJECTION, SOLUTION INTRAVENOUS CONTINUOUS
Status: DISCONTINUED | OUTPATIENT
Start: 2020-10-16 | End: 2020-10-16

## 2020-10-16 RX ORDER — ONDANSETRON 2 MG/ML
4 INJECTION INTRAMUSCULAR; INTRAVENOUS EVERY 6 HOURS PRN
Status: DISCONTINUED | OUTPATIENT
Start: 2020-10-16 | End: 2020-10-27 | Stop reason: HOSPADM

## 2020-10-16 RX ORDER — PANTOPRAZOLE SODIUM 40 MG/1
40 TABLET, DELAYED RELEASE ORAL
Status: DISCONTINUED | OUTPATIENT
Start: 2020-10-16 | End: 2020-10-21

## 2020-10-16 RX ADMIN — POLYETHYLENE GLYCOL 3350 17 G: 17 POWDER, FOR SOLUTION ORAL at 09:11

## 2020-10-16 RX ADMIN — SODIUM BICARBONATE: 84 INJECTION, SOLUTION INTRAVENOUS at 11:36

## 2020-10-16 RX ADMIN — INSULIN LISPRO 2 UNITS: 100 INJECTION, SOLUTION INTRAVENOUS; SUBCUTANEOUS at 17:19

## 2020-10-16 RX ADMIN — CARBIDOPA AND LEVODOPA 1 TABLET: 25; 100 TABLET ORAL at 09:11

## 2020-10-16 RX ADMIN — CARBIDOPA AND LEVODOPA 1 TABLET: 25; 100 TABLET ORAL at 21:42

## 2020-10-16 RX ADMIN — SODIUM CHLORIDE 84 ML/HR: 0.45 INJECTION, SOLUTION INTRAVENOUS at 03:15

## 2020-10-16 RX ADMIN — METRONIDAZOLE 500 MG: 500 INJECTION, SOLUTION INTRAVENOUS at 23:54

## 2020-10-16 RX ADMIN — INSULIN LISPRO 3 UNITS: 100 INJECTION, SOLUTION INTRAVENOUS; SUBCUTANEOUS at 11:40

## 2020-10-16 RX ADMIN — METRONIDAZOLE 500 MG: 500 INJECTION, SOLUTION INTRAVENOUS at 14:30

## 2020-10-16 RX ADMIN — SODIUM BICARBONATE: 84 INJECTION, SOLUTION INTRAVENOUS at 23:54

## 2020-10-16 RX ADMIN — LACTULOSE 20 G: 10 SOLUTION ORAL at 21:42

## 2020-10-16 RX ADMIN — CEFTRIAXONE 1000 MG: 1 INJECTION, POWDER, FOR SOLUTION INTRAMUSCULAR; INTRAVENOUS at 21:43

## 2020-10-16 RX ADMIN — HEPARIN SODIUM 5000 UNITS: 5000 INJECTION INTRAVENOUS; SUBCUTANEOUS at 03:15

## 2020-10-16 RX ADMIN — TRAZODONE HYDROCHLORIDE 150 MG: 100 TABLET ORAL at 21:42

## 2020-10-16 RX ADMIN — ATORVASTATIN CALCIUM 40 MG: 40 TABLET, FILM COATED ORAL at 17:20

## 2020-10-16 RX ADMIN — LACTULOSE 20 G: 10 SOLUTION ORAL at 17:23

## 2020-10-16 RX ADMIN — HEPARIN SODIUM 5000 UNITS: 5000 INJECTION INTRAVENOUS; SUBCUTANEOUS at 21:43

## 2020-10-16 RX ADMIN — POLYETHYLENE GLYCOL 3350 17 G: 17 POWDER, FOR SOLUTION ORAL at 21:43

## 2020-10-16 RX ADMIN — CARBIDOPA AND LEVODOPA 1 TABLET: 25; 100 TABLET ORAL at 17:20

## 2020-10-16 RX ADMIN — LACTULOSE 20 G: 10 SOLUTION ORAL at 09:11

## 2020-10-16 RX ADMIN — HEPARIN SODIUM 5000 UNITS: 5000 INJECTION INTRAVENOUS; SUBCUTANEOUS at 14:30

## 2020-10-16 RX ADMIN — INSULIN LISPRO 1 UNITS: 100 INJECTION, SOLUTION INTRAVENOUS; SUBCUTANEOUS at 21:43

## 2020-10-16 RX ADMIN — AZITHROMYCIN MONOHYDRATE 500 MG: 500 INJECTION, POWDER, LYOPHILIZED, FOR SOLUTION INTRAVENOUS at 05:37

## 2020-10-16 RX ADMIN — INSULIN LISPRO 3 UNITS: 100 INJECTION, SOLUTION INTRAVENOUS; SUBCUTANEOUS at 07:39

## 2020-10-16 RX ADMIN — METRONIDAZOLE 500 MG: 500 INJECTION, SOLUTION INTRAVENOUS at 07:39

## 2020-10-16 NOTE — ASSESSMENT & PLAN NOTE
· Transaminitis unclear etiology.   Recheck in a.m. with hepatitis panel  · Check CK    Results from last 7 days   Lab Units 10/15/20  2100   AST U/L 130*   ALT U/L 133*   TOTAL BILIRUBIN mg/dL 0.24

## 2020-10-16 NOTE — ASSESSMENT & PLAN NOTE
· Parkinson's dementia not much verbal at baseline and essentially bed/wheelchair bound  · Continue sinemet 25/100 t.i.d.

## 2020-10-16 NOTE — UTILIZATION REVIEW
Notification of Inpatient Admission/Inpatient Authorization Request   This is a Notification of Inpatient Admission for Yoko Nuno. Be advised that this patient was admitted to our facility under Inpatient Status. Contact Zeinab Roth at [phone] for additional admission information. [facility] UR DEPT. DEDICATED [phone]. Patient Name:   Terrell Foster   YOB: 1950       [address]:   [address] [place] [address]  Tax ID: [id number]  NPI: [id number] Attending Provider/NPI:  Phone:  Address: Maria Esther Alcocer [[id number]]  [phone]  Same as the facility   Place of Service Code: 24 Place of Service Name:  [address] [place] [address]   Start Date: 10/15/20 2325 Discharge Date & Time: No discharge date for patient encounter. Type of Admission: Inpatient Status Discharge Disposition   (if discharged): Home/Self Care   Patient Diagnoses: Transaminitis [R74.01]  Constipation [K59.00]  Oxygen decrease [R09.02]  Failure to thrive in adult [R62.7]  DREW (acute kidney injury) ([address]) [N17.9]  Sepsis ([address]) [A41.9]     Orders: Admission Orders (From admission, onward)     Ordered        10/15/20 2325  Inpatient Admission  Once                    Assigned Utilization Review Contact: Eduardo Dumont  Utilization   Network Utilization Review Department  Phone: [phone]; Fax [phone]  Email: [email or web address]   ATTENTION PAYERS: Please call the assigned Utilization  directly with any questions or concerns ALL voicemails in the department are confidential. Send all requests for admission clinical reviews, approved or denied determinations and any other requests to dedicated fax number belonging to the campus where the patient is receiving treatment.

## 2020-10-16 NOTE — PLAN OF CARE
Problem: Potential for Falls  Goal: Patient will remain free of falls  Description: INTERVENTIONS:  - Assess patient frequently for physical needs  -  Identify cognitive and physical deficits and behaviors that affect risk of falls.   -  [place] fall precautions as indicated by assessment.  - Educate patient/family on patient safety including physical limitations  - Instruct patient to call for assistance with activity based on assessment  - Modify environment to reduce risk of injury  - Consider OT/PT consult to assist with strengthening/mobility  Outcome: Progressing     Problem: Prexisting or High Potential for Compromised Skin Integrity  Goal: Skin integrity is maintained or improved  Description: INTERVENTIONS:  - Identify patients at risk for skin breakdown  - Assess and monitor skin integrity  - Assess and monitor nutrition and hydration status  - Monitor labs   - Assess for incontinence   - Turn and reposition patient  - Assist with mobility/ambulation  - Relieve pressure over bony prominences  - Avoid friction and shearing  - Provide appropriate hygiene as needed including keeping skin clean and dry  - Evaluate need for skin moisturizer/barrier cream  - Collaborate with interdisciplinary team   - Patient/family teaching  - Consider wound care consult   Outcome: Progressing     Problem: DISCHARGE PLANNING - CARE MANAGEMENT  Goal: Discharge to post-acute care or home with appropriate resources  Description: INTERVENTIONS:  - Conduct assessment to determine patient/family and health care team treatment goals, and need for post-acute services based on payer coverage, community resources, and patient preferences, and barriers to discharge  - Address psychosocial, clinical, and financial barriers to discharge as identified in assessment in conjunction with the patient/family and health care team  - Arrange appropriate level of post-acute services according to patient’s   needs and preference and payer coverage in collaboration with the physician and health care team  - Communicate with and update the patient/family, physician, and health care team regarding progress on the discharge plan  - Arrange appropriate transportation to post-acute venues  Outcome: Progressing     Problem: INFECTION - ADULT  Goal: Absence or prevention of progression during hospitalization  Description: INTERVENTIONS:  - Assess and monitor for signs and symptoms of infection  - Monitor lab/diagnostic results  - Monitor all insertion sites, i.e. indwelling lines, tubes, and drains  - Monitor endotracheal if appropriate and nasal secretions for changes in amount and color  - [place] appropriate cooling/warming therapies per order  - Administer medications as ordered  - Instruct and encourage patient and family to use good hand hygiene technique  - Identify and instruct in appropriate isolation precautions for identified infection/condition  Outcome: Progressing  Goal: Absence of fever/infection during neutropenic period  Description: INTERVENTIONS:  - Monitor WBC    Outcome: Progressing     Problem: SAFETY ADULT  Goal: Patient will remain free of falls  Description: INTERVENTIONS:  - Assess patient frequently for physical needs  -  Identify cognitive and physical deficits and behaviors that affect risk of falls.   -  [place] fall precautions as indicated by assessment.  - Educate patient/family on patient safety including physical limitations  - Instruct patient to call for assistance with activity based on assessment  - Modify environment to reduce risk of injury  - Consider OT/PT consult to assist with strengthening/mobility  Outcome: Progressing  Goal: Maintain or return to baseline ADL function  Description: INTERVENTIONS:  -  Assess patient's ability to carry out ADLs; assess patient's baseline for ADL function and identify physical deficits which impact ability to perform ADLs (bathing, care of mouth/teeth, toileting, grooming, dressing, etc.)  - Assess/evaluate cause of self-care deficits   - Assess range of motion  - Assess patient's mobility; develop plan if impaired  - Assess patient's need for assistive devices and provide as appropriate  - Encourage maximum independence but intervene and supervise when necessary  - Involve family in performance of ADLs  - Assess for home care needs following discharge   - Consider OT consult to assist with ADL evaluation and planning for discharge  - Provide patient education as appropriate  Outcome: Progressing  Goal: Maintain or return mobility status to optimal level  Description: INTERVENTIONS:  - Assess patient's baseline mobility status (ambulation, transfers, stairs, etc.)    - Identify cognitive and physical deficits and behaviors that affect mobility  - Identify mobility aids required to assist with transfers and/or ambulation (gait belt, sit-to-stand, lift, walker, cane, etc.)  - [place] fall precautions as indicated by assessment  - Record patient progress and toleration of activity level on Mobility SBAR; progress patient to next Phase/Stage  - Instruct patient to call for assistance with activity based on assessment  - Consider rehabilitation consult to assist with strengthening/weightbearing, etc.  Outcome: Progressing     Problem: DISCHARGE PLANNING  Goal: Discharge to home or other facility with appropriate resources  Description: INTERVENTIONS:  - Identify barriers to discharge w/patient and caregiver  - Arrange for needed discharge resources and transportation as appropriate  - Identify discharge learning needs (meds, wound care, etc.)  - Arrange for interpretive services to assist at discharge as needed  - Refer to Case Management Department for coordinating discharge planning if the patient needs post-hospital services based on physician/advanced practitioner order or complex needs related to functional status, cognitive ability, or social support system  Outcome: Progressing     Problem: Knowledge Deficit  Goal: Patient/family/caregiver demonstrates understanding of disease process, treatment plan, medications, and discharge instructions  Description: Complete learning assessment and assess knowledge base. Interventions:  - Provide teaching at level of understanding  - Provide teaching via preferred learning methods  Outcome: Progressing     Problem: Nutrition/Hydration-ADULT  Goal: Nutrient/Hydration intake appropriate for improving, restoring or maintaining nutritional needs  Description: Monitor and assess patient's nutrition/hydration status for malnutrition. Collaborate with interdisciplinary team and initiate plan and interventions as ordered. Monitor patient's weight and dietary intake as ordered or per policy. Utilize nutrition screening tool and intervene as necessary. Determine patient's food preferences and provide high-protein, high-caloric foods as appropriate.      INTERVENTIONS:  - Monitor oral intake, urinary output, labs, and treatment plans  - Assess nutrition and hydration status and recommend course of action  - Evaluate amount of meals eaten  - Assist patient with eating if necessary   - Allow adequate time for meals  - Recommend/ encourage appropriate diets, oral nutritional supplements, and vitamin/mineral supplements  - Order, calculate, and assess calorie counts as needed  - Recommend, monitor, and adjust tube feedings and TPN/PPN based on assessed needs  - Assess need for intravenous fluids  - Provide specific nutrition/hydration education as appropriate  - Include patient/family/caregiver in decisions related to nutrition  Outcome: Progressing

## 2020-10-16 NOTE — ED NOTES
Patient's brief contained BM. Brief was changed at this time.  No obvious redness or excoriation observed to the rectal/perineum/groin     Romario Alvarado, HASMUKH  10/15/20 9505

## 2020-10-16 NOTE — UTILIZATION REVIEW
Initial Clinical Review    Admission: Date/Time/Statement:   Admission Orders (From admission, onward)     Ordered        10/15/20 2325  Inpatient Admission  Once                   Orders Placed This Encounter   Procedures   • Inpatient Admission     Standing Status:   Standing     Number of Occurrences:   1     Order Specific Question:   Admitting Physician     Answer:   Magdalene Kehr [[id number]]     Order Specific Question:   Level of Care     Answer:   Med Surg [[id number]]     Order Specific Question:   Estimated length of stay     Answer:   More than 2 Midnights     Order Specific Question:   Certification     Answer:   I certify that inpatient services are medically necessary for this patient for a duration of greater than two midnights. See H&P and MD Progress Notes for additional information about the patient's course of treatment. ED Arrival Information     Expected Arrival Acuity Means of Arrival Escorted By Service Admission Type    - 10/15/2020 19:59 [facility]gent [facility] Emergency    Arrival Complaint    shortness of breath        Chief Complaint   Patient presents with   • Decreased Oxygen Level     Pts son states if pts oxygen gets low to come into hospital.  Pt was having low O2 sats at home. Pts son states he also has lost his appetite and is losing weight, also does not ambulate anymore (though he did not ambulate much to begin with). Assessment/Plan:    69  Y O male  Presents to ED   From  Home  after being found hypoxic  . Has  Had poor po intake for past several days. Son concerned and took patient to PCP office, ED recommended,  Son took patient  Home instead. Continued with increased work of breathing, became  Increasingly lethargic and  Did bring him to ED. PMH  Is   Parkinson's  Dementia, wheelchair bound at baseline,  HTN, GERD   And  DM. Labs  Reveal elevated creatinine, elevated lactic acid and elevated LFT's. CXR cannot  Rule out aspiration pneumonia.   Admit  Ip with   Sepsis  Due to pneumonia/UTI,    Transaminitis, DREW  And plan is  Monitor labs,  IVF, speech eval, blood/urine cultures and  MAJO>      10/16  Sepsis related to pneumonia with concern for aspiration. Continue   MAJO. Continue to monitor labs. Plan nephrology consult.       ED Triage Vitals   Temperature Pulse Respirations Blood Pressure SpO2   10/15/20 2007 10/15/20 2007 10/15/20 2007 10/15/20 2007 10/15/20 2007   99.3 °F (37.4 °C) (!) 111 20 135/61 98 %      Temp Source Heart Rate Source Patient Position - Orthostatic VS BP Location FiO2 (%)   10/15/20 2007 10/15/20 2007 10/15/20 2007 10/15/20 2007 --   Temporal Monitor Lying Right arm       Pain Score       10/15/20 2145       Med Not Given for Pain - for MAR use only          Wt Readings from Last 1 Encounters:   10/16/20 59.1 kg (130 lb 4.7 oz)     Additional Vital Signs:   10/16/20 0803   98 °F (36.7 °C)   72   18   145/74   --   98 %   --   --    10/16/20 0059   98.3 °F (36.8 °C)   78   20   164/49Abnormal     --   97 %   None (Room air)   Lying    10/16/20 0030   --   84   22   159/69   99   97 %   None (Room air)   Lying    10/15/20 2345   --   84   28Abnormal     161/70   100   98 %   None (Room air)   Lying    10/15/20 2330   --   82   19   149/66   95   97 %   None (Room air)   Lying    10/15/20 2300   --   90   20   149/65   94   98 %   None (Room air)   Lying    10/15/20 2215   --   86   20   156/69   99   100 %   None (Room air)   Lying    10/15/20 2145   --   94   22   139/63   90   99 %   None (Room air)   Lying    10/15/20 2144   100.4 °F (38 °C)   --   --   --   --   --   --   --    10/15/20 2111   --   97   21   136/61   --   99 %   None (Room air)   Lying    10/15/20 2049   --   --   --   --   --   --   None (Room air)   --    10/15/20 2007   99.3 °F (37.4 °C)   111Abnormal     20   135/61   --   98 %   None (Room air)   Lying         Pertinent Labs/Diagnostic Test Results:   Ct  Abd/pelvis   ( 10/15)     Findings consistent with rectal impaction and constipation.  No evidence of bowel obstruction, colitis or diverticulitis.    CXR  ( 10/16)   Possible  Right sided infiltrate  EKG    ST    No St changes  Results from last 7 days   Lab Units 10/15/20  2134   SARS-COV-2  Negative     Results from last 7 days   Lab Units 10/16/20  0502 10/15/20  2100   WBC Thousand/uL 17.23* 17.47*   HEMOGLOBIN g/dL 9.0* 9.4*   HEMATOCRIT % 31.0* 31.8*   PLATELETS Thousands/uL 283 331   NEUTROS ABS Thousands/µL  --  14.61*         Results from last 7 days   Lab Units 10/16/20  0502 10/15/20  2100   SODIUM mmol/L 146* 147*   POTASSIUM mmol/L 5.0 5.1   CHLORIDE mmol/L 114* 110*   CO2 mmol/L 19* 23   ANION GAP mmol/L 13 14*   BUN mg/dL 68* 70*   CREATININE mg/dL 2.40* 2.86*   EGFR ml/min/1.73sq m 26 21   CALCIUM mg/dL 9.2 9.8     Results from last 7 days   Lab Units 10/16/20  0502 10/15/20  2100   AST U/L 89* 130*   ALT U/L 150* 133*   ALK PHOS U/L 106 117*   TOTAL PROTEIN g/dL 8.4* 9.1*   ALBUMIN g/dL 2.5* 2.8*   TOTAL BILIRUBIN mg/dL 0.20 0.24     Results from last 7 days   Lab Units 10/16/20  0638   POC GLUCOSE mg/dl 244*     Results from last 7 days   Lab Units 10/16/20  0502 10/15/20  2100   GLUCOSE RANDOM mg/dL 227* 284*              Results from last 7 days   Lab Units 10/15/20  2110   PH MOUSTAPHA  7.390   PCO2 MOUSTAPHA mm Hg 34.7*   PO2 MOUSTAPHA mm Hg 37.6   HCO3 MOUSTAPHA mmol/L 20.5*   BASE EXC MOUSTAPHA mmol/L -3.8   O2 CONTENT MOUSTAPHA ml/dL 9.8   O2 HGB, VENOUS % 67.6         Results from last 7 days   Lab Units 10/15/20  2100   CK TOTAL U/L 116     Results from last 7 days   Lab Units 10/15/20  2100   TROPONIN I ng/mL <0.02         Results from last 7 days   Lab Units 10/15/20  2100   PROTIME seconds 15.2*   INR  1.22*   PTT seconds 33     Results from last 7 days   Lab Units 10/15/20  2100   TSH 3RD GENERATON uIU/mL 0.599     Results from last 7 days   Lab Units 10/16/20  0502 10/15/20  2100   PROCALCITONIN ng/ml 0.43* 0.53*     Results from last 7 days   Lab Units 10/16/20  0502 10/15/20  2300 10/15/20  2100   LACTIC ACID mmol/L 1.4 3.3* 3.4*             Results from last 7 days   Lab Units 10/15/20  2100   NT-PRO BNP pg/mL 1,693*             Results from last 7 days   Lab Units 10/15/20  2100   LIPASE u/L 134             Results from last 7 days   Lab Units 10/15/20  2302   CLARITY UA  Cloudy   COLOR UA  Yellow   SPEC GRAV UA  1.025   PH UA  5.5   GLUCOSE UA mg/dl Negative   KETONES UA mg/dl Trace*   BLOOD UA  Small*   PROTEIN UA mg/dl 100 (2+)*   NITRITE UA  Negative   BILIRUBIN UA  Negative   UROBILINOGEN UA E.U./dl 1.0   LEUKOCYTES UA  Large*   WBC UA /hpf Innumerable*   RBC UA /hpf 1-2*   BACTERIA UA /hpf Moderate*   EPITHELIAL CELLS WET PREP /hpf Occasional           Results from last 7 days   Lab Units 10/15/20  2105 10/15/20  2055   BLOOD CULTURE  Received in Microbiology Lab. Culture in Progress. Received in Microbiology Lab. Culture in Progress.                ED Treatment:   Medication Administration from 10/15/2020 1959 to 10/16/2020 0101       Date/Time Order Dose Route Action Comments     10/15/2020 2145 acetaminophen (TYLENOL) rectal suppository 650 mg 650 mg Rectal Given      10/15/2020 2247 sodium chloride 0.9 % bolus 500 mL 0 mL Intravenous Stopped      10/15/2020 2145 sodium chloride 0.9 % bolus 500 mL 500 mL Intravenous New Bag      10/15/2020 2302 ceftriaxone (ROCEPHIN) 2 g/50 mL in dextrose IVPB 0 mg Intravenous Stopped      10/15/2020 2157 ceftriaxone (ROCEPHIN) 2 g/50 mL in dextrose IVPB 2,000 mg Intravenous New Bag      10/16/2020 0042 metroNIDAZOLE (FLAGYL) IVPB (premix) 500 mg 100 mL 0 mg Intravenous Stopped      10/15/2020 2304 metroNIDAZOLE (FLAGYL) IVPB (premix) 500 mg 100 mL 500 mg Intravenous New Bag         Present on Admission:  • Parkinson disease ([address])  • Constipation  • GERD (gastroesophageal reflux disease)  • Diabetes mellitus ([address])  • Sepsis due to pneumonia ([address])  • DREW (acute kidney injury) ([address])  • Hypertension  • Hyperlipidemia  • Transaminitis      Admitting Diagnosis: Transaminitis [R74.01]  Constipation [K59.00]  Oxygen decrease [R09.02]  Failure to thrive in adult [R62.7]  DREW (acute kidney injury) ([address]) [N17.9]  Sepsis ([address]) [A41.9]  Age/Sex: 69 y.o. male  Admission Orders:  Scheduled Medications:  atorvastatin, 40 mg, Oral, Daily With Dinner  azithromycin, 500 mg, Intravenous, Q24H  carbidopa-levodopa, 1 tablet, Oral, TID  cefTRIAXone, 1,000 mg, Intravenous, Q24H  heparin (porcine), 5,000 Units, Subcutaneous, Q8H [address]  insulin lispro, 1-6 Units, Subcutaneous, 4x Daily (AC & HS)  lactulose, 20 g, Oral, TID  metroNIDAZOLE, 500 mg, Intravenous, Q8H  pantoprazole, 40 mg, Oral, Early Morning  polyethylene glycol, 17 g, Oral, BID  traZODone, 150 mg, Oral, HS      Continuous IV Infusions:     PRN Meds:  acetaminophen, 650 mg, Oral, Q6H PRN  levalbuterol, 1.25 mg, Nebulization, Q6H PRN  ondansetron, 4 mg, Intravenous, Q6H PRN        IP CONSULT TO CASE MANAGEMENT  IP CONSULT TO NEPHROLOGY    Network Utilization Review Department  [email or web address]  ATTENTION: Please call with any questions or concerns to [phone] and carefully listen to the prompts so that you are directed to the right person. All voicemails are confidential.  [employer] all requests for admission clinical reviews, approved or denied determinations and any other requests to dedicated fax number below belonging to the campus where the patient is receiving treatment.  List of dedicated fax numbers for the Facilities:  FACILITY NAME UR FAX NUMBER   ADMISSION DENIALS (Administrative/Medical Necessity) [phone] [facility] [phone]   [facility] [phone]   [id number] [place] [address] [place] [phone]   [address] [facility] [phone] [address] [facility] [address] [facility] [phone]

## 2020-10-16 NOTE — ED NOTES
Son returned from home and is at bedside. SLIM is at bedside speaking with son via [employer] interpretation.      Lacinda Dancer, RN  10/15/20 1671

## 2020-10-16 NOTE — PLAN OF CARE
Problem: PHYSICAL THERAPY ADULT  Goal: Performs mobility at highest level of function for planned discharge setting. See evaluation for individualized goals. Description: Treatment/Interventions: Functional transfer training, Therapeutic exercise, Endurance training, Patient/family training, Equipment eval/education, Bed mobility, Compensatory technique education, Continued evaluation, Spoke to nursing, OT  Equipment Recommended: (monitor)       See flowsheet documentation for full assessment, interventions and recommendations. Note: Prognosis: Guarded  Problem List: Decreased strength, Decreased range of motion, Decreased endurance, Decreased mobility, Impaired balance, Decreased coordination, Decreased cognition, Impaired judgement, Decreased safety awareness, Decreased skin integrity  Assessment: Deuce Foster is a 69 y.o. male who presents with worsening debility and hypoxia. Admitted with sepsis due to PNA, transaminitis, DREW. Pt with HTN, Parkinsons, GERD, hyperlipidemia. Per chart resides with son. Primarily bedbound vs WC bound at home. Nonambulatory. PT consulted. Currently presents with significant functional limitations related to cognition and ability to follow motor commands, minimally to nonverbal, increased rigidity and tone with B/L LE flexion contractures with preference for fetal position in supine,  increased UE tone. Is dependent of 2 for mobility and supine<>sit. Decreased sitting tolerance and balance. Not appropriate for OOB transfers at this time given strength and balance limitations with inability to follow motor commands. Would rec saira if OOB to appropriate seating surface at this time. Progress with therapy may be limited given cognition and dependent nature. Will require environment in which has 24* care. Can trial PT in order to optimize functional mobility while reducing caregiver burden.         PT Discharge Recommendation: Other (Comment)(requires 24* care. )          See flowsheet documentation for full assessment.

## 2020-10-16 NOTE — H&P
H&P- Gonzalez Lopeswade 1950, 69 y.o. male MRN: [id number]  Unit/Bed#: ED 09 Encounter: [id number]  Primary Care Provider: Trent Bishop MD   Date and time admitted to hospital: 10/15/2020  8:46 PM        Assessment and Plan  * Sepsis due to pneumonia [facility])  Assessment & Plan  · Sepsis secondary to pneumonia +/-UTI  · Cannot rule out aspiration pneumonia. Continue ceftriaxone/metronidazole but add azithromycin for atypical coverage  · Modified diet to mechanical soft with nectar until seen by speech pathologist  · Follow-up on blood and urine cultures  · Continue IVF for lactic acidosis    Results from last 7 days   Lab Units 10/15/20  2134   SARS-COV-2  Negative     Results from last 7 days   Lab Units 10/15/20  2300 10/15/20  2100   LACTIC ACID mmol/L 3.3* 3.4*       Transaminitis  Assessment & Plan  · Transaminitis unclear etiology. Recheck in a.m. with hepatitis panel  · Check CK    Results from last 7 days   Lab Units 10/15/20  2100   AST U/L 130*   ALT U/L 133*   TOTAL BILIRUBIN mg/dL 0.24       DREW (acute kidney injury) ([address])  Assessment & Plan  · DREW likely secondary to poor intake with lisinopril use  · Hold lisinopril. Given hyponatremia start 1/2 NSS and recheck labs in a.m.  · Hold metformin. Results from last 7 days   Lab Units 10/15/20  2100   BUN mg/dL 70*   CREATININE mg/dL 2.86*       Diabetes mellitus [facility])  Assessment & Plan  Lab Results   Component Value Date    HGBA1C 7.3 (H) 08/26/2020     Results from last 7 days   Lab Units 10/15/20  2100   GLUCOSE RANDOM mg/dL 284*     · Diabetes mellitus with hyperglycemia. Hold metformin given kidney injury. Of note it was reduced to 500 mg b.i.d. Today by PCP due to advancing kidney disease  · Add sliding scale insulin during hospitalization    GERD (gastroesophageal reflux disease)  Assessment & Plan  · GERD continue PPI    Constipation  Assessment & Plan  · Severe constipation with fecal impaction.   Will start lactulose and b.i.d. miralax    Parkinson disease ([address])  Assessment & Plan  · Parkinson's dementia not much verbal at baseline and essentially bed/wheelchair bound  · Continue sinemet 25/100 t.i.d. Hypertension  Assessment & Plan  · Essential hypertension will hold lisinopril given kidney injury    Hyperlipidemia  Assessment & Plan  · Hyperlipidemia. Rosuvastatin will be substituted with atorvastatin based on hospital formulary      VTE Prophylaxis: Heparin  Code Status:  Level one full code  Anticipated Length of Stay:  Patient will be admitted on an Inpatient basis with an anticipated length of stay of  greater than 2 midnights. Justification for Hospital Stay: Sepsis due to pneumonia [facility])  Total Time for Visit, including Counseling / Coordination of Care: xx mins. Greater than 50% of this total time spent on direct patient counseling and coordination of care. Chief Complaint:     Chief Complaint   Patient presents with   • Decreased Oxygen Level     Pts son states if pts oxygen gets low to come into hospital.  Pt was having low O2 sats at home. Pts son states he also has lost his appetite and is losing weight, also does not ambulate anymore (though he did not ambulate much to begin with). History of Present Illness:    Lana Rodney is a 69 y.o. male who presents with worsening debility and hypoxia. The patient has Parkinson's dementia and son is Singaporean-speaking at bedside.  ## [id number] used. For last several days patient has had poor intake. He has been agreeable to take medications but not solid foods. At baseline he is wheelchair/bedbound and not much verbal.  The son took the patient to PCP office today and due to concerns of kidney injury his metformin was reduced. He was noted to be hypoxic and recommendation was go to the emergency department but the patient was taken home instead.   He continued to have increased work of breathing so he was brought the hospital where he remained lethargic and was found have kidney injury. The son whom the patient resides with denies any sick contacts or recent travels    Review of Systems:  Review of Systems   Constitutional: Positive for appetite change, chills and fatigue. Negative for diaphoresis and fever. HENT: Negative for dental problem and facial swelling. Eyes: Negative for photophobia, pain and visual disturbance. Respiratory: Positive for shortness of breath. Negative for wheezing and stridor. Cardiovascular: Negative for chest pain and palpitations. Gastrointestinal: Negative for abdominal distention, abdominal pain, diarrhea, nausea and vomiting. Genitourinary: Negative for dysuria, hematuria and urgency. Musculoskeletal: Negative for back pain and myalgias. Skin: Negative for rash. Neurological: Positive for weakness. Negative for dizziness, seizures, speech difficulty, numbness and headaches. Psychiatric/Behavioral: Negative for agitation and suicidal ideas. The patient is not nervous/anxious. All other systems reviewed and are negative. Past Medical and Surgical History:   Past Medical History:   Diagnosis Date   • Alzheimer disease [facility])    • Ambulatory dysfunction    • CAD (coronary artery disease)    • CHF (congestive heart failure) ([address])    • Constipation    • Dementia (HCC)    • Diabetes mellitus ([address])    • GERD (gastroesophageal reflux disease)    • Hyperlipidemia    • Hypertension    • NSTEMI (non-ST elevated myocardial infarction) ([address])    • Parkinson disease ([address])      Past Surgical History:   Procedure Laterality Date   • BACK SURGERY       Meds/Allergies: Allergies: Allergies   Allergen Reactions   • Rice      Prior to Admission Medications   Prescriptions Last Dose Informant Patient Reported?  Taking?   bisacodyl (DULCOLAX) 10 mg suppository   Yes No   Sig: Insert 10 mg into the rectum daily as needed for constipation   carbidopa-levodopa (SINEMET)  mg per tablet   Yes Yes   Sig: Take 1 tablet by mouth Three times a day   lisinopril (ZESTRIL) 10 mg tablet   Yes No   Sig: Take 10 mg by mouth daily   metFORMIN (GLUCOPHAGE) 1000 MG tablet  Child Yes No   Sig: Take 500 mg by mouth 2 (two) times a day with meals    omeprazole (PriLOSEC) 20 mg delayed release capsule   Yes Yes   Sig: Take 20 mg by mouth daily   rosuvastatin (CRESTOR) 20 MG tablet   Yes No   Sig: Take 20 mg by mouth daily   traZODone (DESYREL) 50 mg tablet   Yes No   Sig: Take 150 mg by mouth daily at bedtime       Facility-Administered Medications: None     Social History:     Social History     Socioeconomic History   • Marital status: Single     Spouse name: Not on file   • Number of children: Not on file   • Years of education: Not on file   • Highest education level: Not on file   Occupational History   • Not on file   Social Needs   • Financial resource strain: Not on file   • Food insecurity     Worry: Not on file     Inability: Not on file   • Transportation needs     Medical: Not on file     Non-medical: Not on file   Tobacco Use   • Smoking status: Former Smoker   • Smokeless tobacco: Never Used   Substance and Sexual Activity   • Alcohol use: Not Currently   • Drug use: Never   • Sexual activity: Not on file   Lifestyle   • Physical activity     Days per week: Not on file     Minutes per session: Not on file   • Stress: Not on file   Relationships   • Social connections     Talks on phone: Not on file     Gets together: Not on file     Attends Catholic service: Not on file     Active member of club or organization: Not on file     Attends meetings of clubs or organizations: Not on file     Relationship status: Not on file   • Intimate partner violence     Fear of current or ex partner: Not on file     Emotionally abused: Not on file     Physically abused: Not on file     Forced sexual activity: Not on file   Other Topics Concern   • Not on file   Social History Narrative   • Not on file     Patient Pre-hospital Living Situation:  Lives with son  Patient Pre-hospital Level of Mobility:  Wheelchair/bedbound  Patient Pre-hospital Diet Restrictions:     Family History:  History reviewed. No pertinent family history. Physical Exam:   Vitals:   Blood Pressure: 149/66 (10/15/20 2330)  Pulse: 82 (10/15/20 2330)  Temperature: 100.4 °F (38 °C) (10/15/20 2144)  Temp Source: Rectal (10/15/20 2144)  Respirations: 19 (10/15/20 2330)  SpO2: 97 % (10/15/20 2330)    Physical Exam  Vitals signs reviewed. Constitutional:       General: He is not in acute distress. Appearance: He is not ill-appearing. HENT:      Head: Normocephalic and atraumatic. Mouth/Throat:      Mouth: Mucous membranes are dry. Eyes:      Extraocular Movements: Extraocular movements intact. Pupils: Pupils are equal, round, and reactive to light. Neck:      Musculoskeletal: Neck supple. Cardiovascular:      Rate and Rhythm: Normal rate and regular rhythm. Heart sounds: Normal heart sounds. Pulmonary:      Effort: Pulmonary effort is normal.      Breath sounds: No wheezing. Comments: diminished breath sounds bibasilar  Abdominal:      General: Bowel sounds are normal.      Palpations: Abdomen is soft. Tenderness: There is no abdominal tenderness. There is no rebound. Musculoskeletal:         General: No swelling or tenderness. Skin:     General: Skin is warm and dry. Neurological:      Mental Status: Mental status is at baseline. He is disoriented. Cranial Nerves: No cranial nerve deficit. Psychiatric:         Mood and Affect: Mood normal.       Lab Results: I have personally reviewed pertinent reports.     Results from last 7 days   Lab Units 10/15/20  2100   WBC Thousand/uL 17.47*   HEMOGLOBIN g/dL 9.4*   HEMATOCRIT % 31.8*   PLATELETS Thousands/uL 331   NEUTROS PCT % 84*   LYMPHS PCT % 11*   MONOS PCT % 5   EOS PCT % 0     Results from last 7 days   Lab Units 10/15/20  2100   SODIUM mmol/L 147*   POTASSIUM mmol/L 5.1 CHLORIDE mmol/L 110*   CO2 mmol/L 23   ANION GAP mmol/L 14*   BUN mg/dL 70*   CREATININE mg/dL 2.86*   CALCIUM mg/dL 9.8   ALBUMIN g/dL 2.8*   TOTAL BILIRUBIN mg/dL 0.24   ALK PHOS U/L 117*   ALT U/L 133*   AST U/L 130*   EGFR ml/min/1.73sq m 21   GLUCOSE RANDOM mg/dL 284*     Results from last 7 days   Lab Units 10/15/20  2100   INR  1.22*     Results from last 7 days   Lab Units 10/15/20  2100   TROPONIN I ng/mL <0.02     Results from last 7 days   Lab Units 10/15/20  2300 10/15/20  2100   LACTIC ACID mmol/L 3.3* 3.4*         Results from last 7 days   Lab Units 10/15/20  2100   NT-PRO BNP pg/mL 1,693*      Results from last 7 days   Lab Units 10/15/20  2302   COLOR UA  Yellow   CLARITY UA  Cloudy   SPEC GRAV UA  1.025   PH UA  5.5   LEUKOCYTES UA  Large*   NITRITE UA  Negative   GLUCOSE UA mg/dl Negative   KETONES UA mg/dl Trace*   BILIRUBIN UA  Negative   BLOOD UA  Small*               Imaging: I have personally reviewed pertinent films in PACS  Ct Abdomen Pelvis Wo Contrast  Result Date: 10/15/2020  Impression: Findings consistent with rectal impaction and constipation. No evidence of bowel obstruction, colitis or diverticulitis. Workstation performed: [id number]     XR chest portable  Date:  10/15/2020  Present impression:  Possibility of right-sided infiltrate    EKG, Pathology, and Other Studies Reviewed on Admission:   EKG  Result Date: 10/15/20  Personally reviewed strips with impression of:  Sinus tachycardia 106 bpm    Allscripts/ Epic Records Reviewed: Yes    ** Please Note: This note has been constructed using a voice recognition system.  **

## 2020-10-16 NOTE — ASSESSMENT & PLAN NOTE
· Sepsis secondary to pneumonia +/-UTI  · Cannot rule out aspiration pneumonia.   Continue ceftriaxone/metronidazole but add azithromycin for atypical coverage  · Modified diet to mechanical soft with nectar until seen by speech pathologist  · Follow-up on blood and urine cultures  · Continue IVF for lactic acidosis    Results from last 7 days   Lab Units 10/15/20  2134   SARS-COV-2  Negative     Results from last 7 days   Lab Units 10/15/20  2300 10/15/20  2100   LACTIC ACID mmol/L 3.3* 3.4*

## 2020-10-16 NOTE — PHYSICAL THERAPY NOTE
PT EVALUATION    69 y.o.    [id number]    Transaminitis [R74.01]  Constipation [K59.00]  Oxygen decrease [R09.02]  Failure to thrive in adult [R62.7]  DREW (acute kidney injury) ([address]) [N17.9]  Sepsis ([address]) [A41.9]    Past Medical History:   Diagnosis Date   • Alzheimer disease ([address])    • Ambulatory dysfunction    • CAD (coronary artery disease)    • CHF (congestive heart failure) ([facility])    • Constipation    • Dementia ([facility])    • Diabetes mellitus ([facility])    • GERD (gastroesophageal reflux disease)    • Hyperlipidemia    • Hypertension    • NSTEMI (non-ST elevated myocardial infarction) ([address])    • Parkinson disease ([address])          Past Surgical History:   Procedure Laterality Date   • BACK SURGERY          10/16/20 1228   PT Last Visit   PT Visit Date 10/16/20   Note Type   Note type Eval only   Pain Assessment   Pain Rating: FLACC (Rest) - Face 1   Pain Rating: FLACC (Rest) - Legs 1   Pain Rating: FLACC (Rest) - Activity 1   Pain Rating: FLACC (Rest) - Cry 0   Pain Rating: FLACC (Rest) - Consolability 0   Score: FLACC (Rest) 3   Home Living   Additional Comments Pt unable to report. Per chart resides with son and primarily [facility] v bedbound. Prior Function   Lives With Son   Restrictions/Precautions   Weight Bearing Precautions Per Order No   Other Precautions Cognitive; Chair Alarm; Bed Alarm;Multiple lines; Fall Risk   General   Additional Pertinent History Pt is 70 y/o male admitted with sepsis related to PNA. Family/Caregiver Present No   Cognition   Overall Cognitive Status Impaired   Arousal/Participation Responsive   Orientation Level Oriented to person  Esha Frazier" otherwise nonverbal throughout.)   Following Commands Unable to follow one step commands   Comments essentially nonverbal.  Able to express his name x 1 when questioned.    RUE Assessment   RUE Assessment X  (unable to MMT, favors UE flexed posture.)   LUE Assessment   LUE Assessment X  (unable to MMT, favors UE flexed posture.)   RLE Assessment   RLE Assessment X  (favors significant hip and knee flexion in supine)   Tone RLE   RLE Tone Hypertonic   LLE Assessment   LLE Assessment X  (favors significant hip and knee flexion in supine)   Tone LLE   LLE Tone Hypertonic   Coordination   Movements are Fluid and Coordinated 0   Coordination and Movement Description increased rigidity and tone. Bed Mobility   Rolling R 1  Dependent   Additional items Assist x 2   Rolling L 1  Dependent   Additional items Assist x 2   Supine to Sit 1  Dependent   Additional items Assist x 2   Sit to Supine 1  Dependent   Additional items Assist x 2   Additional Comments EOb sitting x 3 minutes. Brief periods of self support. Transfers   Sit to Stand Unable to assess  (not appropriate-saira rec for OOB at this time.)   Ambulation/Elevation   Gait pattern Not appropriate  (nonambulatory at baseline per chart.)   Balance   Static Sitting Fair -   Dynamic Sitting Poor +   Endurance Deficit   Endurance Deficit Yes   Endurance Deficit Description weakness, fatigue. Activity Tolerance   Activity Tolerance Patient limited by fatigue   Medical Staff Made Aware NurseKarin. OT-Davi   Nurse Made Aware yes   Assessment   Prognosis Guarded   Problem List Decreased strength;Decreased range of motion;Decreased endurance;Decreased mobility; Impaired balance;Decreased coordination;Decreased cognition; Impaired judgement;Decreased safety awareness;Decreased skin integrity   Assessment Zbigniewrijasonjose Chacon is a 69 y.o. male who presents with worsening debility and hypoxia. Admitted with sepsis due to PNA, transaminitis, DREW. Pt with HTN, Parkinsons, GERD, hyperlipidemia. Per chart resides with son. Primarily bedbound vs WC bound at home. Nonambulatory. PT consulted.  Currently presents with significant functional limitations related to cognition and ability to follow motor commands, minimally to nonverbal, increased rigidity and tone with B/L LE flexion contractures with preference for fetal position in supine,  increased UE tone. Is dependent of 2 for mobility and supine<>sit. Decreased sitting tolerance and balance. Not appropriate for OOB transfers at this time given strength and balance limitations with inability to follow motor commands. Would rec saira if OOB to appropriate seating surface at this time. Progress with therapy may be limited given cognition and dependent nature. Will require environment in which has 24* care. Can trial PT in order to optimize functional mobility while reducing caregiver burden. Goals   Patient Goals none stated   STG Expiration Date 10/26/20   Short Term Goal #1 10 days: 1). Pt will perform bed mobility with Max A of 1 in order to reduce caregiver assistance. 2)  Perform all transfers with max A of 1 in order to improve ability to transfer with less assistance from caregiver. 3)  Improve overall strength and balance 1/2 grade in order to optimize ability to perform functional tasks and reduce fall risk. 4) Increase activity tolerance to 30 minutes in order to improve endurance to functional tasks. 5) PT for ongoing patient and family/caregiver education, DME needs and d/c planning in order to promote highest level of function in least restrictive environment. Plan   Treatment/Interventions Functional transfer training; Therapeutic exercise; Endurance training;Patient/family training;Equipment eval/education; Bed mobility; Compensatory technique education;Continued evaluation;Spoke to nursing;OT   PT Frequency 1-2x/wk   Recommendation   PT Discharge Recommendation Other (Comment)  (requires 24* care. )   Equipment Recommended   (monitor)   Additional Comments Pt requires environment in which has 24* care. Will trial PT to see if able to progress and minimize caregiver assistance as currently Ax2/dependent for mobility.    Modified [place] Scale   Modified [place] Scale 5   Barthel Index   Feeding 0   Bathing 0   Grooming Score 0   Dressing Score 0   Bladder Score 0   Bowels Score 0   Toilet Use Score 0   Transfers (Bed/Chair) Score 5   Mobility (Level Surface) Score 0   Stairs Score 0   Barthel Index Score 5     History: co - morbidities, fall risk, use of assistive device, assist for adl's, cognition, multiple lines  Exam: impairments in locomotion, musculoskeletal, balance,posture, joint integrity, skin integrity, coordination, tone,  pulmonary, cognition   Clinical: unstable/unpredictable ( fall risk, cognition, Ax2-dependent, ongoing medical management for current conditions)  Complexity:high    Kecia Palomino, PT

## 2020-10-16 NOTE — OCCUPATIONAL THERAPY NOTE
Occupational Therapy Evaluation(time=1207-1224)     Patient Name: Carlos Foster  Today's Date: 10/16/2020  Problem List  Principal Problem:    Sepsis due to pneumonia [facility])  Active Problems:    Hyperlipidemia    Hypertension    Parkinson disease ([address])    Constipation    GERD (gastroesophageal reflux disease)    Diabetes mellitus ([address])    DREW (acute kidney injury) ([address])    Transaminitis    Past Medical History  Past Medical History:   Diagnosis Date   • Alzheimer disease ([address])    • Ambulatory dysfunction    • CAD (coronary artery disease)    • CHF (congestive heart failure) ([address])    • Constipation    • Dementia (HCC)    • Diabetes mellitus ([address])    • GERD (gastroesophageal reflux disease)    • Hyperlipidemia    • Hypertension    • NSTEMI (non-ST elevated myocardial infarction) ([address])    • Parkinson disease ([address])      Past Surgical History  Past Surgical History:   Procedure Laterality Date   • BACK SURGERY           10/16/20 3195   Note Type   Note type Eval only   Restrictions/Precautions   Weight Bearing Precautions Per Order No   Pain Assessment   Pain Assessment Tool FLACC   Pain Rating: FLACC (Rest) - Face 0   Pain Rating: FLACC (Rest) - Legs 0   Pain Rating: FLACC (Rest) - Activity 0   Pain Rating: FLACC (Rest) - Cry 0   Pain Rating: FLACC (Rest) - Consolability 0   Score: FLACC (Rest) 0   Home Living   Type of Home   (unable to assess 2* pt's hx dementia)   Prior Function   Lives With Son   Lifestyle   Autonomy Pt's prior level of function unavailable in chart; w/c bound per H&P. Reciprocal Relationships son   Service to Others unable to assess   Intrinsic Gratification unable to assess   Psychosocial   Psychosocial (WDL) X   Patient Behaviors/Mood Flat affect; Withdrawn   Subjective   Subjective "[place]."   ADL   Where Assessed Edge of bed   Eating Assistance 1  Total Assistance   Grooming Assistance 1  Total Assistance   UB Bathing Assistance 1  Total Assistance   LB Bathing Assistance 1 Total Assistance   UB Dressing Assistance 1  Total Assistance   LB Dressing Assistance 1  Total Assistance   Toileting Assistance  1  Total Assistance   Bed Mobility   Rolling R 1  Dependent   Rolling L 1  Dependent   Supine to Sit 1  Dependent   Sit to Supine 1  Dependent   Transfers   Sit to Stand   (N/A 2* LE contractures)   Functional Mobility   Functional Mobility   (recommend hoyerlift for OOB with nsg)   Balance   Static Sitting Fair -   Dynamic Sitting Poor +   Activity Tolerance   Activity Tolerance Patient limited by fatigue   Medical Staff Made Aware nsg, P.T.    RUE Assessment   RUE Assessment X  (pt resistive with ROM testing;actively moving b/l UE's)   RUE Strength   RUE Overall Strength   (demonstrating active resistance bilaterally)   LUE Assessment   LUE Assessment X  (pt resistive with ROM testing;actively moving b/l UE's)   LUE Strength   LUE Overall Strength   (demonstrating active resistance bilaterally)   Hand Function   Gross Motor Coordination Impaired   Fine Motor Coordination Impaired   Sensation   Light Touch   (unable to assess)   Vision - Complex Assessment   Additional Comments pt with a fixed gaze   Cognition   Overall Cognitive Status Impaired   Arousal/Participation Arousable   Attention Difficulty attending to directions   Orientation Level Oriented to person   Memory   (unable to fully assess 2* pt's limited verbalization)   Following Commands Unable to follow one step commands   Comments Pt with hx dementia   Assessment   Limitation Decreased ADL status; Decreased UE ROM; Decreased UE strength;Decreased Safe judgement during ADL;Decreased cognition;Decreased endurance;Decreased high-level ADLs; Decreased fine motor control   Prognosis Poor   Assessment Pt is a 69y/o male admitted to the hospital 2* symptoms of worsening debility, hypoxia. Pt noted with sepsis 2* PNA, UTI, and DREW. Pt with PMH Alzheimer's, ambulatory dysfunction, CHF, dementia, NSTEMI, Parkinson's, and back sx.  Pt's prior level of function unavailable in chart; w/c bound per H&P. During initial eval, pt demonstrated with his functional balance, functional mobility, ADL status, activity tolerance(currently poor=5-10mins), transfer safety, b/l UE strength, and cognition(i.e.orientation, direction-following). Currently pt is unable to follow 1 step verbal commands and demonstrating an inabilty to carryover tx techniques. Pt would best benefit from an environment that provides 24hr supervision/assistance for pt's many personal care needs. Acute OT tx not indicated at this time 2* pt's inability to demonstrate tx carryover.     Goals   Patient Goals unable to assess   Plan   OT Frequency Eval only   Recommendation   OT Discharge Recommendation   (24hr supervision/assistance)   OT - OK to Discharge Yes   Barthel Index   Feeding 0   Bathing 0   Grooming Score 0   Dressing Score 0   Bladder Score 0   Bowels Score 0   Toilet Use Score 0   Transfers (Bed/Chair) Score 5   Mobility (Level Surface) Score 0   Stairs Score 0   Barthel Index Score 5   Camacho Patrick OT

## 2020-10-16 NOTE — CONSULTS
Consultation - Nephrology   Dereje Foster 69 y.o. male MRN: [id number]  Unit/Bed#: E2 -01 Encounter: [id number]    ASSESSMENT and PLAN:  Acute kidney injury on chronic kidney disease, stage III:  - suspect acute kidney injury secondary to prerenal azotemia + Ace inhibitor use + underlying infection. - suspect underlying chronic kidney disease secondary to hypertensive nephrosclerosis + diabetic nephropathy + age-related nephron loss. - upon review of medical records, baseline creatinine appears to be around 1.4- 1.9 mg/dL. - creatinine 2.86 mg/dL upon admission.   - most recent creatinine 2.40 mg/dL. - post 500 mL bolus normal saline upon admission.    - post half-normal saline.    - will initiate sodium bicarbonate infusion at 100 mL/hour for now. - UA revealed trace ketones, small blood, large leukocytes, +2 protein, 1-2 RBC, innumerable WBC + moderate bacteria. - CT scan revealed extensive renal vascular calcifications. No evidence of nephrolithiasis or obstructive uropathy. - check PVR with bladder scan. Maintain urinary retention protocol.   - monitor volume status with strict intake/output. Daily weight. - avoid NSAIDS, nephrotoxic agents, IV contrast.   - repeat BMP in a.m. Electrolytes:  - hyperchloremic hypernatremia with most recent sodium 146 mEq from 147 mEq upon admission.   - adjust fluids as above. - mild hyperkalemia with most recent potassium 5.0.   - adjust fluids as above. - will continue to monitor with repeat lab studies. Acid/base:  - most recent bicarbonate decrease in 19 with anion gap of 13.    - adjust fluids as above. - will continue to monitor with repeat lab studies. Hypertension:  - outpatient regimen includes: Lisinopril 10 mg daily. - currently off antihypertensive medications. - continue to hold lisinopril in the setting of acute kidney injury.    - maintain MAP > 65.   - optimize hemodynamics; avoid hypotension and fluctuations of blood pressure. Anemia likely of chronic disease:  - most recent hemoglobin 9 grams/deciliter.  - goal hemoglobin greater than 8 grams/deciliter. - recommend to check iron panel post acute infection. - recommend PRBC transfusion for hemoglobin less than 7.  - management per primary team.    Sepsis:  - suspect secondary to pneumonia +/- UTI. - follow-up on blood and urine cultures. - currently on antibiotics per primary team.    DM:  - most recent hemoglobin A1c 7.3.  - currently on insulin.  - management per primary team.    Parkinson's disease:  - minimal verbal at baseline; essentially bed/wheelchair-bound. HISTORY OF PRESENT ILLNESS:  Requesting Physician: Nicky Lilly DO  Reason for Consult: Acute kidney injury     Alexandr Barragan is a 69 y.o. male with history of Parkinson's/Alzheimer's disease, CAD, CHF, diabetes, GERD, hypertension, chronic kidney disease, stage III who was admitted to [facility] after presenting with worsening debility and hypoxia. History obtained by primary team from son at bedside upon admission. Patient has been taking his medication, however patient has not been eating solid foods and has had poor oral intake. At baseline, patient is wheelchair/bedbound and is not very verbal.  Sent took the patient to PCP office today due to concerns of his renal injury, his metformin was reduced. He was noted to be hypoxic and recommendation was to go to emergency department for evaluation, however son took patient home instead. Given worsening work of breathing, patient was brought to the hospital where he remained lethargic and was found to have acute kidney injury. Upon assessment and evaluation, patient opens eyes to stimulation. Patient is resting in bed comfortably. He is nonverbal.     A renal consultation is requested today for assistance in the management of acute kidney injury.     PAST MEDICAL HISTORY:  Past Medical History:   Diagnosis Date   • Alzheimer disease [facility])    • Ambulatory dysfunction    • CAD (coronary artery disease)    • CHF (congestive heart failure) ([facility])    • Constipation    • Dementia ([facility])    • Diabetes mellitus ([facility])    • GERD (gastroesophageal reflux disease)    • Hyperlipidemia    • Hypertension    • NSTEMI (non-ST elevated myocardial infarction) ([facility])    • Parkinson disease ([facility])        PAST SURGICAL HISTORY:  Past Surgical History:   Procedure Laterality Date   • BACK SURGERY         ALLERGIES:  Allergies   Allergen Reactions   • Rice        SOCIAL HISTORY:  Social History     Substance and Sexual Activity   Alcohol Use Not Currently     Social History     Substance and Sexual Activity   Drug Use Never     Social History     Tobacco Use   Smoking Status Former Smoker   Smokeless Tobacco Never Used       FAMILY HISTORY:  History reviewed. No pertinent family history.     MEDICATIONS:    Current Facility-Administered Medications:   •  acetaminophen (TYLENOL) tablet 650 mg, 650 mg, Oral, Q6H PRN, Viviana Wen DO  •  atorvastatin (LIPITOR) tablet 40 mg, 40 mg, Oral, Daily With Dinner, Young Medel DO  •  azithromycin (ZITHROMAX) 500 mg in sodium chloride 0.9 % 250 mL IVPB, 500 mg, Intravenous, Q24H, SID Bojorquez, Last Rate: 250 mL/hr at 10/16/20 0537, 500 mg at 10/16/20 0537  •  carbidopa-levodopa (SINEMET)  mg per tablet 1 tablet, 1 tablet, Oral, TID, Young Medel DO  •  cefTRIAXone (ROCEPHIN) 1,000 mg in dextrose 5 % 50 mL IVPB, 1,000 mg, Intravenous, Q24H **AND** [DISCONTINUED] azithromycin (ZITHROMAX) tablet 500 mg, 500 mg, Oral, Q24H, Young Medel DO  •  heparin (porcine) subcutaneous injection 5,000 Units, 5,000 Units, Subcutaneous, Q8H [address], Young Medel DO, 5,000 Units at 10/16/20 0315  •  insulin lispro (HumaLOG) 100 units/mL subcutaneous injection 1-6 Units, 1-6 Units, Subcutaneous, 4x Daily (AC & HS), 3 Units at 10/16/20 0739 **AND** Fingerstick Glucose (POCT), , , 4x Daily AC and at bedtime, [facility],   •  lactulose oral solution 20 g, 20 g, Oral, TID, Young Medel,   •  levalbuterol (XOPENEX) inhalation solution 1.25 mg, 1.25 mg, Nebulization, Q6H PRN, Lelia Medel DO  •  metroNIDAZOLE (FLAGYL) IVPB (premix) 500 mg 100 mL, 500 mg, Intravenous, Q8H, Young Medel DO, Last Rate: 200 mL/hr at 10/16/20 0739, 500 mg at 10/16/20 0739  •  ondansetron (ZOFRAN) injection 4 mg, 4 mg, Intravenous, Q6H PRN, Kayla Bauman DO  •  pantoprazole (PROTONIX) EC tablet 40 mg, 40 mg, Oral, Early Morning, Young Medel DO  •  polyethylene glycol (MIRALAX) packet 17 g, 17 g, Oral, BID, Young Medel,   •  traZODone (DESYREL) tablet 150 mg, 150 mg, Oral, HS, Young Medel DO    REVIEW OF SYSTEMS:  All the systems were reviewed and were negative except as documented on the HPI.     PHYSICAL EXAM:  Current Weight: Weight - Scale: 59.1 kg (130 lb 4.7 oz)  First Weight: Weight - Scale: 59.1 kg (130 lb 4.7 oz)  Vitals:    10/15/20 2345 10/16/20 0030 10/16/20 0059 10/16/20 0803   BP: 161/70 159/69 (!) 164/49 145/74   BP Location: Right arm Right arm     Pulse: 84 84 78 72   Resp: (!) 28 22 20 18   Temp:   98.3 °F (36.8 °C) 98 °F (36.7 °C)   TempSrc:   Temporal    SpO2: 98% 97% 97% 98%   Weight:   59.1 kg (130 lb 4.7 oz)        Intake/Output Summary (Last 24 hours) at 10/16/2020 9957  Last data filed at 10/16/2020 0537  Gross per 24 hour   Intake 848.8 ml   Output 210 ml   Net 638.8 ml     General: conscious, coherent, cooperative, not in acute distress  Skin: no rash, warm  Eyes: pale conjunctivae, anicteric sclerae  ENT:  Dry lips and mucous membranes  Neck: supple with trachea midline  Chest:  Diminished breath sounds  CVS: distinct S1 & S2, normal rate, regular rhythm  Abdomen: soft, non-tender, non-distended, normoactive bowel sounds  Extremities: no edema of both legs  Neuro:  Lethargic, opens eyes to stimulation  Psych:  Unable to assess      Invasive Devices:        Lab Results:   Results from last 7 days   Lab Units 10/16/20  0502 10/15/20  2100   WBC Thousand/uL 17.23* 17.47*   HEMOGLOBIN g/dL 9.0* 9.4*   HEMATOCRIT % 31.0* 31.8*   PLATELETS Thousands/uL 283 331   POTASSIUM mmol/L 5.0 5.1   CHLORIDE mmol/L 114* 110*   CO2 mmol/L 19* 23   BUN mg/dL 68* 70*   CREATININE mg/dL 2.40* 2.86*   CALCIUM mg/dL 9.2 9.8   ALK PHOS U/L 106 117*   ALT U/L 150* 133*   AST U/L 89* 130*

## 2020-10-16 NOTE — ASSESSMENT & PLAN NOTE
Lab Results   Component Value Date    HGBA1C 7.3 (H) 08/26/2020     Results from last 7 days   Lab Units 10/15/20  2100   GLUCOSE RANDOM mg/dL 284*     · Diabetes mellitus with hyperglycemia. Hold metformin given kidney injury. Of note it was reduced to 500 mg b.i.d.  Today by PCP due to advancing kidney disease  · Add sliding scale insulin during hospitalization

## 2020-10-16 NOTE — ASSESSMENT & PLAN NOTE
· DREW likely secondary to poor intake with lisinopril use  · Hold lisinopril. Given hyponatremia start 1/2 NSS and recheck labs in a.m.  · Hold metformin.     Results from last 7 days   Lab Units 10/15/20  2100   BUN mg/dL 70*   CREATININE mg/dL 2.86*

## 2020-10-16 NOTE — ED PROVIDER NOTES
History  Chief Complaint   Patient presents with   • Decreased Oxygen Level     Pts son states if pts oxygen gets low to come into hospital.  Pt was having low O2 sats at home. Pts son states he also has lost his appetite and is losing weight, also does not ambulate anymore (though he did not ambulate much to begin with). 79-year-old male Portuguese-speaking only and with dementia/Parkinson's presenting with son for evaluation of failure to thrive and cough. Lives at home with son who is providing history through [place] . Son states that for the past week he has not been eating or drinking anything. He has had no appetite and is losing weight. Son reports a cough. Reportedly had a low oxygen saturation earlier today, however is 100% on room air currently. Does have history of dysphagia and there reportedly was concern for possible aspiration. No known sick contacts. No COVID exposures. Unable to obtain any history from pt. Son states he is at his baseline mental status. Denies any falls/head trauma, nonambulatory at baseline. MDM: 68 yo M with failure to thrive/cough, found to have fever- sepsis workup, cardiac workup, admit             Prior to Admission Medications   Prescriptions Last Dose Informant Patient Reported?  Taking?   bisacodyl (DULCOLAX) 10 mg suppository   Yes No   Sig: Insert 10 mg into the rectum daily as needed for constipation   carbidopa-levodopa (SINEMET)  mg per tablet   Yes Yes   Sig: Take 1 tablet by mouth Three times a day   lisinopril (ZESTRIL) 10 mg tablet   Yes No   Sig: Take 10 mg by mouth daily   metFORMIN (GLUCOPHAGE) 1000 MG tablet  Child Yes No   Sig: Take 500 mg by mouth 2 (two) times a day with meals    omeprazole (PriLOSEC) 20 mg delayed release capsule   Yes Yes   Sig: Take 20 mg by mouth daily   rosuvastatin (CRESTOR) 20 MG tablet   Yes No   Sig: Take 20 mg by mouth daily   traZODone (DESYREL) 50 mg tablet   Yes No   Sig: Take 150 mg by mouth daily at bedtime       Facility-Administered Medications: None       Past Medical History:   Diagnosis Date   • Alzheimer disease ([address])    • Ambulatory dysfunction    • CAD (coronary artery disease)    • CHF (congestive heart failure) ([facility])    • Constipation    • Dementia ([facility])    • Diabetes mellitus ([address])    • GERD (gastroesophageal reflux disease)    • Hyperlipidemia    • Hypertension    • NSTEMI (non-ST elevated myocardial infarction) ([address])    • Parkinson disease ([address])        Past Surgical History:   Procedure Laterality Date   • BACK SURGERY         History reviewed. No pertinent family history. I have reviewed and agree with the history as documented. E-Cigarette/Vaping     E-Cigarette/Vaping Substances     Social History     Tobacco Use   • Smoking status: Former Smoker   • Smokeless tobacco: Never Used   Substance Use Topics   • Alcohol use: Not Currently   • Drug use: Never       Review of Systems   Unable to perform ROS: Dementia   Respiratory: Positive for cough. Physical Exam  Physical Exam  Vitals signs and nursing note reviewed. Constitutional:       Appearance: He is cachectic. Comments: Chronically ill appearing, resting comfortably   HENT:      Head: Normocephalic and atraumatic. Mouth/Throat:      Mouth: Mucous membranes are dry. Eyes:      Extraocular Movements: Extraocular movements intact. Conjunctiva/sclera: Conjunctivae normal.   Cardiovascular:      Rate and Rhythm: Regular rhythm. Tachycardia present. Pulmonary:      Effort: No respiratory distress. Breath sounds: Rhonchi present. Abdominal:      General: Abdomen is flat. There is no distension. Palpations: There is no mass. Tenderness: There is no abdominal tenderness. There is no guarding or rebound. Musculoskeletal:      Comments: Lower extremity contractures with knees in flexion, unable to extend. No peripheral edema   Skin:     General: Skin is warm. Findings: No rash.    Neurological: Mental Status: He is alert. Comments: Able to follow commands, Able to lift b/l UE up against gravity, unable to lift legs (baseline per son). Unable to assess orientation, sensation, coordination, vision         Vital Signs  ED Triage Vitals   Temperature Pulse Respirations Blood Pressure SpO2   10/15/20 2007 10/15/20 2007 10/15/20 2007 10/15/20 2007 10/15/20 2007   99.3 °F (37.4 °C) (!) 111 20 135/61 98 %      Temp Source Heart Rate Source Patient Position - Orthostatic VS BP Location FiO2 (%)   10/15/20 2007 10/15/20 2007 10/15/20 2007 10/15/20 2007 --   Temporal Monitor Lying Right arm       Pain Score       10/15/20 2145       Med Not Given for Pain - for MAR use only           Vitals:    10/15/20 2145 10/15/20 2215 10/15/20 2300 10/15/20 2330   BP: 139/63 156/69 149/65 149/66   Pulse: 94 86 90 82   Patient Position - Orthostatic VS: Lying Lying Lying Lying         Visual Acuity      ED Medications  Medications   acetaminophen (TYLENOL) rectal suppository 650 mg (650 mg Rectal Given 10/15/20 2145)   sodium chloride 0.9 % bolus 500 mL (0 mL Intravenous Stopped 10/15/20 2247)   ceftriaxone (ROCEPHIN) 2 g/50 mL in dextrose IVPB (0 mg Intravenous Stopped 10/15/20 2302)   metroNIDAZOLE (FLAGYL) IVPB (premix) 500 mg 100 mL (500 mg Intravenous New Bag 10/15/20 2304)       Diagnostic Studies  Results Reviewed     Procedure Component Value Units Date/Time    Urine culture [[id number]]     Lab Status:  No result Specimen:  Urine     Lactic acid 2 Hours [[id number]]  (Abnormal) Collected:  10/15/20 2300    Lab Status:  Final result Specimen:  Blood from Arm, Right Updated:  10/15/20 2344     LACTIC ACID 3.3 mmol/L     Narrative:       Result may be elevated if tourniquet was used during collection.     UA w Reflex to Microscopic w Reflex to Culture [[id number]]  (Abnormal) Collected:  10/15/20 2302    Lab Status:  Final result Specimen:  Urine, Straight Cath Updated:  10/15/20 2324     Color, UA Yellow     Clarity, UA Cloudy     Specific Gravity, UA 1.025     pH, UA 5.5     Leukocytes, UA Large     Nitrite, UA Negative     Protein,  (2+) mg/dl      Glucose, UA Negative mg/dl      Ketones, UA Trace mg/dl      Urobilinogen, UA 1.0 E.U./dl      Bilirubin, UA Negative     Blood, UA Small    Urine Microscopic [[id number]] Collected:  10/15/20 2302    Lab Status: In process Specimen:  Urine, Straight Cath Updated:  10/15/20 2324    Novel Coronavirus (Covid-19),PCR [facility] [[id number]]  (Normal) Collected:  10/15/20 2134    Lab Status:  Final result Specimen:  Nares from Nose Updated:  10/15/20 2231     SARS-CoV-2 Negative    Narrative: The specimen collection materials, transport medium, and/or testing methodology utilized in the production of these test results have been proven to be reliable in a limited validation with an abbreviated program under the Emergency Utilization Authorization provided by the FDA. Testing reported as "Presumptive positive" will be confirmed with secondary testing with a reference laboratory to ensure result accuracy. Clinical caution and judgement should be used with the interpretation of these results with consideration of the clinical impression and other laboratory testing. Testing reported as "Positive" or "Negative" has been proven to be accurate according to standard laboratory validation requirements. All testing is performed with control materials showing appropriate reactivity at standard intervals.       Lipase [[id number]]     Lab Status:  No result Specimen:  Blood     Comprehensive metabolic panel [[id number]]  (Abnormal) Collected:  10/15/20 2100    Lab Status:  Final result Specimen:  Blood from Arm, Right Updated:  10/15/20 2202     Sodium 147 mmol/L      Potassium 5.1 mmol/L      Chloride 110 mmol/L      CO2 23 mmol/L      ANION GAP 14 mmol/L      BUN 70 mg/dL      Creatinine 2.86 mg/dL      Glucose 284 mg/dL      Calcium 9.8 mg/dL      Corrected Calcium 10.8 mg/dL       U/L       U/L      Alkaline Phosphatase 117 U/L      Total Protein 9.1 g/dL      Albumin 2.8 g/dL      Total Bilirubin 0.24 mg/dL      eGFR 21 ml/min/1.73sq m     Narrative:       [facility] guidelines for Chronic Kidney Disease (CKD):   •  Stage 1 with normal or high GFR (GFR > 90 mL/min/1.73 square meters)  •  Stage 2 Mild CKD (GFR = 60-89 mL/min/1.73 square meters)  •  Stage 3A Moderate CKD (GFR = 45-59 mL/min/1.73 square meters)  •  Stage 3B Moderate CKD (GFR = 30-44 mL/min/1.73 square meters)  •  Stage 4 Severe CKD (GFR = 15-29 mL/min/1.73 square meters)  •  Stage 5 End Stage CKD (GFR <15 mL/min/1.73 square meters)  Note: GFR calculation is accurate only with a steady state creatinine    NT-BNP PRO [[id number]]  (Abnormal) Collected:  10/15/20 2100    Lab Status:  Final result Specimen:  Blood from Arm, Right Updated:  10/15/20 2152     NT-proBNP 1,693 pg/mL     Lactic acid [[id number]]  (Abnormal) Collected:  10/15/20 2100    Lab Status:  Final result Specimen:  Blood from Arm, Right Updated:  10/15/20 2149     LACTIC ACID 3.4 mmol/L     Narrative:       Result may be elevated if tourniquet was used during collection.     Troponin I [[id number]]  (Normal) Collected:  10/15/20 2100    Lab Status:  Final result Specimen:  Blood from Arm, Right Updated:  10/15/20 2149     Troponin I <0.02 ng/mL     APTT [[id number]]  (Normal) Collected:  10/15/20 2100    Lab Status:  Final result Specimen:  Blood from Arm, Right Updated:  10/15/20 2138     PTT 33 seconds     Protime-INR [[id number]]  (Abnormal) Collected:  10/15/20 2100    Lab Status:  Final result Specimen:  Blood from Arm, Right Updated:  10/15/20 2138     Protime 15.2 seconds      INR 1.22    Blood gas, venous [[id number]]  (Abnormal) Collected:  10/15/20 2110    Lab Status:  Final result Specimen:  Blood from Arm, Left Updated:  10/15/20 2130     pH, Francesco 7.390     pCO2, Francesco 34.7 mm Hg      pO2, Francesco 37.6 mm Hg      HCO3, Francesco 20.5 mmol/L Base Excess, Francesco -3.8 mmol/L      O2 Content, Francesco 9.8 ml/dL      O2 HGB, VENOUS 67.6 %     CBC and differential [[id number]]  (Abnormal) Collected:  10/15/20 2100    Lab Status:  Final result Specimen:  Blood from Arm, Right Updated:  10/15/20 2125     WBC 17.47 Thousand/uL      RBC 3.25 Million/uL      Hemoglobin 9.4 g/dL      Hematocrit 31.8 %      MCV 98 fL      MCH 28.9 pg      MCHC 29.6 g/dL      RDW 12.0 %      MPV 11.1 fL      Platelets 658 Thousands/uL      nRBC 0 /100 WBCs      Neutrophils Relative 84 %      Immat GRANS % 0 %      Lymphocytes Relative 11 %      Monocytes Relative 5 %      Eosinophils Relative 0 %      Basophils Relative 0 %      Neutrophils Absolute 14.61 Thousands/µL      Immature Grans Absolute 0.12 Thousand/uL      Lymphocytes Absolute 1.85 Thousands/µL      Monocytes Absolute 0.80 Thousand/µL      Eosinophils Absolute 0.03 Thousand/µL      Basophils Absolute 0.06 Thousands/µL     Blood culture #2 [[id number]] Collected:  10/15/20 2105    Lab Status: In process Specimen:  Blood from Arm, Left Updated:  10/15/20 2116    Blood culture #1 [[id number]] Collected:  10/15/20 2055    Lab Status: In process Specimen:  Blood from Arm, Right Updated:  10/15/20 2112    Procalcitonin with AM Reflex [[id number]] Collected:  10/15/20 2100    Lab Status: In process Specimen:  Blood from Arm, Right Updated:  10/15/20 2111                 CT abdomen pelvis wo contrast   ED Interpretation by Mony Marlow DO (10/15 7983)   CT ABDOMEN AND PELVIS WITHOUT IV CONTRAST     INDICATION:   Failure to thrive and elevated liver function tests.     COMPARISON:  10/21/2019.     TECHNIQUE:  CT examination of the abdomen and pelvis was performed without intravenous contrast.  Axial, sagittal, and coronal 2D reformatted images were created from the source data and submitted for interpretation.      Radiation dose length product (DLP) for this visit:  320 mGy-cm .   This examination, like all CT scans performed in the [facility], was performed utilizing techniques to minimize radiation dose exposure, including the use of iterative   reconstruction and automated exposure control.      Enteric contrast was not administered.      FINDINGS:     ABDOMEN     LOWER CHEST:  Clear lung bases.     LIVER/BILIARY TREE:  No visualized hepatic lesion on noncontrast images. No biliary obstruction.     GALLBLADDER:  No calcified gallstones. No pericholecystic inflammatory change.     SPLEEN:  Unremarkable.     PANCREA   S:  Unremarkable.     ADRENAL GLANDS:  Unremarkable.     KIDNEYS/URETERS:  Extensive renal vascular calcifications. No evidence of nephrolithiasis or obstructive uropathy.     STOMACH AND BOWEL:  Stool markedly distending the rectum. Large amount of stool in the right colon and sigmoid colon. No evidence of bowel obstruction, colitis or diverticulitis.     APPENDIX:  Normal appendix.     ABDOMINOPELVIC CAVITY:  No ascites. No pneumoperitoneum. No lymphadenopathy.     VESSELS:  Diffuse calcified plaque throughout the aortoiliac arteries.     PELVIS     REPRODUCTIVE ORGANS:  No prostate enlargement.     URINARY BLADDER:  Unremarkable.     ABDOMINAL WALL/INGUINAL REGIONS:  Unremarkable.     OSSEOUS STRUCTURES:  No acute fracture or destructive osseous lesion.     IMPRESSION:     Findings consistent with rectal impaction and constipation. No evidence of bowel obstruction, colitis or diverticulitis. Final Result by Naun Victor MD (10/15 0020)      Findings consistent with rectal impaction and constipation. No evidence of bowel obstruction, colitis or diverticulitis.             Workstation performed: [id number]         XR chest 1 view portable    (Results Pending)              Procedures  Procedures         ED Course  ED Course as of Oct 16 0000   Thu Oct 15, 2020   2110 Pulse(!): 111   2110 Temperature: 99.3 °F (37.4 °C)   2113 EKG: sinus tachycardia @ 106 bpm, normal axis, normal intervals, no ST changes      2125 WBC(!): 17.47   2125 11.2 1 year ago   Hemoglobin(!): 9.4   2207 1.44 1 year ago   Creatinine(!): 2.86   2207 AST(!): 130   2207 ALT(!): 133   2207 Alkaline Phosphatase(!): 117   2208 Temperature: 100.4 °F (38 °C)   2208 NT-proBNP(!): 1,693   2210 LACTIC ACID(!!): 3.4   2248 CXR: possible RUL infiltrate      2319 Messaged SLIM for admission      2328 CT showing constipation/impaction, h/o chronic constipation, pt had BM with large stool ball in ED per nursing      2344 Leukocytes, UA(!): Large                       Initial Sepsis Screening     Row Name 10/15/20 2118                Is the patient's history suggestive of a new or worsening infection? (!) Yes (Proceed)  [id number]        Suspected source of infection  pneumonia  -KH        Are two or more of the following signs & symptoms of infection both present and new to the patient? (!) Yes (Proceed)  -KH        Indicate SIRS criteria  Tachycardia > 90 bpm;Tachypnea > 20 resp per min  -        If the answer is yes to both questions, suspicion of sepsis is present  --        If severe sepsis is present AND tissue hypoperfusion perists in the hour after fluid resuscitation or lactate > 4, the patient meets criteria for SEPTIC SHOCK  --        Are any of the following organ dysfunction criteria present within 6 hours of suspected infection and SIRS criteria that are NOT considered to be chronic conditions?   No  -KH        Organ dysfunction  Creatinine > 0.5 mg/dl ABOVE BASELINE;Lactate > 2.0 mmol/L  -        Date of presentation of severe sepsis  10/15/20  -        Time of presentation of severe sepsis  Formerly named [facility]  [facility]        Tissue hypoperfusion persists in the hour after crystalloid fluid administration, evidenced, by either:  --        Was hypotension present within one hour of the conclusion of crystalloid fluid administration?  --        Date of presentation of septic shock  --        Time of presentation of septic shock  --          User Key (r) = Recorded By, (t) = Taken By, (c) = Cosigned By    [facility] Name Provider Type    Mario Bryd DO Physician           Default Flowsheet Data (last 720 hours)      Sepsis Reassess     Row Name 10/15/20 3137                   Repeat Volume Status and Tissue Perfusion Assessment Performed    Repeat Volume Status and Tissue Perfusion Assessment Performed  Yes  -KH           Volume Status and Tissue Perfusion Post Fluid Resuscitation * Must Document All *    Vital Signs Reviewed (HR, RR, BP, T)  Yes  -        Shock Index Reviewed  Yes  -KH        Arterial Oxygen Saturation Reviewed (POx, SaO2 or SpO2)  Yes (comment %)  -        Cardio  Normal S1/S2; Regular rate and rhythm  -        Pulmonary  Normal effort;Clear to auscultation  -        Capillary Refill  Brisk  -        Peripheral Pulses  Radial;Dorsalis Pedis; Posterior Tibialis  -        Peripheral Pulse  +1  -KH        Dorsalis Pedis  +1  -KH        Posterior Tibialis  +1  -KH        Skin  Warm;Dry  -        Urine output assessed  Adequate  -KH           *OR*   Intensive Monitoring- Must Document One of the Following Four *:    Vital Signs Reviewed  --        * Central Venous Pressure (CVP or RAP)  --        * Central Venous Oxygen (SVO2, ScvO2 or Oxygen saturation via central catheter)  --        * Bedside Cardiovascular US in IVC diameter and % collapse  --        * Passive Leg Raise OR Crystalloid Challenge  --          User Key  (r) = Recorded By, (t) = Taken By, (c) = Cosigned By    Initials Name Provider Type    Mario Byrd DO Physician                    MDM  Number of Diagnoses or Management Options  DREW (acute kidney injury) ([address]):   Constipation:   Failure to thrive in adult:   Sepsis [facility]):   Transaminitis:   Diagnosis management comments: 70 yo M with generalized weakness/decreased appetite, found to have fever of unclear source- possibly PNA/urine. Very dehydrated and with DREW.  Elevated LFTs but no CT scan findings of acute cholecystitis/cholelithiasis, unclear cause. Given IV abx (ceftriaxone and flagyl), admitted for further workup/management       Amount and/or Complexity of Data Reviewed  Clinical lab tests: reviewed and ordered  Tests in the radiology section of CPT®: ordered and reviewed  Tests in the medicine section of CPT®: ordered and reviewed  Review and summarize past medical records: yes  Independent visualization of images, tracings, or specimens: yes        Disposition  Final diagnoses:   DREW (acute kidney injury) ([address])   Transaminitis   Failure to thrive in adult   Sepsis ([address])   Constipation     Time reflects when diagnosis was documented in both MDM as applicable and the Disposition within this note     Time User Action Codes Description Comment    10/15/2020 10:09 PM Nic Mendez Add [N17.9] DREW (acute kidney injury) ([address])     10/15/2020 10:09 PM Alvina Fluke A Add [R74.01] Transaminitis     10/15/2020 10:09 PM Nic Mendez Add [R62.7] Failure to thrive in adult     10/15/2020 11:19 PM Nic Mendez Add [A41.9] Sepsis ([address])     10/15/2020 11:26 PM Alvina Fluke A Add [K59.00] Constipation       ED Disposition     ED Disposition Condition Date/Time Comment    Admit Stable Thu Oct 15, 2020 11:19 PM Case was discussed with ISAAK and the patient's admission status was agreed to be Admission Status: inpatient status to the service of Dr. Jocelyn Paiz . Follow-up Information    None         Patient's Medications   Discharge Prescriptions    No medications on file     No discharge procedures on file.     PDMP Review     None          ED Provider  Electronically Signed by           Nic Mendez DO  10/16/20 0000

## 2020-10-16 NOTE — PROGRESS NOTES
Progress Note - Marc Lopesorel 69 y.o. male MRN: [id number]    Unit/Bed#: E2 -01 Encounter: [id number]    Assessment/Plan:    Sepsis    POA and related to pneumonia, patient with a history dysphagia concern for aspiration, continue Rocephin azithromycin and Flagyl, aspiration precaution, hemodynamically stable    Transaminitis   possibly elevated with acute illness continue to monitor    Hypernatremia   sodium slightly improved to 146 continue to monitor    A KI/CKD 3   creatinine slightly improved 2.40 will consult Nephrology appears baseline creatinine 1.5, avoid nephrotoxic agent         Dyslipidemia   continue statin for LDL control    Diabetes   continue sliding scale and ADA diet    GERD    continue PPI for acid control    Dysphagia   continue dysphagia diet with aspiration precaution    Lactic acidosis  POA related to infection dehydration now resolved DC IV fluids    Parkinson's   continue Sinemet, await speech PT OT eval    Subjective:   Non Verbal      Objective:     Vitals: Blood pressure 145/74, pulse 72, temperature 98 °F (36.7 °C), resp. rate 18, weight 59.1 kg (130 lb 4.7 oz), SpO2 98 %. ,Body mass index is 20.41 kg/m².       Results from last 7 days   Lab Units 10/16/20  0502 10/15/20  2100   WBC Thousand/uL 17.23* 17.47*   HEMOGLOBIN g/dL 9.0* 9.4*   HEMATOCRIT % 31.0* 31.8*   PLATELETS Thousands/uL 283 331   INR   --  1.22*     Results from last 7 days   Lab Units 10/16/20  0502   POTASSIUM mmol/L 5.0   CHLORIDE mmol/L 114*   CO2 mmol/L 19*   BUN mg/dL 68*   CREATININE mg/dL 2.40*   CALCIUM mg/dL 9.2   ALK PHOS U/L 106   ALT U/L 150*   AST U/L 89*       Scheduled Meds:  Current Facility-Administered Medications   Medication Dose Route Frequency Provider Last Rate   • acetaminophen  650 mg Oral Q6H PRN Magdalene Kehr, DO     • atorvastatin  40 mg Oral Daily With Bergey's, DO     • azithromycin  500 mg Intravenous Q24H SID Fong 500 mg (10/16/20 0537) • carbidopa-levodopa  1 tablet Oral TID Chato Pounds, DO     • cefTRIAXone  1,000 mg Intravenous Q24H Chato Pounds, DO     • heparin (porcine)  5,000 Units Subcutaneous [facility] Youngashleigh Medel, DO     • insulin lispro  1-6 Units Subcutaneous 4x Daily (AC & HS) Chato Pounds, DO     • lactulose  20 g Oral TID Chato Pounds, DO     • levalbuterol  1.25 mg Nebulization Q6H PRN Chato Pounds, DO     • metroNIDAZOLE  500 mg Intravenous Q8H Young Medel,  mg (10/16/20 0739)   • ondansetron  4 mg Intravenous Q6H PRN Chato Pounds, DO     • pantoprazole  40 mg Oral Early Morning Young Gianfranco, DO     • polyethylene glycol  17 g Oral BID Chato Pounds, DO     • traZODone  150 mg Oral HS Young Gianfranco, DO         Continuous Infusions:     Physical exam:  General appearance:  Alert poor interaction nonverbal  Head/Eyes:  Nonicteric sluggish  Neck:  Supple  Lungs:  Decreased BS poor effort bilateral no wheezing rhonchi or rales  Heart: normal S1 S2 regular  Abdomen: Soft nontender with bowel sounds  Extremities: no edema  Skin: no rash    Invasive Devices     Peripheral Intravenous Line            Peripheral IV 10/15/20 Left Antecubital less than 1 day    Peripheral IV 10/15/20 Right Antecubital less than 1 day                  VTE Pharmacologic Prophylaxis:  Heparin      Counseling / Coordination of Care  Total floor / unit time spent today 30  minutes. Greater than 50% of total time was spent with the patient and / or family counseling and / or coordination of care.   A description of the counseling / coordination of care:

## 2020-10-16 NOTE — CASE MANAGEMENT
Pt non verbal according to notes. Son Melvina Faster called and unable to understand CM. CM called back using  line charlene/ [employer] # [phone] - attempted to call house and mobile lines several times with no answer.

## 2020-10-16 NOTE — PLAN OF CARE
Problem: Potential for Falls  Goal: Patient will remain free of falls  Description: INTERVENTIONS:  - Assess patient frequently for physical needs  -  Identify cognitive and physical deficits and behaviors that affect risk of falls.   -  [place] fall precautions as indicated by assessment.  - Educate patient/family on patient safety including physical limitations  - Instruct patient to call for assistance with activity based on assessment  - Modify environment to reduce risk of injury  - Consider OT/PT consult to assist with strengthening/mobility  Outcome: Progressing     Problem: Prexisting or High Potential for Compromised Skin Integrity  Goal: Skin integrity is maintained or improved  Description: INTERVENTIONS:  - Identify patients at risk for skin breakdown  - Assess and monitor skin integrity  - Assess and monitor nutrition and hydration status  - Monitor labs   - Assess for incontinence   - Turn and reposition patient  - Assist with mobility/ambulation  - Relieve pressure over bony prominences  - Avoid friction and shearing  - Provide appropriate hygiene as needed including keeping skin clean and dry  - Evaluate need for skin moisturizer/barrier cream  - Collaborate with interdisciplinary team   - Patient/family teaching  - Consider wound care consult   Outcome: Progressing     Problem: DISCHARGE PLANNING - CARE MANAGEMENT  Goal: Discharge to post-acute care or home with appropriate resources  Description: INTERVENTIONS:  - Conduct assessment to determine patient/family and health care team treatment goals, and need for post-acute services based on payer coverage, community resources, and patient preferences, and barriers to discharge  - Address psychosocial, clinical, and financial barriers to discharge as identified in assessment in conjunction with the patient/family and health care team  - Arrange appropriate level of post-acute services according to patient’s   needs and preference and payer coverage in collaboration with the physician and health care team  - Communicate with and update the patient/family, physician, and health care team regarding progress on the discharge plan  - Arrange appropriate transportation to post-acute venues  Outcome: Progressing     Problem: INFECTION - ADULT  Goal: Absence or prevention of progression during hospitalization  Description: INTERVENTIONS:  - Assess and monitor for signs and symptoms of infection  - Monitor lab/diagnostic results  - Monitor all insertion sites, i.e. indwelling lines, tubes, and drains  - Monitor endotracheal if appropriate and nasal secretions for changes in amount and color  - [place] appropriate cooling/warming therapies per order  - Administer medications as ordered  - Instruct and encourage patient and family to use good hand hygiene technique  - Identify and instruct in appropriate isolation precautions for identified infection/condition  Outcome: Progressing  Goal: Absence of fever/infection during neutropenic period  Description: INTERVENTIONS:  - Monitor WBC    Outcome: Progressing     Problem: SAFETY ADULT  Goal: Patient will remain free of falls  Description: INTERVENTIONS:  - Assess patient frequently for physical needs  -  Identify cognitive and physical deficits and behaviors that affect risk of falls.   -  [place] fall precautions as indicated by assessment.  - Educate patient/family on patient safety including physical limitations  - Instruct patient to call for assistance with activity based on assessment  - Modify environment to reduce risk of injury  - Consider OT/PT consult to assist with strengthening/mobility  Outcome: Progressing  Goal: Maintain or return to baseline ADL function  Description: INTERVENTIONS:  -  Assess patient's ability to carry out ADLs; assess patient's baseline for ADL function and identify physical deficits which impact ability to perform ADLs (bathing, care of mouth/teeth, toileting, grooming, dressing, etc.)  - Assess/evaluate cause of self-care deficits   - Assess range of motion  - Assess patient's mobility; develop plan if impaired  - Assess patient's need for assistive devices and provide as appropriate  - Encourage maximum independence but intervene and supervise when necessary  - Involve family in performance of ADLs  - Assess for home care needs following discharge   - Consider OT consult to assist with ADL evaluation and planning for discharge  - Provide patient education as appropriate  Outcome: Progressing  Goal: Maintain or return mobility status to optimal level  Description: INTERVENTIONS:  - Assess patient's baseline mobility status (ambulation, transfers, stairs, etc.)    - Identify cognitive and physical deficits and behaviors that affect mobility  - Identify mobility aids required to assist with transfers and/or ambulation (gait belt, sit-to-stand, lift, walker, cane, etc.)  - [place] fall precautions as indicated by assessment  - Record patient progress and toleration of activity level on Mobility SBAR; progress patient to next Phase/Stage  - Instruct patient to call for assistance with activity based on assessment  - Consider rehabilitation consult to assist with strengthening/weightbearing, etc.  Outcome: Progressing     Problem: DISCHARGE PLANNING  Goal: Discharge to home or other facility with appropriate resources  Description: INTERVENTIONS:  - Identify barriers to discharge w/patient and caregiver  - Arrange for needed discharge resources and transportation as appropriate  - Identify discharge learning needs (meds, wound care, etc.)  - Arrange for interpretive services to assist at discharge as needed  - Refer to Case Management Department for coordinating discharge planning if the patient needs post-hospital services based on physician/advanced practitioner order or complex needs related to functional status, cognitive ability, or social support system  Outcome: Progressing     Problem: Knowledge Deficit  Goal: Patient/family/caregiver demonstrates understanding of disease process, treatment plan, medications, and discharge instructions  Description: Complete learning assessment and assess knowledge base. Interventions:  - Provide teaching at level of understanding  - Provide teaching via preferred learning methods  Outcome: Progressing     Problem: Nutrition/Hydration-ADULT  Goal: Nutrient/Hydration intake appropriate for improving, restoring or maintaining nutritional needs  Description: Monitor and assess patient's nutrition/hydration status for malnutrition. Collaborate with interdisciplinary team and initiate plan and interventions as ordered. Monitor patient's weight and dietary intake as ordered or per policy. Utilize nutrition screening tool and intervene as necessary. Determine patient's food preferences and provide high-protein, high-caloric foods as appropriate.      INTERVENTIONS:  - Monitor oral intake, urinary output, labs, and treatment plans  - Assess nutrition and hydration status and recommend course of action  - Evaluate amount of meals eaten  - Assist patient with eating if necessary   - Allow adequate time for meals  - Recommend/ encourage appropriate diets, oral nutritional supplements, and vitamin/mineral supplements  - Order, calculate, and assess calorie counts as needed  - Recommend, monitor, and adjust tube feedings and TPN/PPN based on assessed needs  - Assess need for intravenous fluids  - Provide specific nutrition/hydration education as appropriate  - Include patient/family/caregiver in decisions related to nutrition  Outcome: Progressing

## 2020-10-17 PROBLEM — E87.0 HYPERNATREMIA: Status: ACTIVE | Noted: 2020-10-17

## 2020-10-17 PROBLEM — Z01.818 PRE-OPERATIVE CLEARANCE: Status: ACTIVE | Noted: 2020-10-17

## 2020-10-17 LAB
ANION GAP SERPL CALCULATED.3IONS-SCNC: 10 MMOL/L (ref 4–13)
BASOPHILS # BLD AUTO: 0.04 THOUSANDS/ÂΜL (ref 0–0.1)
BASOPHILS NFR BLD AUTO: 0 % (ref 0–1)
BUN SERPL-MCNC: 42 MG/DL (ref 5–25)
CALCIUM SERPL-MCNC: 9 MG/DL (ref 8.3–10.1)
CHLORIDE SERPL-SCNC: 112 MMOL/L (ref 100–108)
CO2 SERPL-SCNC: 26 MMOL/L (ref 21–32)
CREAT SERPL-MCNC: 1.61 MG/DL (ref 0.6–1.3)
EOSINOPHIL # BLD AUTO: 0.32 THOUSAND/ÂΜL (ref 0–0.61)
EOSINOPHIL NFR BLD AUTO: 3 % (ref 0–6)
ERYTHROCYTE [DISTWIDTH] IN BLOOD BY AUTOMATED COUNT: 11.9 % (ref 11.6–15.1)
GFR SERPL CREATININE-BSD FRML MDRD: 42 ML/MIN/1.73SQ M
GLUCOSE SERPL-MCNC: 158 MG/DL (ref 65–140)
GLUCOSE SERPL-MCNC: 170 MG/DL (ref 65–140)
GLUCOSE SERPL-MCNC: 179 MG/DL (ref 65–140)
GLUCOSE SERPL-MCNC: 183 MG/DL (ref 65–140)
GLUCOSE SERPL-MCNC: 300 MG/DL (ref 65–140)
HCT VFR BLD AUTO: 27.7 % (ref 36.5–49.3)
HGB BLD-MCNC: 8.2 G/DL (ref 12–17)
IMM GRANULOCYTES # BLD AUTO: 0.07 THOUSAND/UL (ref 0–0.2)
IMM GRANULOCYTES NFR BLD AUTO: 1 % (ref 0–2)
LYMPHOCYTES # BLD AUTO: 2.15 THOUSANDS/ÂΜL (ref 0.6–4.47)
LYMPHOCYTES NFR BLD AUTO: 17 % (ref 14–44)
MAGNESIUM SERPL-MCNC: 1.7 MG/DL (ref 1.6–2.6)
MCH RBC QN AUTO: 29.2 PG (ref 26.8–34.3)
MCHC RBC AUTO-ENTMCNC: 29.6 G/DL (ref 31.4–37.4)
MCV RBC AUTO: 99 FL (ref 82–98)
MONOCYTES # BLD AUTO: 0.59 THOUSAND/ÂΜL (ref 0.17–1.22)
MONOCYTES NFR BLD AUTO: 5 % (ref 4–12)
NEUTROPHILS # BLD AUTO: 9.59 THOUSANDS/ÂΜL (ref 1.85–7.62)
NEUTS SEG NFR BLD AUTO: 74 % (ref 43–75)
NRBC BLD AUTO-RTO: 0 /100 WBCS
PLATELET # BLD AUTO: 242 THOUSANDS/UL (ref 149–390)
PMV BLD AUTO: 11.2 FL (ref 8.9–12.7)
POTASSIUM SERPL-SCNC: 4.1 MMOL/L (ref 3.5–5.3)
PROCALCITONIN SERPL-MCNC: 0.18 NG/ML
RBC # BLD AUTO: 2.81 MILLION/UL (ref 3.88–5.62)
SODIUM SERPL-SCNC: 148 MMOL/L (ref 136–145)
WBC # BLD AUTO: 12.76 THOUSAND/UL (ref 4.31–10.16)

## 2020-10-17 PROCEDURE — 80048 BASIC METABOLIC PNL TOTAL CA: CPT | Performed by: INTERNAL MEDICINE

## 2020-10-17 PROCEDURE — 85025 COMPLETE CBC W/AUTO DIFF WBC: CPT | Performed by: INTERNAL MEDICINE

## 2020-10-17 PROCEDURE — 84145 PROCALCITONIN (PCT): CPT | Performed by: EMERGENCY MEDICINE

## 2020-10-17 PROCEDURE — 99233 SBSQ HOSP IP/OBS HIGH 50: CPT | Performed by: NURSE PRACTITIONER

## 2020-10-17 PROCEDURE — 99232 SBSQ HOSP IP/OBS MODERATE 35: CPT | Performed by: INTERNAL MEDICINE

## 2020-10-17 PROCEDURE — 82948 REAGENT STRIP/BLOOD GLUCOSE: CPT

## 2020-10-17 PROCEDURE — 83735 ASSAY OF MAGNESIUM: CPT | Performed by: NURSE PRACTITIONER

## 2020-10-17 RX ORDER — DEXTROSE AND SODIUM CHLORIDE 5; .2 G/100ML; G/100ML
50 INJECTION, SOLUTION INTRAVENOUS CONTINUOUS
Status: DISCONTINUED | OUTPATIENT
Start: 2020-10-17 | End: 2020-10-18

## 2020-10-17 RX ORDER — AZITHROMYCIN 250 MG/1
500 TABLET, FILM COATED ORAL EVERY 24 HOURS
Status: DISCONTINUED | OUTPATIENT
Start: 2020-10-18 | End: 2020-10-18

## 2020-10-17 RX ADMIN — CARBIDOPA AND LEVODOPA 1 TABLET: 25; 100 TABLET ORAL at 22:51

## 2020-10-17 RX ADMIN — AZITHROMYCIN MONOHYDRATE 500 MG: 500 INJECTION, POWDER, LYOPHILIZED, FOR SOLUTION INTRAVENOUS at 06:25

## 2020-10-17 RX ADMIN — INSULIN LISPRO 1 UNITS: 100 INJECTION, SOLUTION INTRAVENOUS; SUBCUTANEOUS at 11:45

## 2020-10-17 RX ADMIN — HEPARIN SODIUM 5000 UNITS: 5000 INJECTION INTRAVENOUS; SUBCUTANEOUS at 06:21

## 2020-10-17 RX ADMIN — INSULIN LISPRO 4 UNITS: 100 INJECTION, SOLUTION INTRAVENOUS; SUBCUTANEOUS at 16:18

## 2020-10-17 RX ADMIN — TRAZODONE HYDROCHLORIDE 150 MG: 100 TABLET ORAL at 22:51

## 2020-10-17 RX ADMIN — METRONIDAZOLE 500 MG: 500 INJECTION, SOLUTION INTRAVENOUS at 23:52

## 2020-10-17 RX ADMIN — CEFTRIAXONE 1000 MG: 1 INJECTION, POWDER, FOR SOLUTION INTRAMUSCULAR; INTRAVENOUS at 22:48

## 2020-10-17 RX ADMIN — HEPARIN SODIUM 5000 UNITS: 5000 INJECTION INTRAVENOUS; SUBCUTANEOUS at 13:45

## 2020-10-17 RX ADMIN — DEXTROSE AND SODIUM CHLORIDE 100 ML/HR: 5; .2 INJECTION, SOLUTION INTRAVENOUS at 10:36

## 2020-10-17 RX ADMIN — DEXTROSE AND SODIUM CHLORIDE 100 ML/HR: 5; .2 INJECTION, SOLUTION INTRAVENOUS at 22:48

## 2020-10-17 RX ADMIN — METRONIDAZOLE 500 MG: 500 INJECTION, SOLUTION INTRAVENOUS at 10:39

## 2020-10-17 RX ADMIN — METRONIDAZOLE 500 MG: 500 INJECTION, SOLUTION INTRAVENOUS at 14:13

## 2020-10-17 RX ADMIN — HEPARIN SODIUM 5000 UNITS: 5000 INJECTION INTRAVENOUS; SUBCUTANEOUS at 22:51

## 2020-10-17 RX ADMIN — INSULIN LISPRO 1 UNITS: 100 INJECTION, SOLUTION INTRAVENOUS; SUBCUTANEOUS at 22:51

## 2020-10-17 NOTE — ASSESSMENT & PLAN NOTE
· Transaminitis unclear etiology.   Recheck in a.m. with hepatitis panel  · Check CK    Results from last 7 days   Lab Units 10/16/20  0502 10/15/20  2100   AST U/L 89* 130*   ALT U/L 150* 133*   TOTAL BILIRUBIN mg/dL 0.20 0.24     · Trending down  · CMP in am

## 2020-10-17 NOTE — ASSESSMENT & PLAN NOTE
· DREW likely secondary to poor intake with lisinopril use  · Hold lisinopril. · Hold metformin. · Continue to trend.   Appears back to baseline   · Nephrology following    Results from last 7 days   Lab Units 10/17/20  0728 10/16/20  0502 10/15/20  2100   BUN mg/dL 42* 68* 70*   CREATININE mg/dL 1.61* 2.40* 2.86*

## 2020-10-17 NOTE — PROGRESS NOTES
Progress Note - Dereje Barnesnilson 1950, 69 y.o. male MRN: [id number]    Unit/Bed#: E2 -01 Encounter: [id number]    Primary Care Provider: Darek Carr MD   Date and time admitted to hospital: 10/15/2020  8:46 PM        * Sepsis due to pneumonia [facility])  Assessment & Plan  · Sepsis secondary to pneumonia +/-UTI  · Cannot rule out aspiration pneumonia. Continue ceftriaxone/metronidazole but add azithromycin for atypical coverage  · Modified diet to mechanical soft with nectar until seen by speech pathologist.  Amy Last consult. · Follow-up on blood - no growth at 24 hours  · Urine culture- pending    Results from last 7 days   Lab Units 10/15/20  2134   SARS-COV-2  Negative     Results from last 7 days   Lab Units 10/16/20  0502 10/15/20  2300 10/15/20  2100   LACTIC ACID mmol/L 1.4 3.3* 3.4*       Transaminitis  Assessment & Plan  · Transaminitis unclear etiology. Recheck in a.m. with hepatitis panel  · Check CK    Results from last 7 days   Lab Units 10/16/20  0502 10/15/20  2100   AST U/L 89* 130*   ALT U/L 150* 133*   TOTAL BILIRUBIN mg/dL 0.20 0.24     · Trending down  · CMP in am     Hypernatremia  Assessment & Plan  · Nephrology following  · Na 148 today  · Changed IVF this am   · Monitor blood sugars on IVF     DREW (acute kidney injury) ([address])  Assessment & Plan  · DREW likely secondary to poor intake with lisinopril use  · Hold lisinopril. · Hold metformin. · Continue to trend. Appears back to baseline   · Nephrology following    Results from last 7 days   Lab Units 10/17/20  0728 10/16/20  0502 10/15/20  2100   BUN mg/dL 42* 68* 70*   CREATININE mg/dL 1.61* 2.40* 2.86*       Parkinson disease ([address])  Assessment & Plan  · Parkinson's dementia not much verbal at baseline and essentially bed/wheelchair bound  · Continue sinemet 25/100 t.i.d.     Hypertension  Assessment & Plan  · Lisinopril on hold given DREW  · Nephrology recommending NOT restarting on discharge   · BP stable    Constipation  Assessment & Plan  · Severe constipation with fecal impaction. · Lactulose and b.i.d. miralax    Diabetes mellitus ([address])  Assessment & Plan  Lab Results   Component Value Date    HGBA1C 7.3 (H) 2020     Results from last 7 days   Lab Units 10/17/20  0728   GLUCOSE RANDOM mg/dL 170*     · Diabetes mellitus with hyperglycemia. Hold metformin given kidney injury. Of note it was reduced to 500 mg b.i.d.  by PCP due to advancing kidney disease  · Add sliding scale insulin during hospitalization  · Patient     Hyperlipidemia  Assessment & Plan  · Hyperlipidemia. · Substitute crestor with atorvastatin based on hospital formulary    GERD (gastroesophageal reflux disease)  Assessment & Plan  · GERD continue PPI      VTE Pharmacologic Prophylaxis:   Pharmacologic: Heparin  Mechanical VTE Prophylaxis in Place: Yes    Patient Centered Rounds: I have performed bedside rounds with nursing staff today. Discussions with Specialists or Other Care Team Provider: nursing    Education and Discussions with Family / Patient: patient with interpretor     Time Spent for Care: 20 minutes. More than 50% of total time spent on counseling and coordination of care as described above. Current Length of Stay: 2 day(s)    Current Patient Status: Inpatient   Certification Statement: The patient will continue to require additional inpatient hospital stay due to pneumonia infection requriing antibiotics and lab monitoring    Discharge Plan: pending clinical improvement     Code Status: Level 1 - Full Code      Subjective:   Patient in bed resting comfortably.  assisted with visit. Patient states he has no trouble breathing or cough when asked. Does not appear alert to person place and time when questioned. Patient appears in no distress. Refused assessment.       Objective:      Vitals:   Temp (24hrs), Av.2 °F (36.8 °C), Min:97.8 °F (36.6 °C), Max:98.9 °F (37.2 °C)    Temp:  [97.8 °F (36.6 °C)-98.9 °F (37.2 °C)] 98.9 °F (37.2 °C)  HR:  [75-90] 79  Resp:  [16-18] 16  BP: (133-148)/(67-78) 148/67  SpO2:  [92 %-98 %] 97 %  Body mass index is 20.75 kg/m². Input and Output Summary (last 24 hours): Intake/Output Summary (Last 24 hours) at 10/17/2020 1225  Last data filed at 10/17/2020 1045  Gross per 24 hour   Intake 2404.67 ml   Output 500 ml   Net 1904.67 ml       Physical Exam:   Patient refused PE.  used and patient refused assessment    Physical Exam  Constitutional:       Appearance: Normal appearance. Neck:      Musculoskeletal: Normal range of motion and neck supple. Pulmonary:      Effort: Pulmonary effort is normal. No respiratory distress. Musculoskeletal: Normal range of motion. Neurological:      Mental Status: He is alert. He is disoriented. Psychiatric:         Mood and Affect: Affect is labile. Speech: Speech is delayed. Cognition and Memory: Cognition is impaired. Memory is impaired. He exhibits impaired recent memory and impaired remote memory.            Additional Data:     Labs:    Results from last 7 days   Lab Units 10/17/20  0728   WBC Thousand/uL 12.76*   HEMOGLOBIN g/dL 8.2*   HEMATOCRIT % 27.7*   PLATELETS Thousands/uL 242   NEUTROS PCT % 74   LYMPHS PCT % 17   MONOS PCT % 5   EOS PCT % 3     Results from last 7 days   Lab Units 10/17/20  0728 10/16/20  0502   SODIUM mmol/L 148* 146*   POTASSIUM mmol/L 4.1 5.0   CHLORIDE mmol/L 112* 114*   CO2 mmol/L 26 19*   BUN mg/dL 42* 68*   CREATININE mg/dL 1.61* 2.40*   ANION GAP mmol/L 10 13   CALCIUM mg/dL 9.0 9.2   ALBUMIN g/dL  --  2.5*   TOTAL BILIRUBIN mg/dL  --  0.20   ALK PHOS U/L  --  106   ALT U/L  --  150*   AST U/L  --  89*   GLUCOSE RANDOM mg/dL 170* 227*     Results from last 7 days   Lab Units 10/15/20  2100   INR  1.22*     Results from last 7 days   Lab Units 10/17/20  1124 10/17/20  0725 10/16/20  2053 10/16/20  1543 10/16/20  1125 10/16/20  0638   POC GLUCOSE mg/dl 179* 158* 168* 218* 253* 244*         Results from last 7 days   Lab Units 10/16/20  0502 10/15/20  2300 10/15/20  2100   LACTIC ACID mmol/L 1.4 3.3* 3.4*   PROCALCITONIN ng/ml 0.43*  --  0.53*           * I Have Reviewed All Lab Data Listed Above. * Additional Pertinent Lab Tests Reviewed: [address] Admission Reviewed    Imaging:    Imaging Reports Reviewed Today Include: none  Imaging Personally Reviewed by Myself Includes:  none    Recent Cultures (last 7 days):     Results from last 7 days   Lab Units 10/16/20  0435 10/15/20  2302 10/15/20  2105 10/15/20  2055   BLOOD CULTURE   --   --  No Growth at 24 hrs. No Growth at 24 hrs. URINE CULTURE   --  Culture results to follow.   --   --    LEGIONELLA URINARY ANTIGEN  Negative  --   --   --        Last 24 Hours Medication List:   Current Facility-Administered Medications   Medication Dose Route Frequency Provider Last Rate   • acetaminophen  650 mg Oral Q6H PRN Nataliia Embarrass, DO     • atorvastatin  40 mg Oral Daily With Creabilis, DO     • [START ON 10/18/2020] azithromycin  500 mg Oral Q24H Kymberlyanupama Chino, DO     • carbidopa-levodopa  1 tablet Oral TID Nataliia Embarrass, DO     • cefTRIAXone  1,000 mg Intravenous Q24H Nataliia Embarrass, DO Stopped (10/16/20 2213)   • dextrose 5 % and sodium chloride 0.2 %  100 mL/hr Intravenous Continuous Kesha BoronSID higginbotham Stopped (10/17/20 1039)   • heparin (porcine)  5,000 Units Subcutaneous [facility] Young Medel, DO     • insulin lispro  1-6 Units Subcutaneous 4x Daily (AC & HS) Nataliia Embarrass, DO     • lactulose  20 g Oral TID Nataliia Embarrass, DO     • levalbuterol  1.25 mg Nebulization Q6H PRN Nataliia Embarrass, DO     • metroNIDAZOLE  500 mg Intravenous Q8H Young Medel,  mg (10/17/20 1039)   • ondansetron  4 mg Intravenous Q6H PRN Nataliia Embarrass, DO     • pantoprazole  40 mg Oral Early Morning Young Medel, DO     • polyethylene glycol  17 g Oral BID Nataliia Rader DO     • traZODone  150 mg Oral HS Nataliia Rader, DO          Today, Patient Was Seen By: SID Clayton    ** Please Note: Dictation voice to text software may have been used in the creation of this document.  **

## 2020-10-17 NOTE — ASSESSMENT & PLAN NOTE
Lab Results   Component Value Date    HGBA1C 7.3 (H) 08/26/2020     Results from last 7 days   Lab Units 10/17/20  0728   GLUCOSE RANDOM mg/dL 170*     · Diabetes mellitus with hyperglycemia. Hold metformin given kidney injury.   Of note it was reduced to 500 mg b.i.d.  by PCP due to advancing kidney disease  · Add sliding scale insulin during hospitalization  · Patient

## 2020-10-17 NOTE — ASSESSMENT & PLAN NOTE
· Lisinopril on hold given DREW  · Nephrology recommending NOT restarting on discharge   · BP stable

## 2020-10-17 NOTE — WOUND OSTOMY CARE
Consult Note - Wound   Wilhemina Side Dejesusmorel 69 y.o. male MRN: [id number]  Unit/Bed#: E2 -01 Encounter: [id number]      History and Present Illness:  69year old male presented to the hospital with low oxygen saturations at home. Patient's history significant for dementia/Parkinson's disease. Patient is Urdu speaking and non-verbal per primary RN. Explained plan of assessment to patient using [employer]  system. Assessment Findings:   Patient assessed while in bed. Requires assist x 1 to turn/reposition. He is frequently incontinent of stool per nursing. His lower extremities are contracted with knees rubbing together--preventative foam dressing and pillow placed here to offload pressure. Bilateral heels and buttocks are intact. There is intact hypo and hyperpigmented scarring to right hip. Scattered hyperpigmented scars to sacrum and lower back. 1.  Present on admission unstageable pressure injury to left hip--pink, with three areas of yellow slough and non-blanchable hyperpigmentation. No induration or fluctuance. 2.  Present on admission small area of dry, yellow eschar to sacrum--nursing captured as an unstageable pressure injury which is possible given patient's odd positioning. However, it appears to be more of a small abscess. There is no dana-wound induration or fluctuance. No drainage. Healing. See flowsheet and media for wound details. Wound Care Plan:   1-Apply Allevyn Life foam dressing to midline bilateral heels, rigtht hip scar tissue, and between knees for prevention. Moy with P.  Peel back at least daily for skin assessment and re-apply. Change dressing every 3 days and PRN. 2-Elevate heels off of bed surface with wedges, pillows or prevalon boots to offload pressure. 3-Hydraguard lotion to bilateral buttocks and sacrum three times daily and PRN with incontinence care.   4-Moisturize skin daily with skin nourishing cream.  5-Turn/reposition q2h or when medically stable for pressure re-distribution on skin--use positioning wedges. 6-P500 low air-loss mattress. 7-Left hip--cleanse with soap and water, pat dry. Apply Allevyn Life foam dressing. Moy with T.  Change dressing every 3 days and PRN. Wound care team to follow. Plan of care reviewed with primary RN. Patient would benefit from VNA for wound care on discharge. Wound 10/16/20 Pressure Injury Hip Left;Lateral (Active)   Wound Image   10/16/20 1532   Wound Description Slough; Yellow 10/16/20 1532   Pressure Injury Stage U 10/16/20 1532   [place]-wound Assessment Hyperpigmented;Fragile 10/16/20 1532   Wound Length (cm) 3.6 cm 10/16/20 1532   Wound Width (cm) 6 cm 10/16/20 1532   Wound Depth (cm) 0.1 cm 10/16/20 1532   Wound Surface Area (cm^2) 21.6 cm^2 10/16/20 1532   Wound Volume (cm^3) 2.16 cm^3 10/16/20 1532   Calculated Wound Volume (cm^3) 2.16 cm^3 10/16/20 1532   Wound Site Closure Unapproximated 10/16/20 0106   Drainage Amount None 10/16/20 1532   Treatments Cleansed 10/16/20 1532   Dressing Foam, Silicon (eg. Allevyn, etc) 10/16/20 1532   Wound packed? No 10/16/20 0106   Patient Tolerance Tolerated well 10/16/20 1532   Dressing Status Clean;Dry; Intact 10/16/20 1532       Wound 10/16/20 Sacrum Medial (Active)   Wound Description Dry;Yellow 10/16/20 1532   Drainage Amount None 10/16/20 1532   Treatments Cleansed;Elevated 10/16/20 0106   Dressing Protective barrier 10/16/20 1532   Patient Tolerance Tolerated well 10/16/20 1532   Dressing Status Clean;Dry; Intact 10/16/20 [facility] BSN, RN, [facility]

## 2020-10-17 NOTE — ASSESSMENT & PLAN NOTE
· Sepsis secondary to pneumonia +/-UTI  · Cannot rule out aspiration pneumonia. Continue ceftriaxone/metronidazole but add azithromycin for atypical coverage  · Modified diet to mechanical soft with nectar until seen by speech pathologist.  Nicol Reynoso consult.     · Follow-up on blood - no growth at 24 hours  · Urine culture- pending    Results from last 7 days   Lab Units 10/15/20  2134   SARS-COV-2  Negative     Results from last 7 days   Lab Units 10/16/20  0502 10/15/20  2300 10/15/20  2100   LACTIC ACID mmol/L 1.4 3.3* 3.4*

## 2020-10-17 NOTE — PLAN OF CARE
Problem: Potential for Falls  Goal: Patient will remain free of falls  Description: INTERVENTIONS:  - Assess patient frequently for physical needs  -  Identify cognitive and physical deficits and behaviors that affect risk of falls.   -  [place] fall precautions as indicated by assessment.  - Educate patient/family on patient safety including physical limitations  - Instruct patient to call for assistance with activity based on assessment  - Modify environment to reduce risk of injury  - Consider OT/PT consult to assist with strengthening/mobility  Outcome: Progressing     Problem: Prexisting or High Potential for Compromised Skin Integrity  Goal: Skin integrity is maintained or improved  Description: INTERVENTIONS:  - Identify patients at risk for skin breakdown  - Assess and monitor skin integrity  - Assess and monitor nutrition and hydration status  - Monitor labs   - Assess for incontinence   - Turn and reposition patient  - Assist with mobility/ambulation  - Relieve pressure over bony prominences  - Avoid friction and shearing  - Provide appropriate hygiene as needed including keeping skin clean and dry  - Evaluate need for skin moisturizer/barrier cream  - Collaborate with interdisciplinary team   - Patient/family teaching  - Consider wound care consult   Outcome: Progressing     Problem: DISCHARGE PLANNING - CARE MANAGEMENT  Goal: Discharge to post-acute care or home with appropriate resources  Description: INTERVENTIONS:  - Conduct assessment to determine patient/family and health care team treatment goals, and need for post-acute services based on payer coverage, community resources, and patient preferences, and barriers to discharge  - Address psychosocial, clinical, and financial barriers to discharge as identified in assessment in conjunction with the patient/family and health care team  - Arrange appropriate level of post-acute services according to patient's   needs and preference and payer coverage in collaboration with the physician and health care team  - Communicate with and update the patient/family, physician, and health care team regarding progress on the discharge plan  - Arrange appropriate transportation to post-acute venues  Outcome: Progressing     Problem: INFECTION - ADULT  Goal: Absence or prevention of progression during hospitalization  Description: INTERVENTIONS:  - Assess and monitor for signs and symptoms of infection  - Monitor lab/diagnostic results  - Monitor all insertion sites, i.e. indwelling lines, tubes, and drains  - Monitor endotracheal if appropriate and nasal secretions for changes in amount and color  - [place] appropriate cooling/warming therapies per order  - Administer medications as ordered  - Instruct and encourage patient and family to use good hand hygiene technique  - Identify and instruct in appropriate isolation precautions for identified infection/condition  Outcome: Progressing  Goal: Absence of fever/infection during neutropenic period  Description: INTERVENTIONS:  - Monitor WBC    Outcome: Progressing     Problem: SAFETY ADULT  Goal: Patient will remain free of falls  Description: INTERVENTIONS:  - Assess patient frequently for physical needs  -  Identify cognitive and physical deficits and behaviors that affect risk of falls.   -  [place] fall precautions as indicated by assessment.  - Educate patient/family on patient safety including physical limitations  - Instruct patient to call for assistance with activity based on assessment  - Modify environment to reduce risk of injury  - Consider OT/PT consult to assist with strengthening/mobility  Outcome: Progressing  Goal: Maintain or return to baseline ADL function  Description: INTERVENTIONS:  -  Assess patient's ability to carry out ADLs; assess patient's baseline for ADL function and identify physical deficits which impact ability to perform ADLs (bathing, care of mouth/teeth, toileting, grooming, dressing, etc.)  - Assess/evaluate cause of self-care deficits   - Assess range of motion  - Assess patient's mobility; develop plan if impaired  - Assess patient's need for assistive devices and provide as appropriate  - Encourage maximum independence but intervene and supervise when necessary  - Involve family in performance of ADLs  - Assess for home care needs following discharge   - Consider OT consult to assist with ADL evaluation and planning for discharge  - Provide patient education as appropriate  Outcome: Progressing  Goal: Maintain or return mobility status to optimal level  Description: INTERVENTIONS:  - Assess patient's baseline mobility status (ambulation, transfers, stairs, etc.)    - Identify cognitive and physical deficits and behaviors that affect mobility  - Identify mobility aids required to assist with transfers and/or ambulation (gait belt, sit-to-stand, lift, walker, cane, etc.)  - [place] fall precautions as indicated by assessment  - Record patient progress and toleration of activity level on Mobility SBAR; progress patient to next Phase/Stage  - Instruct patient to call for assistance with activity based on assessment  - Consider rehabilitation consult to assist with strengthening/weightbearing, etc.  Outcome: Progressing     Problem: DISCHARGE PLANNING  Goal: Discharge to home or other facility with appropriate resources  Description: INTERVENTIONS:  - Identify barriers to discharge w/patient and caregiver  - Arrange for needed discharge resources and transportation as appropriate  - Identify discharge learning needs (meds, wound care, etc.)  - Arrange for interpretive services to assist at discharge as needed  - Refer to Case Management Department for coordinating discharge planning if the patient needs post-hospital services based on physician/advanced practitioner order or complex needs related to functional status, cognitive ability, or social support system  Outcome: Progressing     Problem: Knowledge Deficit  Goal: Patient/family/caregiver demonstrates understanding of disease process, treatment plan, medications, and discharge instructions  Description: Complete learning assessment and assess knowledge base. Interventions:  - Provide teaching at level of understanding  - Provide teaching via preferred learning methods  Outcome: Progressing     Problem: Nutrition/Hydration-ADULT  Goal: Nutrient/Hydration intake appropriate for improving, restoring or maintaining nutritional needs  Description: Monitor and assess patient's nutrition/hydration status for malnutrition. Collaborate with interdisciplinary team and initiate plan and interventions as ordered. Monitor patient's weight and dietary intake as ordered or per policy. Utilize nutrition screening tool and intervene as necessary. Determine patient's food preferences and provide high-protein, high-caloric foods as appropriate.      INTERVENTIONS:  - Monitor oral intake, urinary output, labs, and treatment plans  - Assess nutrition and hydration status and recommend course of action  - Evaluate amount of meals eaten  - Assist patient with eating if necessary   - Allow adequate time for meals  - Recommend/ encourage appropriate diets, oral nutritional supplements, and vitamin/mineral supplements  - Order, calculate, and assess calorie counts as needed  - Recommend, monitor, and adjust tube feedings and TPN/PPN based on assessed needs  - Assess need for intravenous fluids  - Provide specific nutrition/hydration education as appropriate  - Include patient/family/caregiver in decisions related to nutrition  Outcome: Progressing     Problem: GASTROINTESTINAL - ADULT  Goal: Maintains or returns to baseline bowel function  Description: INTERVENTIONS:  - Assess bowel function  - Encourage oral fluids to ensure adequate hydration  - Administer IV fluids if ordered to ensure adequate hydration  - Administer ordered medications as needed  - Encourage mobilization and activity  - Consider nutritional services referral to assist patient with adequate nutrition and appropriate food choices  Outcome: Progressing     Problem: SKIN/TISSUE INTEGRITY - ADULT  Goal: Skin integrity remains intact  Description: INTERVENTIONS  - Identify patients at risk for skin breakdown  - Assess and monitor skin integrity  - Assess and monitor nutrition and hydration status  - Monitor labs (i.e. albumin)  - Assess for incontinence   - Turn and reposition patient  - Assist with mobility/ambulation  - Relieve pressure over bony prominences  - Avoid friction and shearing  - Provide appropriate hygiene as needed including keeping skin clean and dry  - Evaluate need for skin moisturizer/barrier cream  - Collaborate with interdisciplinary team (i.e. Nutrition, Rehabilitation, etc.)   - Patient/family teaching  Outcome: Progressing     Problem: GENITOURINARY - ADULT  Goal: Urinary catheter remains patent  Description: INTERVENTIONS:  - Assess patency of condom catheter     Outcome: Progressing

## 2020-10-17 NOTE — DISCHARGE INSTR - OTHER ORDERS
Wound Care Plan:   1-Apply Hydraguard lotion to bilateral buttocks, sacrum, heels, rigtht hip scar tissue, and between knees for prevention and skin protection. 2-Elevate heels off of bed surface with wedges, pillows or prevalon boots to offload pressure. 3-Moisturize skin daily with skin nourishing lotion. 4-Turn/reposition every 2 hours for pressure re-distribution on skin--use positioning wedges. Place pillow or foam dressing between knees to prevent knees from rubbing together. 6-Left hip--cleanse with soap and water, pat dry. Apply Allevyn Life foam dressing. Change dressing every 3 days and as needed.

## 2020-10-17 NOTE — PROGRESS NOTES
[address] NOTE   Eliezerdario Huitron Marianneorel 69 y.o. male MRN: [id number]  Unit/Bed#: E2 -01 Encounter: [id number]  Reason for Consult:  Acute kidney injury, hypernatremia    ASSESSMENT and PLAN:  69-year-old Latvian-speaking gentleman who presented with decreasing functional status and hypoxia. He has a history of Alzheimer's dementia, Parkinson's disease, CAD, CHF, diabetes, GERD, hypertension, CKD stage 3. At baseline nonambulatory and minimally verbal/nonverbal.  Nephrology consulted for management of acute kidney injury on CKD. 1. Acute acute kidney injury:  Resolved  · Etiology likely pre renal azotemia, possibly a component of ATN given dehydration in the setting of altered renal hemodynamics due to ACE-inhibitor  · Renal function improving with IV fluids, holding ACE-inhibitor  · Creatinine 2.84 on admission improving can currently down to 1.61  · Plan/recommendations:  · Continue IV fluids due to hypernatremia  · Check labs in the a.m. 2. Chronic kidney disease, stage III:    · Baseline creatinine appears to be between 1.4-1.9 mg/dL. · Etiology likely hypertensive nephrosclerosis, diabetic nephropathy. 3. Hypernatremia:    · Secondary to decreased free water intake/dysphagia  · On hypotonic fluids:  sterile water with 50 mEq of sodium bicarbonate  · Sodium level 148, increasing  · Plan:  · Switch fluids to D5 quarter normal saline to provide free water at 100 mL/hour. · Water deficit calculated is 1.7 L  · Will provide additional fluids due to insensible loss/GI loss  4. Mild metabolic acidosis:    · Multifactorial:  Acute kidney injury, sepsis, ?bicarbonate losses via stool-on lactulose  · On bicarbonate infusion  · Bicarbonate level down to 19 on 10/16. Now up to 26  · No indication for further bicarbonate infusion  5. Hypertension:  Permissive elevation of blood pressure at this time  6. Sepsis:  Multifactorial with pneumonia, possibly UTI possible wound infection. On Rocephin, azithromycin, Flagyl  7. Anemia: hemoglobin down to 8.2. Likely dilutional decline  8. Diabetes mellitus:  Management per primary team on sliding scale insulin    DISPOSITION:  Discontinue bicarbonate containing IV fluids. Continue hypotonic fluids. Check labs in the a.m. SUBJECTIVE / INTERVAL HISTORY:  Nonverbal.  Patient is alert and tracking. OBJECTIVE:  Current Weight: Weight - Scale: 60.1 kg (132 lb 7.9 oz)  Vitals:    10/16/20 0924 10/16/20 1434 10/16/20 2341 10/17/20 0600   BP:  133/77 147/78    BP Location:   Right arm    Pulse:  75 90    Resp:  18 18    Temp:  97.8 °F (36.6 °C) 97.8 °F (36.6 °C)    TempSrc:   Temporal    SpO2:  98% 92%    Weight: 59.6 kg (131 lb 6.3 oz)   60.1 kg (132 lb 7.9 oz)       Intake/Output Summary (Last 24 hours) at 10/17/2020 0715  Last data filed at 10/17/2020 0024  Gross per 24 hour   Intake 1161.67 ml   Output 500 ml   Net 661.67 ml     General: NAD, comfortably lying in bed  Skin: no rash  Eyes: anicteric sclera  ENT: moist mucous membrane  Neck: supple  Chest: CTA b/l, no ronchii, no wheeze, no rubs, no rales. Normal effort  CVS: s1s2, no murmur, no gallop, no rub  Abdomen: soft, nontender, nl sounds  Extremities: no edema LE b/l.   Muscle wasting  Neuro:  Alert  Psych: normal affect  Medications:    Current Facility-Administered Medications:   •  acetaminophen (TYLENOL) tablet 650 mg, 650 mg, Oral, Q6H PRN, Viviana Wen DO  •  atorvastatin (LIPITOR) tablet 40 mg, 40 mg, Oral, Daily With Dinner, Young Medel DO, 40 mg at 10/16/20 1720  •  azithromycin (ZITHROMAX) 500 mg in sodium chloride 0.9 % 250 mL IVPB, 500 mg, Intravenous, Q24H, SID Bojorquez, Last Rate: 250 mL/hr at 10/17/20 0625, 500 mg at 10/17/20 7802  •  carbidopa-levodopa (SINEMET)  mg per tablet 1 tablet, 1 tablet, Oral, TID, Viviana Wen DO, 1 tablet at 10/16/20 2142  •  cefTRIAXone (ROCEPHIN) 1,000 mg in dextrose 5 % 50 mL IVPB, 1,000 mg, Intravenous, Q24H, Stopped at 10/16/20 2213 **AND** [DISCONTINUED] azithromycin (ZITHROMAX) tablet 500 mg, 500 mg, Oral, Q24H, Young Medel DO  •  heparin (porcine) subcutaneous injection 5,000 Units, 5,000 Units, Subcutaneous, Q8H [facility], Young Medel DO, 5,000 Units at 10/17/20 0621  •  insulin lispro (HumaLOG) 100 units/mL subcutaneous injection 1-6 Units, 1-6 Units, Subcutaneous, 4x Daily (AC & HS), 1 Units at 10/16/20 2143 **AND** Fingerstick Glucose (POCT), , , 4x Daily AC and at bedtime, Hayes Argueta DO  •  lactulose oral solution 20 g, 20 g, Oral, TID, Young Medel, DO, 20 g at 10/16/20 2142  •  levalbuterol (XOPENEX) inhalation solution 1.25 mg, 1.25 mg, Nebulization, Q6H PRN, Hayes Argueta DO  •  metroNIDAZOLE (FLAGYL) IVPB (premix) 500 mg 100 mL, 500 mg, Intravenous, Q8H, Young Medel DO, Stopped at 10/17/20 0024  •  ondansetron (ZOFRAN) injection 4 mg, 4 mg, Intravenous, Q6H PRN, Hayes Argueta DO  •  pantoprazole (PROTONIX) EC tablet 40 mg, 40 mg, Oral, Early [place], Young Medel DO  •  polyethylene glycol (MIRALAX) packet 17 g, 17 g, Oral, BID, Young Medel DO, 17 g at 10/16/20 2143  •  sodium bicarbonate 50 mEq in sterile water 1,000 mL infusion, , Intravenous, Continuous, SID [place], Last Rate: 100 mL/hr at 10/16/20 2354  •  traZODone (DESYREL) tablet 150 mg, 150 mg, Oral, HS, Young Medel, DO, 150 mg at 10/16/20 2142    Laboratory Results:  Results from last 7 days   Lab Units 10/16/20  0502 10/15/20  2100   WBC Thousand/uL 17.23* 17.47*   HEMOGLOBIN g/dL 9.0* 9.4*   HEMATOCRIT % 31.0* 31.8*   PLATELETS Thousands/uL 283 331   POTASSIUM mmol/L 5.0 5.1   CHLORIDE mmol/L 114* 110*   CO2 mmol/L 19* 23   BUN mg/dL 68* 70*   CREATININE mg/dL 2.40* 2.86*   CALCIUM mg/dL 9.2 9.8

## 2020-10-17 NOTE — PROGRESS NOTES
The azithromycin IV has / have been converted to Oral per [facility] IV-to-PO Auto-Conversion Protocol for Adults as approved by the Pharmacy and Therapeutics Committee. The patient met all eligible criteria:  1) Age = > 25years old   2) Received at least one dose of the IV form   3) Receiving at least one other scheduled oral/enteral medication   4) Tolerating an oral/enteral diet   and did not have any exclusions:   1) Critical care patient   2) Active GI bleed (IF assessing H2RAs or PPIs)   3) Continuous tube feeding (IF assessing cipro, doxycycline, levofloxacin, minocycline, rifampin, or voriconazole)   4) Receiving PO vancomycin (IF assessing metronidazole)   5) Persistent nausea and/or vomiting   6) Ileus or gastrointestinal obstruction   7) Shreya/nasogastric tube set for continuous suction   8) Specific order not to automatically convert to PO (in the order's comments or if discussed in the most recent Infectious Disease or primary team's progress notes).

## 2020-10-18 LAB
ALBUMIN SERPL BCP-MCNC: 2.2 G/DL (ref 3.5–5)
ALP SERPL-CCNC: 93 U/L (ref 46–116)
ALT SERPL W P-5'-P-CCNC: 87 U/L (ref 12–78)
ANION GAP SERPL CALCULATED.3IONS-SCNC: 7 MMOL/L (ref 4–13)
AST SERPL W P-5'-P-CCNC: 87 U/L (ref 5–45)
BACTERIA UR CULT: ABNORMAL
BASOPHILS # BLD AUTO: 0.05 THOUSANDS/ÂΜL (ref 0–0.1)
BASOPHILS NFR BLD AUTO: 0 % (ref 0–1)
BILIRUB SERPL-MCNC: 0.19 MG/DL (ref 0.2–1)
BUN SERPL-MCNC: 25 MG/DL (ref 5–25)
CALCIUM ALBUM COR SERPL-MCNC: 9.9 MG/DL (ref 8.3–10.1)
CALCIUM SERPL-MCNC: 8.5 MG/DL (ref 8.3–10.1)
CHLORIDE SERPL-SCNC: 105 MMOL/L (ref 100–108)
CO2 SERPL-SCNC: 24 MMOL/L (ref 21–32)
CREAT SERPL-MCNC: 1.41 MG/DL (ref 0.6–1.3)
EOSINOPHIL # BLD AUTO: 0.39 THOUSAND/ÂΜL (ref 0–0.61)
EOSINOPHIL NFR BLD AUTO: 4 % (ref 0–6)
ERYTHROCYTE [DISTWIDTH] IN BLOOD BY AUTOMATED COUNT: 11.8 % (ref 11.6–15.1)
GFR SERPL CREATININE-BSD FRML MDRD: 50 ML/MIN/1.73SQ M
GLUCOSE SERPL-MCNC: 166 MG/DL (ref 65–140)
GLUCOSE SERPL-MCNC: 171 MG/DL (ref 65–140)
GLUCOSE SERPL-MCNC: 192 MG/DL (ref 65–140)
GLUCOSE SERPL-MCNC: 220 MG/DL (ref 65–140)
GLUCOSE SERPL-MCNC: 224 MG/DL (ref 65–140)
HCT VFR BLD AUTO: 30 % (ref 36.5–49.3)
HGB BLD-MCNC: 8.7 G/DL (ref 12–17)
IMM GRANULOCYTES # BLD AUTO: 0.04 THOUSAND/UL (ref 0–0.2)
IMM GRANULOCYTES NFR BLD AUTO: 0 % (ref 0–2)
LYMPHOCYTES # BLD AUTO: 2.08 THOUSANDS/ÂΜL (ref 0.6–4.47)
LYMPHOCYTES NFR BLD AUTO: 19 % (ref 14–44)
MCH RBC QN AUTO: 28.9 PG (ref 26.8–34.3)
MCHC RBC AUTO-ENTMCNC: 29 G/DL (ref 31.4–37.4)
MCV RBC AUTO: 100 FL (ref 82–98)
MONOCYTES # BLD AUTO: 0.56 THOUSAND/ÂΜL (ref 0.17–1.22)
MONOCYTES NFR BLD AUTO: 5 % (ref 4–12)
NEUTROPHILS # BLD AUTO: 8.03 THOUSANDS/ÂΜL (ref 1.85–7.62)
NEUTS SEG NFR BLD AUTO: 72 % (ref 43–75)
NRBC BLD AUTO-RTO: 0 /100 WBCS
PLATELET # BLD AUTO: 211 THOUSANDS/UL (ref 149–390)
PMV BLD AUTO: 11.2 FL (ref 8.9–12.7)
POTASSIUM SERPL-SCNC: 3.8 MMOL/L (ref 3.5–5.3)
PROT SERPL-MCNC: 7.3 G/DL (ref 6.4–8.2)
RBC # BLD AUTO: 3.01 MILLION/UL (ref 3.88–5.62)
SODIUM SERPL-SCNC: 136 MMOL/L (ref 136–145)
WBC # BLD AUTO: 11.15 THOUSAND/UL (ref 4.31–10.16)

## 2020-10-18 PROCEDURE — 99223 1ST HOSP IP/OBS HIGH 75: CPT | Performed by: INTERNAL MEDICINE

## 2020-10-18 PROCEDURE — 99232 SBSQ HOSP IP/OBS MODERATE 35: CPT | Performed by: INTERNAL MEDICINE

## 2020-10-18 PROCEDURE — 85025 COMPLETE CBC W/AUTO DIFF WBC: CPT | Performed by: NURSE PRACTITIONER

## 2020-10-18 PROCEDURE — 82948 REAGENT STRIP/BLOOD GLUCOSE: CPT

## 2020-10-18 PROCEDURE — 87040 BLOOD CULTURE FOR BACTERIA: CPT | Performed by: INTERNAL MEDICINE

## 2020-10-18 PROCEDURE — 80053 COMPREHEN METABOLIC PANEL: CPT | Performed by: NURSE PRACTITIONER

## 2020-10-18 RX ORDER — METRONIDAZOLE 500 MG/1
500 TABLET ORAL EVERY 8 HOURS SCHEDULED
Status: DISCONTINUED | OUTPATIENT
Start: 2020-10-18 | End: 2020-10-18

## 2020-10-18 RX ORDER — INSULIN GLARGINE 100 [IU]/ML
10 INJECTION, SOLUTION SUBCUTANEOUS
Status: DISCONTINUED | OUTPATIENT
Start: 2020-10-18 | End: 2020-10-21

## 2020-10-18 RX ORDER — VANCOMYCIN HYDROCHLORIDE 1 G/200ML
15 INJECTION, SOLUTION INTRAVENOUS EVERY 24 HOURS
Status: DISCONTINUED | OUTPATIENT
Start: 2020-10-18 | End: 2020-10-18

## 2020-10-18 RX ORDER — AMOXICILLIN 250 MG
1 CAPSULE ORAL 2 TIMES DAILY
Status: DISCONTINUED | OUTPATIENT
Start: 2020-10-18 | End: 2020-10-27 | Stop reason: HOSPADM

## 2020-10-18 RX ADMIN — VANCOMYCIN HYDROCHLORIDE 1000 MG: 1 INJECTION, SOLUTION INTRAVENOUS at 09:22

## 2020-10-18 RX ADMIN — POLYETHYLENE GLYCOL 3350 17 G: 17 POWDER, FOR SOLUTION ORAL at 21:41

## 2020-10-18 RX ADMIN — INSULIN LISPRO 1 UNITS: 100 INJECTION, SOLUTION INTRAVENOUS; SUBCUTANEOUS at 16:20

## 2020-10-18 RX ADMIN — INSULIN GLARGINE 10 UNITS: 100 INJECTION, SOLUTION SUBCUTANEOUS at 21:40

## 2020-10-18 RX ADMIN — INSULIN LISPRO 1 UNITS: 100 INJECTION, SOLUTION INTRAVENOUS; SUBCUTANEOUS at 21:40

## 2020-10-18 RX ADMIN — ATORVASTATIN CALCIUM 40 MG: 40 TABLET, FILM COATED ORAL at 16:20

## 2020-10-18 RX ADMIN — HEPARIN SODIUM 5000 UNITS: 5000 INJECTION INTRAVENOUS; SUBCUTANEOUS at 13:38

## 2020-10-18 RX ADMIN — METRONIDAZOLE 500 MG: 500 INJECTION, SOLUTION INTRAVENOUS at 06:19

## 2020-10-18 RX ADMIN — CARBIDOPA AND LEVODOPA 1 TABLET: 25; 100 TABLET ORAL at 09:23

## 2020-10-18 RX ADMIN — TRAZODONE HYDROCHLORIDE 150 MG: 100 TABLET ORAL at 21:41

## 2020-10-18 RX ADMIN — INSULIN LISPRO 2 UNITS: 100 INJECTION, SOLUTION INTRAVENOUS; SUBCUTANEOUS at 09:20

## 2020-10-18 RX ADMIN — HEPARIN SODIUM 5000 UNITS: 5000 INJECTION INTRAVENOUS; SUBCUTANEOUS at 21:40

## 2020-10-18 RX ADMIN — METRONIDAZOLE 500 MG: 500 TABLET ORAL at 13:38

## 2020-10-18 RX ADMIN — INSULIN LISPRO 2 UNITS: 100 INJECTION, SOLUTION INTRAVENOUS; SUBCUTANEOUS at 12:05

## 2020-10-18 RX ADMIN — AZITHROMYCIN MONOHYDRATE 500 MG: 250 TABLET ORAL at 09:22

## 2020-10-18 RX ADMIN — CARBIDOPA AND LEVODOPA 1 TABLET: 25; 100 TABLET ORAL at 21:41

## 2020-10-18 RX ADMIN — HEPARIN SODIUM 5000 UNITS: 5000 INJECTION INTRAVENOUS; SUBCUTANEOUS at 06:18

## 2020-10-18 RX ADMIN — CARBIDOPA AND LEVODOPA 1 TABLET: 25; 100 TABLET ORAL at 16:20

## 2020-10-18 NOTE — PROGRESS NOTES
[address] NOTE   Mitcheal Buerger Marianneorel 69 y.o. male MRN: [id number]  Unit/Bed#: E2 -01 Encounter: [id number]  Reason for Consult:  Acute kidney injury on CKD    ASSESSMENT and PLAN:  34-year-old Syrian-speaking gentleman who presented with decreasing functional status and hypoxia. He has a history of Alzheimer's dementia, Parkinson's disease, CAD, CHF, diabetes, GERD, hypertension, CKD stage 3. At baseline nonambulatory and minimally verbal/nonverbal.  Nephrology consulted for management of acute kidney injury on CKD.     1. Acute acute kidney injury:  Resolved  · Etiology likely pre renal azotemia, possibly a component of ATN given dehydration in the setting of altered renal hemodynamics due to ACE-inhibitor  · Renal function improving with IV fluids, holding ACE-inhibitor  · Creatinine 2.84 on admission improving and currently down to 1.4. Creatinine continues to decline. Renal function stable  · Plan/recommendations:  ? Discontinue IV fluids and monitor  ? Monitor vancomycin level  ? Check labs in the a.m. 2. Chronic kidney disease, stage III:    · Baseline creatinine appears to be between 1.4-1.9 mg/dL. · Etiology likely hypertensive nephrosclerosis, diabetic nephropathy. 3. Hypernatremia:    · Secondary to decreased free water intake/dysphagia  · On hypotonic fluids:  sterile water with 50 mEq of sodium bicarbonate  · Sodium level 148, increasing as of 10/17. Patient placed on hypotonic fluids with rapid improvement in sodium level  · Plan:  ? Discontinue IV fluids  ? Check studies in the a.m.  4. Mild metabolic acidosis:    · Multifactorial:  Acute kidney injury, sepsis, ?bicarbonate losses via stool-on lactulose  · Improved with bicarbonate infusion  · Current bicarbonate level acceptable, 24  5. Hypertension:  Blood pressure acceptable  6. Sepsis:  Multifactorial with pneumonia, possibly UTI possible wound infection.     · Currently on Zithromax, Flagyl, vancomycin  7. Anemia:  hemoglobin low but stable  8. Diabetes mellitus:  Management per primary team on sliding scale insulin       DISPOSITION:  Hold IV fluids  Check labs in the a.m. Encourage oral intake    SUBJECTIVE / INTERVAL HISTORY:  Nonverbal.  Overnight events    OBJECTIVE:  Current Weight: Weight - Scale: 61 kg (134 lb 7.7 oz)  Vitals:    10/17/20 1542 10/17/20 2300 10/18/20 0559 10/18/20 0735   BP: 146/71 143/72  120/60   BP Location: Left arm Right arm  Right arm   Pulse: 79 65  61   Resp: 16 16  18   Temp: 97.6 °F (36.4 °C) 97.7 °F (36.5 °C)  99.2 °F (37.3 °C)   TempSrc: Temporal Temporal  Temporal   SpO2: 97% 98%  98%   Weight:   61 kg (134 lb 7.7 oz)        Intake/Output Summary (Last 24 hours) at 10/18/2020 0952  Last data filed at 10/17/2020 1746  Gross per 24 hour   Intake 1243 ml   Output 950 ml   Net 293 ml     General: NAD, at baseline. Extremely debilitated  Skin: no rash  Eyes: anicteric sclera  ENT: moist mucous membrane  Neck: supple  Chest:  Decreased breath sounds, no audible wheezes or stridor. No rhonchi. Normal effort  CVS: s1s2, no murmur, no gallop, no rub. Regular  Abdomen: soft, nontender, nl sounds  Extremities: no edema LE b/l  : no arevalo  Neuro:  No acute changes  Psych:   At baseline  Medications:    Current Facility-Administered Medications:   •  acetaminophen (TYLENOL) tablet 650 mg, 650 mg, Oral, Q6H PRN, Stephanie Boas, DO  •  atorvastatin (LIPITOR) tablet 40 mg, 40 mg, Oral, Daily With Dinner, Young Medel DO, 40 mg at 10/16/20 1720  •  azithromycin [facility]) tablet 500 mg, 500 mg, Oral, Q24H, Kavita Oates DO, 500 mg at 10/18/20 1054  •  carbidopa-levodopa (SINEMET)  mg per tablet 1 tablet, 1 tablet, Oral, TID, Stephanie Boas, DO, 1 tablet at 10/18/20 3216  •  dextrose 5 % and sodium chloride 0.2 % infusion, 50 mL/hr, Intravenous, Continuous, Kavita Laser, DO, Last Rate: 50 mL/hr at 10/18/20 0945, 50 mL/hr at 10/18/20 0945  •  heparin (porcine) subcutaneous injection 5,000 Units, 5,000 Units, Subcutaneous, Q8H [address], Young Medel, DO, 5,000 Units at 10/18/20 0618  •  insulin glargine (LANTUS) subcutaneous injection 10 Units 0.1 mL, 10 Units, Subcutaneous, HS, Esthela Vo, DO  •  insulin lispro (HumaLOG) 100 units/mL subcutaneous injection 1-6 Units, 1-6 Units, Subcutaneous, 4x Daily (AC & HS), 2 Units at 10/18/20 0920 **AND** Fingerstick Glucose (POCT), , , 4x Daily AC and at bedtime, Aurase Morrow, DO  •  levalbuterol (XOPENEX) inhalation solution 1.25 mg, 1.25 mg, Nebulization, Q6H PRN, [facility], DO  •  metroNIDAZOLE (FLAGYL) IVPB (premix) 500 mg 100 mL, 500 mg, Intravenous, Q8H, Young Medel DO, Last Rate: 200 mL/hr at 10/18/20 0619, 500 mg at 10/18/20 4908  •  ondansetron (ZOFRAN) injection 4 mg, 4 mg, Intravenous, Q6H PRN, Aura Odalys, DO  •  pantoprazole (PROTONIX) EC tablet 40 mg, 40 mg, Oral, Early Morning, Young Medel DO  •  polyethylene glycol (MIRALAX) packet 17 g, 17 g, Oral, BID, Young Medel DO, 17 g at 10/16/20 2143  •  senna-docusate sodium (SENOKOT S) 8.6-50 mg per tablet 1 tablet, 1 tablet, Oral, BID, Esthela Vo, DO  •  traZODone (DESYREL) tablet 150 mg, 150 mg, Oral, HS, Young Medel DO, 150 mg at 10/17/20 2251  •  vancomycin (VANCOCIN) IVPB (premix in dextrose) 1,000 mg 200 mL, 15 mg/kg, Intravenous, Q24H, Esthela Vo, , Last Rate: 200 mL/hr at 10/18/20 0922, 1,000 mg at 10/18/20 6412    Laboratory Results:  Results from last 7 days   Lab Units 10/18/20  0907 10/17/20  0728 10/16/20  0502 10/15/20  2100   WBC Thousand/uL 11.15* 12.76* 17.23* 17.47*   HEMOGLOBIN g/dL 8.7* 8.2* 9.0* 9.4*   HEMATOCRIT % 30.0* 27.7* 31.0* 31.8*   PLATELETS Thousands/uL 211 242 283 331   POTASSIUM mmol/L 3.8 4.1 5.0 5.1   CHLORIDE mmol/L 105 112* 114* 110*   CO2 mmol/L 24 26 19* 23   BUN mg/dL 25 42* 68* 70*   CREATININE mg/dL 1.41* 1.61* 2.40* 2.86*   CALCIUM mg/dL 8.5 9.0 9.2 9.8   MAGNESIUM mg/dL  --  1.7  --   --

## 2020-10-18 NOTE — CONSULTS
Consultation - Infectious Disease   Adilene Foster 69 y.o. male MRN: [id number]  Unit/Bed#: E2 -01 Encounter: [id number]      IMPRESSION & RECOMMENDATIONS:   1. Sepsis-POA. Fever, leukocytosis. Was initially felt to be secondary to an aspiration event with possible pneumonia. However blood cultures have now grown Staphylococcus aureus that is likely MRSA. He has clinically improved with a decreased temperature curve, decreased WBC count, decreased work of breathing. His procalcitonin level has normalized.  -discontinue ceftriaxone, Flagyl, and azithromycin  -continue vancomycin for now at current dose  -consult pharmacy for vancomycin trough management  -recheck blood cultures x2 sets  -follow-up final sensitivities and adjust antibiotics as needed  -recheck CMP and CBC with diff  -supportive care    2. Staphylococcus aureus bacteremia-likely MRSA based upon the urine culture results. No definitive etiology although the patient's urine culture is also positive for MRSA. As the patient does not have chronic indwelling Alford catheter in place, must consider the possibility of more of a disseminated process that went to the urine. Unclear if this is a true bacteremia as the patient clinically improved without any treatment for MRSA, however this will be difficult to ignore, especially since repeat blood cultures were not obtained prior to giving vancomycin.  -antibiotics as above  -follow-up final sensitivities and adjust antibiotics as needed  -will need to watch for STEPHEN as the STEPHEN in the urine was 2 which could predict a high risk of failure  -recheck blood cultures x2 sets confirm clearance of the bacteremia  -check transthoracic echocardiogram  -no additional ID workup for now    3. Possible MRSA UTI-more likely that this was a hematogenous process as the patient does not have chronic Alford catheter in place.   No complication seen on CT the abdomen pelvis  -antibiotics as above  -no additional ID workup for now    4. Acute kidney injury-complicating chronic kidney disease. Likely a pre renal issue. Perhaps medications were playing a role. Perhaps ATN was playing a role. The renal function has improved with resuscitation.  -dose adjusted antibiotics  -recheck BMP  -volume management  -nephrology follow-up    5. Diabetes mellitus-type 2 with hyperglycemia    6. Dementia-associated with Alzheimer's disease and Parkinson's disease. Have discussed the above management plan in detail with the primary service    Extensive review of the medical records in epic including review of the notes, radiographs, and laboratory results     HISTORY OF PRESENT ILLNESS:  Reason for Consult:  MRSA bacteremia  HPI: Iona Soni is a 69y.o. year old male Parkinson's disease and Alzheimer's disease with dementia as well as diabetes mellitus and congestive heart failure, and chronic kidney disease admitted [facility] in [facility] with increased work of breathing and hypoxemia who I am asked to assist with antibiotic management for MRSA bacteremia. The patient had baseline is wheelchair and bed-bound is not very verbal.  The several days prior to this admission he had poor oral intake. He was taken to the primary care provider on 10/15/2020 and found to be hypoxemic. He was instructed that patient should go to the emergency department however the patient was taken home. At home he had increased work of breathing and therefore he was brought to the ER for further evaluation. In the emergency department he was found to be lethargic with acute kidney injury. The patient had blood cultures and urine cultures obtained and was started on ceftriaxone, Flagyl, and azithromycin for possible aspiration pneumonia. CT the abdomen pelvis revealed impaction but no other source of an infection process. Chest x-ray did not reveal evidence of pneumonia.   The patient apparently has clinically improved with decreased in temperature curve decreased WBC count and decreased work of breathing. One of his 2 blood cultures came back positive for gram-positive cocci which has now been identified as Staphylococcus aureus. He therefore was given a dose of vancomycin earlier this morning. Patient is not able to give any detailed history and therefore the entire history is through chart review and discussion with the primary service. REVIEW OF SYSTEMS:  A complete review of systems is negative other than that noted in the HPI. PAST MEDICAL HISTORY:  Past Medical History:   Diagnosis Date   • Alzheimer disease [facility])    • Ambulatory dysfunction    • CAD (coronary artery disease)    • CHF (congestive heart failure) ([address])    • Constipation    • Dementia (HCC)    • Diabetes mellitus ([address])    • GERD (gastroesophageal reflux disease)    • Hyperlipidemia    • Hypertension    • NSTEMI (non-ST elevated myocardial infarction) ([address])    • Parkinson disease ([address])      Past Surgical History:   Procedure Laterality Date   • BACK SURGERY         FAMILY HISTORY:  Non-contributory    SOCIAL HISTORY:  Social History   Social History     Substance and Sexual Activity   Alcohol Use Not Currently     Social History     Substance and Sexual Activity   Drug Use Never     Social History     Tobacco Use   Smoking Status Former Smoker   Smokeless Tobacco Never Used       ALLERGIES:  Allergies   Allergen Reactions   • Rice        MEDICATIONS:  All current active medications have been reviewed.   Antibiotics:  Ceftriaxone, metronidazole, azithromycin 4, vancomycin 1    PHYSICAL EXAM:  Temp:  [97.6 °F (36.4 °C)-99.2 °F (37.3 °C)] 99.2 °F (37.3 °C)  HR:  [61-79] 63  Resp:  [16-18] 18  BP: (120-146)/(60-72) 140/66  SpO2:  [97 %-99 %] 99 %  Temp (24hrs), Av.2 °F (36.8 °C), Min:97.6 °F (36.4 °C), Max:99.2 °F (37.3 °C)  Current: Temperature: 99.2 °F (37.3 °C)    Intake/Output Summary (Last 24 hours) at 10/18/2020 7954  Last data filed at 10/17/2020 1746  Gross per 24 hour   Intake --   Output 500 ml   Net -500 ml       General Appearance:  Appearing chronically ill, nontoxic, and in no distress   Head:  Normocephalic, without obvious abnormality, atraumatic   Eyes:  Conjunctiva pale and sclera anicteric, both eyes   Nose: Nares normal, mucosa normal, no drainage   Throat: Oropharynx moist without lesions   Neck: Supple, symmetrical, no adenopathy, no tenderness/mass/nodules   Back:   Symmetric, no curvature, ROM normal, no CVA tenderness   Lungs:   Clear to auscultation bilaterally, respirations unlabored   Chest Wall:  No tenderness or deformity   Heart:  RRR; no murmur, rub or gallop   Abdomen:   Soft, non-tender, non-distended, positive bowel sounds    Extremities: No cyanosis, clubbing or edema   Skin: No rashes or lesions. No draining wounds noted. Lymph nodes: Cervical, supraclavicular nodes normal   Neurologic: Alert, minimal verbalization, appears to move all 4 extremities, bradykinesia       LABS, IMAGING, & OTHER STUDIES:  Lab Results:  I have personally reviewed pertinent labs. Results from last 7 days   Lab Units 10/18/20  0907 10/17/20  0728 10/16/20  0502   WBC Thousand/uL 11.15* 12.76* 17.23*   HEMOGLOBIN g/dL 8.7* 8.2* 9.0*   PLATELETS Thousands/uL 211 242 283     Results from last 7 days   Lab Units 10/18/20  0907 10/17/20  0728 10/16/20  0502 10/15/20  2100   SODIUM mmol/L 136 148* 146* 147*   POTASSIUM mmol/L 3.8 4.1 5.0 5.1   CHLORIDE mmol/L 105 112* 114* 110*   CO2 mmol/L 24 26 19* 23   BUN mg/dL 25 42* 68* 70*   CREATININE mg/dL 1.41* 1.61* 2.40* 2.86*   EGFR ml/min/1.73sq m 50 42 26 21   CALCIUM mg/dL 8.5 9.0 9.2 9.8   AST U/L 87*  --  89* 130*   ALT U/L 87*  --  150* 133*   ALK PHOS U/L 93  --  106 117*     Results from last 7 days   Lab Units 10/16/20  0435 10/15/20  2302 10/15/20  2107 10/15/20  2055   BLOOD CULTURE   --   --  No Growth at 48 hrs.  Staphylococcus aureus*   GRAM STAIN RESULT   --   --   --  Gram positive cocci in clusters*   URINE CULTURE   --  >100,000 cfu/ml Methicillin Resistant Staphylococcus aureus*  --   --    LEGIONELLA URINARY ANTIGEN  Negative  --   --   --      Results from last 7 days   Lab Units 10/17/20  0728 10/16/20  0502 10/15/20  2100   PROCALCITONIN ng/ml 0.18 0.43* 0.53*                   Imaging Studies:     CT abdomen pelvis-fecal impaction    Chest x-ray-clear lung fields    Images personally reviewed by me in PACS

## 2020-10-18 NOTE — PROGRESS NOTES
The metronidazole has / have been converted to Oral per [facility] IV-to-PO Auto-Conversion Protocol for Adults as approved by the Pharmacy and Therapeutics Committee. The patient met all eligible criteria:  1) Age = > 25years old   2) Received at least one dose of the IV form   3) Receiving at least one other scheduled oral/enteral medication   4) Tolerating an oral/enteral diet   and did not have any exclusions:   1) Critical care patient   2) Active GI bleed (IF assessing H2RAs or PPIs)   3) Continuous tube feeding (IF assessing cipro, doxycycline, levofloxacin, minocycline, rifampin, or voriconazole)   4) Receiving PO vancomycin (IF assessing metronidazole)   5) Persistent nausea and/or vomiting   6) Ileus or gastrointestinal obstruction   7) Shreya/nasogastric tube set for continuous suction   8) Specific order not to automatically convert to PO (in the order's comments or if discussed in the most recent Infectious Disease or primary team's progress notes).

## 2020-10-18 NOTE — PROGRESS NOTES
Progress Note - Ilia Lopesorel 69 y.o. male MRN: [id number]    Unit/Bed#: E2 -01 Encounter: [id number]    Assessment/Plan:    Sepsis    POA, but now hemodynamically stable    Pneumonia   aspiration versus community-acquired, continue azithromycin, Flagyl, add vancomycin with 1 of 2 blood cultures growing Gram-positive cocci and MRSA in urine    UTI    MRSA, started vancomycin    Hypernatremia  Corrected D/C IVF, sodium 136    A KI/CKD 3   improved with IV fluids, appears at baseline creatinine 1.41, d/c IVF    Diabetes   glucose elevated last 24 hours 179 to 300 with IV dextrose, will initiate Lantus with ADA diet and sliding scale       Dyslipidemia   continue statin for LDL control    GERD    continue PPI for acid control       Subjective:   Feels good (beaulieu), minimal response      Objective:     Vitals: Blood pressure 120/60, pulse 61, temperature 99.2 °F (37.3 °C), temperature source Temporal, resp. rate 18, weight 61 kg (134 lb 7.7 oz), SpO2 98 %. ,Body mass index is 21.06 kg/m². Results from last 7 days   Lab Units 10/17/20  0728  10/15/20  2100   WBC Thousand/uL 12.76*   < > 17.47*   HEMOGLOBIN g/dL 8.2*   < > 9.4*   HEMATOCRIT % 27.7*   < > 31.8*   PLATELETS Thousands/uL 242   < > 331   INR   --   --  1.22*    < > = values in this interval not displayed.      Results from last 7 days   Lab Units 10/17/20  0728 10/16/20  0502   POTASSIUM mmol/L 4.1 5.0   CHLORIDE mmol/L 112* 114*   CO2 mmol/L 26 19*   BUN mg/dL 42* 68*   CREATININE mg/dL 1.61* 2.40*   CALCIUM mg/dL 9.0 9.2   ALK PHOS U/L  --  106   ALT U/L  --  150*   AST U/L  --  89*       Scheduled Meds:  Current Facility-Administered Medications   Medication Dose Route Frequency Provider Last Rate   • acetaminophen  650 mg Oral Q6H PRN Mana Del Cid,      • atorvastatin  40 mg Oral Daily With I AM AT, DO     • azithromycin  500 mg Oral Q24H Gama Woodard DO     • carbidopa-levodopa  1 tablet Oral TID Ashley Flowers Gianfranco, DO     • dextrose 5 % and sodium chloride 0.2 %  100 mL/hr Intravenous Continuous XochiltSID Rosario 100 mL/hr (10/17/20 2248)   • heparin (porcine)  5,000 Units Subcutaneous [facility] Young Medel, DO     • insulin lispro  1-6 Units Subcutaneous 4x Daily (AC & HS) Chato Pounds, DO     • levalbuterol  1.25 mg Nebulization Q6H PRN Chato Pounds, DO     • metroNIDAZOLE  500 mg Intravenous Q8H Young Medel  mg (10/18/20 8553)   • ondansetron  4 mg Intravenous Q6H PRN Chato Pounds, DO     • pantoprazole  40 mg Oral Early Morning Young Medel, DO     • polyethylene glycol  17 g Oral BID Chato Pounds, DO     • traZODone  150 mg Oral HS Young Medel, DO     • vancomycin  15 mg/kg Intravenous Q24H Jim Monroy, DO         Continuous Infusions:  dextrose 5 % and sodium chloride 0.2 %, 100 mL/hr, Last Rate: 100 mL/hr (10/17/20 2248)          Physical exam:  General appearance:  Alert no distress poor interaction confused  Head/Eyes:  Nonicteric PERRL EOMI  Neck:  Supple  Lungs:  Decreased BS bilateral no wheezing rhonchi or rales  Heart: normal S1 S2 regular  Abdomen: Soft nontender with bowel sounds  Extremities: no edema  Skin: no rash    Invasive Devices     Peripheral Intravenous Line            Peripheral IV 10/15/20 Left Antecubital 2 days    Peripheral IV 10/15/20 Right Antecubital 2 days                  VTE Pharmacologic Prophylaxis:  Heparin      Counseling / Coordination of Care  Total floor / unit time spent today 30  minutes. Greater than 50% of total time was spent with the patient and / or family counseling and / or coordination of care.   A description of the counseling / coordination of care:

## 2020-10-19 ENCOUNTER — APPOINTMENT (INPATIENT)
Dept: NON INVASIVE DIAGNOSTICS | Facility: HOSPITAL | Age: 70
DRG: 871 | End: 2020-10-19
Payer: COMMERCIAL

## 2020-10-19 PROBLEM — E87.0 HYPERNATREMIA: Status: RESOLVED | Noted: 2020-10-17 | Resolved: 2020-10-19

## 2020-10-19 PROBLEM — D64.9 ANEMIA: Status: ACTIVE | Noted: 2020-10-19

## 2020-10-19 PROBLEM — K59.00 CONSTIPATION: Status: RESOLVED | Noted: 2019-10-21 | Resolved: 2020-10-19

## 2020-10-19 PROBLEM — R78.81 MRSA BACTEREMIA: Status: ACTIVE | Noted: 2020-10-19

## 2020-10-19 PROBLEM — N39.0 UTI (URINARY TRACT INFECTION): Status: ACTIVE | Noted: 2020-10-19

## 2020-10-19 PROBLEM — N17.9 AKI (ACUTE KIDNEY INJURY) (HCC): Status: RESOLVED | Noted: 2020-10-16 | Resolved: 2020-10-19

## 2020-10-19 PROBLEM — B95.62 MRSA BACTEREMIA: Status: ACTIVE | Noted: 2020-10-19

## 2020-10-19 PROBLEM — N18.30 CKD (CHRONIC KIDNEY DISEASE) STAGE 3, GFR 30-59 ML/MIN (HCC): Status: ACTIVE | Noted: 2020-10-19

## 2020-10-19 PROBLEM — E83.52 HYPERCALCEMIA: Status: ACTIVE | Noted: 2020-10-19

## 2020-10-19 LAB
ALBUMIN SERPL BCP-MCNC: 2.5 G/DL (ref 3.5–5)
ALP SERPL-CCNC: 102 U/L (ref 46–116)
ALT SERPL W P-5'-P-CCNC: 59 U/L (ref 12–78)
ANION GAP SERPL CALCULATED.3IONS-SCNC: 10 MMOL/L (ref 4–13)
AST SERPL W P-5'-P-CCNC: 76 U/L (ref 5–45)
BASOPHILS # BLD AUTO: 0.04 THOUSANDS/ÂΜL (ref 0–0.1)
BASOPHILS NFR BLD AUTO: 0 % (ref 0–1)
BILIRUB SERPL-MCNC: 0.32 MG/DL (ref 0.2–1)
BUN SERPL-MCNC: 19 MG/DL (ref 5–25)
CALCIUM ALBUM COR SERPL-MCNC: 10.3 MG/DL (ref 8.3–10.1)
CALCIUM SERPL-MCNC: 9.1 MG/DL (ref 8.3–10.1)
CHLORIDE SERPL-SCNC: 104 MMOL/L (ref 100–108)
CO2 SERPL-SCNC: 24 MMOL/L (ref 21–32)
CREAT SERPL-MCNC: 1.43 MG/DL (ref 0.6–1.3)
EOSINOPHIL # BLD AUTO: 0.35 THOUSAND/ÂΜL (ref 0–0.61)
EOSINOPHIL NFR BLD AUTO: 3 % (ref 0–6)
ERYTHROCYTE [DISTWIDTH] IN BLOOD BY AUTOMATED COUNT: 11.4 % (ref 11.6–15.1)
EST. AVERAGE GLUCOSE BLD GHB EST-MCNC: 174 MG/DL
GFR SERPL CREATININE-BSD FRML MDRD: 49 ML/MIN/1.73SQ M
GLUCOSE SERPL-MCNC: 108 MG/DL (ref 65–140)
GLUCOSE SERPL-MCNC: 116 MG/DL (ref 65–140)
GLUCOSE SERPL-MCNC: 119 MG/DL (ref 65–140)
GLUCOSE SERPL-MCNC: 170 MG/DL (ref 65–140)
GLUCOSE SERPL-MCNC: 178 MG/DL (ref 65–140)
HBA1C MFR BLD: 7.7 %
HCT VFR BLD AUTO: 30.9 % (ref 36.5–49.3)
HGB BLD-MCNC: 9.2 G/DL (ref 12–17)
IMM GRANULOCYTES # BLD AUTO: 0.04 THOUSAND/UL (ref 0–0.2)
IMM GRANULOCYTES NFR BLD AUTO: 0 % (ref 0–2)
LYMPHOCYTES # BLD AUTO: 2.49 THOUSANDS/ÂΜL (ref 0.6–4.47)
LYMPHOCYTES NFR BLD AUTO: 24 % (ref 14–44)
MCH RBC QN AUTO: 28.2 PG (ref 26.8–34.3)
MCHC RBC AUTO-ENTMCNC: 29.8 G/DL (ref 31.4–37.4)
MCV RBC AUTO: 95 FL (ref 82–98)
MONOCYTES # BLD AUTO: 0.64 THOUSAND/ÂΜL (ref 0.17–1.22)
MONOCYTES NFR BLD AUTO: 6 % (ref 4–12)
NEUTROPHILS # BLD AUTO: 7.05 THOUSANDS/ÂΜL (ref 1.85–7.62)
NEUTS SEG NFR BLD AUTO: 67 % (ref 43–75)
NRBC BLD AUTO-RTO: 0 /100 WBCS
PLATELET # BLD AUTO: 234 THOUSANDS/UL (ref 149–390)
PMV BLD AUTO: 11.9 FL (ref 8.9–12.7)
POTASSIUM SERPL-SCNC: 4.1 MMOL/L (ref 3.5–5.3)
PROT SERPL-MCNC: 8 G/DL (ref 6.4–8.2)
RBC # BLD AUTO: 3.26 MILLION/UL (ref 3.88–5.62)
SODIUM SERPL-SCNC: 138 MMOL/L (ref 136–145)
WBC # BLD AUTO: 10.61 THOUSAND/UL (ref 4.31–10.16)

## 2020-10-19 PROCEDURE — 80053 COMPREHEN METABOLIC PANEL: CPT | Performed by: INTERNAL MEDICINE

## 2020-10-19 PROCEDURE — 99232 SBSQ HOSP IP/OBS MODERATE 35: CPT | Performed by: PHYSICIAN ASSISTANT

## 2020-10-19 PROCEDURE — 82948 REAGENT STRIP/BLOOD GLUCOSE: CPT

## 2020-10-19 PROCEDURE — 99232 SBSQ HOSP IP/OBS MODERATE 35: CPT | Performed by: INTERNAL MEDICINE

## 2020-10-19 PROCEDURE — 92526 ORAL FUNCTION THERAPY: CPT

## 2020-10-19 PROCEDURE — 83036 HEMOGLOBIN GLYCOSYLATED A1C: CPT | Performed by: PHYSICIAN ASSISTANT

## 2020-10-19 PROCEDURE — 93306 TTE W/DOPPLER COMPLETE: CPT | Performed by: INTERNAL MEDICINE

## 2020-10-19 PROCEDURE — 93306 TTE W/DOPPLER COMPLETE: CPT

## 2020-10-19 PROCEDURE — 85025 COMPLETE CBC W/AUTO DIFF WBC: CPT | Performed by: INTERNAL MEDICINE

## 2020-10-19 RX ORDER — NIFEDIPINE 30 MG/1
30 TABLET, EXTENDED RELEASE ORAL DAILY
Status: DISCONTINUED | OUTPATIENT
Start: 2020-10-19 | End: 2020-10-19

## 2020-10-19 RX ORDER — AMLODIPINE BESYLATE 2.5 MG/1
2.5 TABLET ORAL DAILY
Status: DISCONTINUED | OUTPATIENT
Start: 2020-10-20 | End: 2020-10-20

## 2020-10-19 RX ADMIN — INSULIN GLARGINE 10 UNITS: 100 INJECTION, SOLUTION SUBCUTANEOUS at 23:54

## 2020-10-19 RX ADMIN — INSULIN LISPRO 1 UNITS: 100 INJECTION, SOLUTION INTRAVENOUS; SUBCUTANEOUS at 16:52

## 2020-10-19 RX ADMIN — CARBIDOPA AND LEVODOPA 1 TABLET: 25; 100 TABLET ORAL at 08:06

## 2020-10-19 RX ADMIN — INSULIN LISPRO 1 UNITS: 100 INJECTION, SOLUTION INTRAVENOUS; SUBCUTANEOUS at 11:39

## 2020-10-19 RX ADMIN — NIFEDIPINE 30 MG: 30 TABLET, FILM COATED, EXTENDED RELEASE ORAL at 11:37

## 2020-10-19 RX ADMIN — DOCUSATE SODIUM AND SENNOSIDES 1 TABLET: 8.6; 5 TABLET, FILM COATED ORAL at 08:06

## 2020-10-19 RX ADMIN — TRAZODONE HYDROCHLORIDE 150 MG: 100 TABLET ORAL at 23:53

## 2020-10-19 RX ADMIN — VANCOMYCIN HYDROCHLORIDE 1250 MG: 5 INJECTION, POWDER, LYOPHILIZED, FOR SOLUTION INTRAVENOUS at 06:15

## 2020-10-19 RX ADMIN — HEPARIN SODIUM 5000 UNITS: 5000 INJECTION INTRAVENOUS; SUBCUTANEOUS at 14:47

## 2020-10-19 RX ADMIN — CARBIDOPA AND LEVODOPA 1 TABLET: 25; 100 TABLET ORAL at 23:54

## 2020-10-19 NOTE — ASSESSMENT & PLAN NOTE
1 out of 2 blood cultures positive on admission growing MRSA   Urine culture growing the same   Repeat blood cultures negative x 24 hours   Will need PICC placement once blood cultures negative x 72 hours for 4 weeks of IV antibiotics through 11/14   Echo: no vegetation/endocarditis   Follow hemodynamics, fever curve, trend WBC  Follow cultures

## 2020-10-19 NOTE — ASSESSMENT & PLAN NOTE
· Parkinson's dementia not much verbal at baseline and essentially bed/wheelchair bound  · Continue sinemet 25/100 t.i.d.  · speech eval - level 2 with thin liquids   · Spoke with patient's daughter-in-law at length using blue phone, patient lives with her and is non-verbal at baseline,  He is non ambulatory and they carry him to a recliner, she states he is at his baseline and  Has been this way since 2009, she states he always eats slowly takes about an hour to eat

## 2020-10-19 NOTE — ASSESSMENT & PLAN NOTE
· Lisinopril on hold given DREW  · Nephrology recommending NOT restarting on discharge   · BP elevated this AM, spoke with Nephrology, given nifedipine xL 30 mg x1, per recommendations will transition to amlodipine 2.5 mg tomorrow and monitor VS, adjust as needed

## 2020-10-19 NOTE — PROGRESS NOTES
Vancomycin IV Pharmacy-to-Dose Consultation    Tapan House is a 69 y.o. male who is currently receiving Vancomycin IV with management by the Pharmacy Consult service. Assessment/Plan:  The patient was reviewed. Renal function is stable and no signs or symptoms of nephrotoxicity and/or infusion reactions were documented in the chart. Based on today’s assessment, continue current vancomycin (day # 2) dosing of 1250mg iv q24h, with a plan for trough to be drawn at 0600 on 10-. We will continue to follow the patient’s culture results and clinical progress daily.     Camila Pan, Pharmacist

## 2020-10-19 NOTE — ASSESSMENT & PLAN NOTE
In setting of CKD   Will need workup as outpatient with PCP   No evidence of active bleeding     Lab Results   Component Value Date    HGB 9.2 (L) 10/19/2020    HGB 8.7 (L) 10/18/2020    HGB 8.2 (L) 10/17/2020    HGB 9.0 (L) 10/16/2020    HGB 9.4 (L) 10/15/2020

## 2020-10-19 NOTE — PROGRESS NOTES
Progress Note - Infectious Disease   [facility] 69 [facility]N: [id number]  Unit/Bed#: E2 -01 Encounter: [id number]    Impression/Plan:  1. Sepsis. POA. Fever and leukocytosis. Was initially felt to be secondary to an aspiration event with possible pneumonia, however blood cultures show MRSA bacteremia. Fortunately the patient has clinically improved. Fever curve and WBC count have trended down. His procalcitonin level has normalized. Repeat blood cultures are pending.  -antibiotic as below  -check CBC and BMP tomorrow  -follow up repeat blood cultures  -monitor vitals  -supportive care     2. MRSA bacteremia. No definitive etiology although the patient's urine culture is also positive for MRSA. As the patient does not have chronic indwelling Alford catheter in place, must consider the possibility of more of a disseminated process that went to the urine. Unclear if this is a true bacteremia as the patient clinically improved without any treatment for MRSA, however this will be difficult to ignore, especially since repeat blood cultures were not obtained prior to giving vancomycin. The patient is currently receiving IV vancomycin and appears to be tolerating antibiotic treatment without difficulty. Will continue for now. He will need to complete a TTE. Repeat blood cultures are pending.  -continue IV vancomycin  -continue pharmacy consult for guidance on vancomycin dosage  -check CBCD and BMP tomorrow  -follow up repeat blood cultures  -will need to watch for STEPHEN as the STEPHEN in the urine was 2 which could predict a high risk of failure  -check TTE  -monitor vitals     3. Possible MRSA UTI. More likely that this was a hematogenous process as the patient does not have chronic Alford catheter in place. No complication seen on CT the abdomen pelvis.  -antibiotics as above  -monitor  symptoms  -monitor urine output  -no additional ID workup for now     4. Acute kidney injury. On chronic kidney disease. Likely a pre renal issue. Perhaps medications were playing a role. Perhaps ATN was playing a role. The renal function has improved with resuscitation. -monitor BMP  -dose adjust antibiotics for renal function as needed  -continue follow-up with Nephrology     5. Type 2 diabetes mellitus with hyperglycemia without long-term insulin use. Patient's last hemoglobin A1c was 7.3% on 2020. Elevated blood glucose is risk factor for infection. Recommend tighter glycemic control for overall health, especially in the setting of infection. A repeat hemoglobin A1c is currently pending.  -follow up repeat hemoglobin A1c  -blood glucose management per primary service     6. Dementia. Associated with Alzheimer's disease and Parkinson's disease.  -supportive care    Antibiotics:  Vancomycin 2  Antibiotics 5    Subjective:  Unable to obtain ROS, patient is nonverbal with dementia. Objective:  Vitals:  Temp:  [97.9 °F (36.6 °C)-98.2 °F (36.8 °C)] 97.9 °F (36.6 °C)  HR:  [63-73] 73  Resp:  [16-18] 16  BP: (140-196)/(66-88) 182/86  SpO2:  [98 %-99 %] 99 %  Temp (24hrs), Av.1 °F (36.7 °C), Min:97.9 °F (36.6 °C), Max:98.2 °F (36.8 °C)  Current: Temperature: 97.9 °F (36.6 °C)    Physical Exam:   General Appearance:  Awake, nonverbal and minimally interactive due to Alzheimer's, nontoxic, in no acute distress. Patient appears chronically ill. He appears comfortable sitting up in bed. Throat: Oropharynx moist without lesions. Lungs:   Clear to auscultation bilaterally; no wheezes, rhonchi or rales; respirations unlabored; patient is on room air   Heart:  RRR; no murmur, rub or gallop   Abdomen:   Soft, no facial grimace with palpation, non-distended, positive bowel sounds. Extremities: No clubbing or cyanosis, no edema   Skin: No new rashes, lesions, or draining wounds noted on exposed skin.      Labs, Imaging, & Other studies:   All pertinent labs and imaging studies were personally reviewed  Results from last 7 days   Lab Units 10/19/20  0443 10/18/20  0907 10/17/20  0728   WBC Thousand/uL 10.61* 11.15* 12.76*   HEMOGLOBIN g/dL 9.2* 8.7* 8.2*   PLATELETS Thousands/uL 234 211 242     Results from last 7 days   Lab Units 10/19/20  0443 10/18/20  0907  10/16/20  0502   POTASSIUM mmol/L 4.1 3.8   < > 5.0   CHLORIDE mmol/L 104 105   < > 114*   CO2 mmol/L 24 24   < > 19*   BUN mg/dL 19 25   < > 68*   CREATININE mg/dL 1.43* 1.41*   < > 2.40*   EGFR ml/min/1.73sq m 49 50   < > 26   CALCIUM mg/dL 9.1 8.5   < > 9.2   AST U/L 76* 87*  --  89*   ALT U/L 59 87*  --  150*   ALK PHOS U/L 102 93  --  106    < > = values in this interval not displayed. Results from last 7 days   Lab Units 10/18/20  1627 10/18/20  1626 10/16/20  0435 10/15/20  2302 10/15/20  2105 10/15/20  2055   BLOOD CULTURE  Received in Microbiology Lab. Culture in Progress. Received in Microbiology Lab. Culture in Progress. --   --  No Growth at 72 hrs.  Methicillin Resistant Staphylococcus aureus*   GRAM STAIN RESULT   --   --   --   --   --  Gram positive cocci in clusters*   URINE CULTURE   --   --   --  >100,000 cfu/ml Methicillin Resistant Staphylococcus aureus*  --   --    LEGIONELLA URINARY ANTIGEN   --   --  Negative  --   --   --      Results from last 7 days   Lab Units 10/17/20  0728 10/16/20  0502 10/15/20  2100   PROCALCITONIN ng/ml 0.18 0.43* 0.53*

## 2020-10-19 NOTE — ASSESSMENT & PLAN NOTE
· Lisinopril on hold given DREW  · Nephrology recommending NOT restarting on discharge   · Started on amlodipine, increased to 5 mg once daily   · Monitor VS trend

## 2020-10-19 NOTE — PROGRESS NOTES
NEPHROLOGY PROGRESS NOTE   Lucinda Lopesorel 69 y.o. male MRN: [id number]  Unit/Bed#: E2 -01 Encounter: [id number]      ASSESSMENT & PLAN      1. Acute Kidney Injury was present on admission and this has resolved  o Etiology was likely pre renal azotemia with a component of ATN while Ace inhibition  o ACE-inhibitor on hold and received IV fluids and creatinine improved now to baseline  o Will monitor off IV fluids  o Monitor antibiotic levels  o Medications otherwise appropriately dosed  o Urinalysis shows a specific gravity 1.025, 2+ proteinuria this should be worked up as an outpatient  o CT of the abdomen and pelvis on October 15th does show extensive renal vascular calcification but no evidence of nephrolithiasis or obstructive uropathy  2. Electrolytes:  o Electrolytes currently acceptable  3. Acid/Base:  o Now anion gap and bicarb stable  4. BP/HR:  o Blood pressures were fine yesterday this morning elevated in the 190/88 range  o Give nifedipine XL 30 mg x 1 transition to amlodipine 2.5 mg as an outpatient if discharged  5. Volume Status  o Appears euvolemic  6. Anemia  o Normocytic anemia in the setting of chronic kidney disease  o Should have workup done  o Will hold on checking iron stores in the setting of being treated for infection  7. MBD  o Check phosphorus and PTH as an outpatient  8.  Health Maintanance/Risk Reduction  o Sepsis secondary to pneumonia UTI-has a staph aureus bacteremia-being treated with vancomycin      SUBJECTIVE:    Patient was seen today  Resting comfortably  No chest pain or shortness of breath no fevers or chills  No nausea vomiting  Answers with short sentences in Nepalese    OBJECTIVE:  Current Weight: Weight - Scale: 61 kg (134 lb 7.7 oz)  Vitals:    10/19/20 0806   BP: (!) 182/86   Pulse:    Resp:    Temp:    SpO2:        Intake/Output Summary (Last 24 hours) at 10/19/2020 1004  Last data filed at 10/19/2020 3417  Gross per 24 hour   Intake 300 ml   Output -- Net 300 ml     Weight (last 2 days)     Date/Time   Weight    10/18/20 0559   61 (134.48)    10/17/20 0600   60.1 (132.5)              General: conscious, cooperative, in not acute distress  Eyes: conjunctivae pink, anicteric sclerae  ENT: lips and mucous membranes moist  Neck: supple, no JVD  Chest: clear breath sounds bilateral, no crackles, ronchus or wheezings  CVS: distinct S1 & S2, normal rate, regular rhythm  Abdomen: soft, non-tender, non-distended, normoactive bowel sounds  Extremities: no edema of both legs  Skin: no rash  Neuro: awake, alert, oriented      Medications:    Current Facility-Administered Medications:   •  acetaminophen (TYLENOL) tablet 650 mg, 650 mg, Oral, Q6H PRN, Link Goldmann, DO  •  atorvastatin (LIPITOR) tablet 40 mg, 40 mg, Oral, Daily With Dinner, Young Medel DO, 40 mg at 10/18/20 1620  •  carbidopa-levodopa (SINEMET)  mg per tablet 1 tablet, 1 tablet, Oral, TID, Link Goldmann, DO, 1 tablet at 10/19/20 0806  •  heparin (porcine) subcutaneous injection 5,000 Units, 5,000 Units, Subcutaneous, Q8H [address], Young Medel DO, 5,000 Units at 10/18/20 2140  •  insulin glargine (LANTUS) subcutaneous injection 10 Units 0.1 mL, 10 Units, Subcutaneous, HS, Ford Holstein, DO, 10 Units at 10/18/20 2140  •  insulin lispro (HumaLOG) 100 units/mL subcutaneous injection 1-6 Units, 1-6 Units, Subcutaneous, 4x Daily (AC & HS), 1 Units at 10/18/20 2140 **AND** Fingerstick Glucose (POCT), , , 4x Daily AC and at bedtime, Link Goldmann, DO  •  levalbuterol (XOPENEX) inhalation solution 1.25 mg, 1.25 mg, Nebulization, Q6H PRN, Young Medel DO  •  ondansetron (ZOFRAN) injection 4 mg, 4 mg, Intravenous, Q6H PRN, Angie Medel DO  •  pantoprazole (PROTONIX) EC tablet 40 mg, 40 mg, Oral, Early Morning, Young Medel DO  •  polyethylene glycol (MIRALAX) packet 17 g, 17 g, Oral, BID, Young Medel DO, 17 g at 10/18/20 2141  •  senna-docusate sodium (SENOKOT S) 8.6-50 mg per tablet 1 tablet, 1 tablet, Oral, BID, Thea Wolf DO, 1 tablet at 10/19/20 0806  •  traZODone (DESYREL) tablet 150 mg, 150 mg, Oral, HS, Young MedelDO, 150 mg at 10/18/20 2141  •  vancomycin (VANCOCIN) 1,250 mg in sodium chloride 0.9 % 250 mL IVPB, 20 mg/kg, Intravenous, Q24H, Jarad Soni MD, Last Rate: 166.7 mL/hr at 10/19/20 0615, 1,250 mg at 10/19/20 0615    Invasive Devices:      Lab Results:   Results from last 7 days   Lab Units 10/19/20  0443 10/18/20  1627 10/18/20  1626 10/18/20  0907 10/17/20  0728 10/16/20  0502 10/15/20  2302 10/15/20  2105 10/15/20  2100 10/15/20  2055   WBC Thousand/uL 10.61*  --   --  11.15* 12.76* 17.23*  --   --  17.47*  --    HEMOGLOBIN g/dL 9.2*  --   --  8.7* 8.2* 9.0*  --   --  9.4*  --    HEMATOCRIT % 30.9*  --   --  30.0* 27.7* 31.0*  --   --  31.8*  --    PLATELETS Thousands/uL 234  --   --  211 242 283  --   --  331  --    POTASSIUM mmol/L 4.1  --   --  3.8 4.1 5.0  --   --  5.1  --    CHLORIDE mmol/L 104  --   --  105 112* 114*  --   --  110*  --    CO2 mmol/L 24  --   --  24 26 19*  --   --  23  --    BUN mg/dL 19  --   --  25 42* 68*  --   --  70*  --    CREATININE mg/dL 1.43*  --   --  1.41* 1.61* 2.40*  --   --  2.86*  --    CALCIUM mg/dL 9.1  --   --  8.5 9.0 9.2  --   --  9.8  --    MAGNESIUM mg/dL  --   --   --   --  1.7  --   --   --   --   --    ALK PHOS U/L 102  --   --  93  --  106  --   --  117*  --    ALT U/L 59  --   --  87*  --  150*  --   --  133*  --    AST U/L 76*  --   --  87*  --  89*  --   --  130*  --    BLOOD CULTURE   --  Received in Microbiology Lab. Culture in Progress. Received in Microbiology Lab. Culture in Progress. --   --   --   --  No Growth at 72 hrs.   --  Methicillin Resistant Staphylococcus aureus*   LEUKOCYTES UA   --   --   --   --   --   --  Large*  --   --   --    BLOOD UA   --   --   --   --   --   --  Small*  --   --   --        Previous work up:  Please see previous notes

## 2020-10-19 NOTE — PLAN OF CARE
Problem: Potential for Falls  Goal: Patient will remain free of falls  Description: INTERVENTIONS:  - Assess patient frequently for physical needs  -  Identify cognitive and physical deficits and behaviors that affect risk of falls.   -  [place] fall precautions as indicated by assessment.  - Educate patient/family on patient safety including physical limitations  - Instruct patient to call for assistance with activity based on assessment  - Modify environment to reduce risk of injury  - Consider OT/PT consult to assist with strengthening/mobility  Outcome: Progressing     Problem: Prexisting or High Potential for Compromised Skin Integrity  Goal: Skin integrity is maintained or improved  Description: INTERVENTIONS:  - Identify patients at risk for skin breakdown  - Assess and monitor skin integrity  - Assess and monitor nutrition and hydration status  - Monitor labs   - Assess for incontinence   - Turn and reposition patient  - Assist with mobility/ambulation  - Relieve pressure over bony prominences  - Avoid friction and shearing  - Provide appropriate hygiene as needed including keeping skin clean and dry  - Evaluate need for skin moisturizer/barrier cream  - Collaborate with interdisciplinary team   - Patient/family teaching  - Consider wound care consult   Outcome: Progressing     Problem: DISCHARGE PLANNING - CARE MANAGEMENT  Goal: Discharge to post-acute care or home with appropriate resources  Description: INTERVENTIONS:  - Conduct assessment to determine patient/family and health care team treatment goals, and need for post-acute services based on payer coverage, community resources, and patient preferences, and barriers to discharge  - Address psychosocial, clinical, and financial barriers to discharge as identified in assessment in conjunction with the patient/family and health care team  - Arrange appropriate level of post-acute services according to patient's   needs and preference and payer coverage in collaboration with the physician and health care team  - Communicate with and update the patient/family, physician, and health care team regarding progress on the discharge plan  - Arrange appropriate transportation to post-acute venues  Outcome: Progressing     Problem: INFECTION - ADULT  Goal: Absence or prevention of progression during hospitalization  Description: INTERVENTIONS:  - Assess and monitor for signs and symptoms of infection  - Monitor lab/diagnostic results  - Monitor all insertion sites, i.e. indwelling lines, tubes, and drains  - Monitor endotracheal if appropriate and nasal secretions for changes in amount and color  - [place] appropriate cooling/warming therapies per order  - Administer medications as ordered  - Instruct and encourage patient and family to use good hand hygiene technique  - Identify and instruct in appropriate isolation precautions for identified infection/condition  Outcome: Progressing  Goal: Absence of fever/infection during neutropenic period  Description: INTERVENTIONS:  - Monitor WBC    Outcome: Progressing     Problem: SAFETY ADULT  Goal: Patient will remain free of falls  Description: INTERVENTIONS:  - Assess patient frequently for physical needs  -  Identify cognitive and physical deficits and behaviors that affect risk of falls.   -  [place] fall precautions as indicated by assessment.  - Educate patient/family on patient safety including physical limitations  - Instruct patient to call for assistance with activity based on assessment  - Modify environment to reduce risk of injury  - Consider OT/PT consult to assist with strengthening/mobility  Outcome: Progressing  Goal: Maintain or return to baseline ADL function  Description: INTERVENTIONS:  -  Assess patient's ability to carry out ADLs; assess patient's baseline for ADL function and identify physical deficits which impact ability to perform ADLs (bathing, care of mouth/teeth, toileting, grooming, dressing, etc.)  - Assess/evaluate cause of self-care deficits   - Assess range of motion  - Assess patient's mobility; develop plan if impaired  - Assess patient's need for assistive devices and provide as appropriate  - Encourage maximum independence but intervene and supervise when necessary  - Involve family in performance of ADLs  - Assess for home care needs following discharge   - Consider OT consult to assist with ADL evaluation and planning for discharge  - Provide patient education as appropriate  Outcome: Progressing  Goal: Maintain or return mobility status to optimal level  Description: INTERVENTIONS:  - Assess patient's baseline mobility status (ambulation, transfers, stairs, etc.)    - Identify cognitive and physical deficits and behaviors that affect mobility  - Identify mobility aids required to assist with transfers and/or ambulation (gait belt, sit-to-stand, lift, walker, cane, etc.)  - [place] fall precautions as indicated by assessment  - Record patient progress and toleration of activity level on Mobility SBAR; progress patient to next Phase/Stage  - Instruct patient to call for assistance with activity based on assessment  - Consider rehabilitation consult to assist with strengthening/weightbearing, etc.  Outcome: Progressing     Problem: DISCHARGE PLANNING  Goal: Discharge to home or other facility with appropriate resources  Description: INTERVENTIONS:  - Identify barriers to discharge w/patient and caregiver  - Arrange for needed discharge resources and transportation as appropriate  - Identify discharge learning needs (meds, wound care, etc.)  - Arrange for interpretive services to assist at discharge as needed  - Refer to Case Management Department for coordinating discharge planning if the patient needs post-hospital services based on physician/advanced practitioner order or complex needs related to functional status, cognitive ability, or social support system  Outcome: Progressing     Problem: Knowledge Deficit  Goal: Patient/family/caregiver demonstrates understanding of disease process, treatment plan, medications, and discharge instructions  Description: Complete learning assessment and assess knowledge base. Interventions:  - Provide teaching at level of understanding  - Provide teaching via preferred learning methods  Outcome: Progressing     Problem: Nutrition/Hydration-ADULT  Goal: Nutrient/Hydration intake appropriate for improving, restoring or maintaining nutritional needs  Description: Monitor and assess patient's nutrition/hydration status for malnutrition. Collaborate with interdisciplinary team and initiate plan and interventions as ordered. Monitor patient's weight and dietary intake as ordered or per policy. Utilize nutrition screening tool and intervene as necessary. Determine patient's food preferences and provide high-protein, high-caloric foods as appropriate.      INTERVENTIONS:  - Monitor oral intake, urinary output, labs, and treatment plans  - Assess nutrition and hydration status and recommend course of action  - Evaluate amount of meals eaten  - Assist patient with eating if necessary   - Allow adequate time for meals  - Recommend/ encourage appropriate diets, oral nutritional supplements, and vitamin/mineral supplements  - Order, calculate, and assess calorie counts as needed  - Recommend, monitor, and adjust tube feedings and TPN/PPN based on assessed needs  - Assess need for intravenous fluids  - Provide specific nutrition/hydration education as appropriate  - Include patient/family/caregiver in decisions related to nutrition  Outcome: Progressing     Problem: GASTROINTESTINAL - ADULT  Goal: Maintains or returns to baseline bowel function  Description: INTERVENTIONS:  - Assess bowel function  - Encourage oral fluids to ensure adequate hydration  - Administer IV fluids if ordered to ensure adequate hydration  - Administer ordered medications as needed  - Encourage mobilization and activity  - Consider nutritional services referral to assist patient with adequate nutrition and appropriate food choices  Outcome: Progressing     Problem: SKIN/TISSUE INTEGRITY - ADULT  Goal: Skin integrity remains intact  Description: INTERVENTIONS  - Identify patients at risk for skin breakdown  - Assess and monitor skin integrity  - Assess and monitor nutrition and hydration status  - Monitor labs (i.e. albumin)  - Assess for incontinence   - Turn and reposition patient  - Assist with mobility/ambulation  - Relieve pressure over bony prominences  - Avoid friction and shearing  - Provide appropriate hygiene as needed including keeping skin clean and dry  - Evaluate need for skin moisturizer/barrier cream  - Collaborate with interdisciplinary team (i.e. Nutrition, Rehabilitation, etc.)   - Patient/family teaching  Outcome: Progressing     Problem: GENITOURINARY - ADULT  Goal: Urinary catheter remains patent  Description: INTERVENTIONS:  - Assess patency of condom catheter     Outcome: Progressing

## 2020-10-19 NOTE — ASSESSMENT & PLAN NOTE
· Sepsis POA thought to be secondary to pneumonia originally however original blood cultures on 10/15 1 out of 2 growing MRSA and urine culture growing the same   · Infectious Disease consulted   · Switched to IV vancomycin given above   · Pharmacy consult for vanc dosing   · Pending repeat blood cultures obtained on 10/18 negative x 24 hours   · Will need to place PICC with 4 weeks IV antibiotics once blood cultures negative x 72 hours   · Will need to obtain consent from family member once present at bedside   · Continue to follow cultures

## 2020-10-19 NOTE — ASSESSMENT & PLAN NOTE
Lab Results   Component Value Date    EGFR 49 10/19/2020    EGFR 50 10/18/2020    EGFR 42 10/17/2020    CREATININE 1.43 (H) 10/19/2020    CREATININE 1.41 (H) 10/18/2020    CREATININE 1.61 (H) 10/17/2020     DREW resolved   Continue to monitor renal function

## 2020-10-19 NOTE — ASSESSMENT & PLAN NOTE
· Parkinson's dementia not much verbal at baseline and essentially bed/wheelchair bound  · Continue sinemet 25/100 t.i.d.  · pending speech eval   · Spoke with patient's daughter-in-law at length today using blue phone, patient lives with her and is non-verbal at baseline,  He is non ambulatory and they carry him to a recliner, she states he is at his baseline and  Has been this way since 2009, she states he always eats slowly takes about an hour to eat, she states she feels like he looks better than usual today

## 2020-10-19 NOTE — ASSESSMENT & PLAN NOTE
· Sepsis POA thought to be secondary to pneumonia originally however original blood cultures on 10/15 1 out of 2 growing MRSA and urine culture growing the same   · Infectious Disease consulted   · Switched to IV vancomycin given above   · Pharmacy consult for vanc dosing   · Pending repeat blood cultures obtained on 10/18   · Continue to follow cultures

## 2020-10-19 NOTE — PROGRESS NOTES
Progress Note - Maurice Villalpando Jennsujose dorel 1950, 69 y.o. male MRN: [id number]  Unit/Bed#: E2 -01 Encounter: [id number]  Primary Care Provider: Nas Boyce MD   Date and time admitted to hospital: 10/15/2020  8:46 PM    * Sepsis [facility])  Assessment & Plan  · Sepsis POA thought to be secondary to pneumonia originally however original blood cultures on 10/15 1 out of 2 growing MRSA and urine culture growing the same   · Infectious Disease consulted   · Switched to IV vancomycin given above   · Pharmacy consult for vanc dosing   · Pending repeat blood cultures obtained on 10/18   · Continue to follow cultures       MRSA bacteremia  Assessment & Plan  1 out of 2 blood cultures positive on admission growing MRSA   Urine culture growing the same   Repeat blood cultures pending   Echo obtained, pending formal read   Follow hemodynamics, fever curve, trend WBC  Follow cultures       UTI (urinary tract infection)  Assessment & Plan  MRSA UTI   Continue IV vancomycin for now   Follow cultures   See plan above     DREW (acute kidney injury) (HCC)-resolved as of 10/19/2020  Assessment & Plan  · DREW likely secondary to poor intake with lisinopril use  · Hold lisinopril. · Hold metformin. · Continue to trend. Appears back to baseline   · Nephrology following, spoke with Dr. Nanda Saenz today   · Monitoring off IVF hydration     Results from last 7 days   Lab Units 10/19/20  0443 10/18/20  0907 10/17/20  0728 10/16/20  0502 10/15/20  2100   BUN mg/dL 19 25 42* 68* 70*   CREATININE mg/dL 1.43* 1.41* 1.61* 2.40* 2.86*       Hyperlipidemia  Assessment & Plan  · Hyperlipidemia.     · Substitute crestor with atorvastatin based on hospital formulary    Hypertension  Assessment & Plan  · Lisinopril on hold given DREW  · Nephrology recommending NOT restarting on discharge   · BP elevated this AM, spoke with Nephrology, given nifedipine xL 30 mg x1, per recommendations will transition to amlodipine 2.5 mg tomorrow and monitor VS, adjust as needed     Parkinson disease ([address])  Assessment & Plan  · Parkinson's dementia not much verbal at baseline and essentially bed/wheelchair bound  · Continue sinemet 25/100 t.i.d.  · pending speech christosal   · Spoke with patient's daughter-in-law at length today using blue phone, patient lives with her and is non-verbal at baseline,  He is non ambulatory and they carry him to a recliner, she states he is at his baseline and  Has been this way since 2009, she states he always eats slowly takes about an hour to eat, she states she feels like he looks better than usual today     Diabetes mellitus ([address])  Assessment & Plan  Lab Results   Component Value Date    HGBA1C 7.3 (H) 08/26/2020     Results from last 7 days   Lab Units 10/19/20  0443   GLUCOSE RANDOM mg/dL 119     · Diabetes mellitus with hyperglycemia. Hold metformin while hospitalized.     · Patient was added 10 units qHS while here   · Of note, A1c is at goal per last A1c given age and will likely not need insulin upon discharge   · Diabetic diet   · SSI   · Monitor for any hypoglycemia     Constipation-resolved as of 10/19/2020  Assessment & Plan  · Severe constipation with fecal impaction on admission with multiple bowel movements over the weekend   · Continue bowel regimen       GERD (gastroesophageal reflux disease)  Assessment & Plan  · GERD continue PPI    Hypernatremia-resolved as of 10/19/2020  Assessment & Plan  · Nephrology following  · Na stable   · Off IVF     Hypercalcemia  Assessment & Plan  10.3   Check labs in AM     Anemia  Assessment & Plan  In setting of CKD   Will need workup as outpatient with PCP   No evidence of active bleeding     Lab Results   Component Value Date    HGB 9.2 (L) 10/19/2020    HGB 8.7 (L) 10/18/2020    HGB 8.2 (L) 10/17/2020    HGB 9.0 (L) 10/16/2020    HGB 9.4 (L) 10/15/2020         CKD (chronic kidney disease) stage 3, GFR 30-59 ml/min  Assessment & Plan  Lab Results   Component Value Date    EGFR 49 10/19/2020 EGFR 50 10/18/2020    EGFR 42 10/17/2020    CREATININE 1.43 (H) 10/19/2020    CREATININE 1.41 (H) 10/18/2020    CREATININE 1.61 (H) 10/17/2020     DREW resolved   Continue to monitor renal function       VTE Pharmacologic Prophylaxis:   Pharmacologic: Heparin  Mechanical VTE Prophylaxis in Place: Yes    Patient Centered Rounds: I have performed bedside rounds with nursing staff today. Discussions with Specialists or Other Care Team Provider: nephrology, case management     Education and Discussions with Family / Patient: patient and nursing at the bedside, called son but he is Nepali speaeking only, daughter in law at the bedside spoke with her at length using blue phone and reviewed plan of care and hospital course to date and answered all questions     Time Spent for Care: 20 minutes. More than 50% of total time spent on counseling and coordination of care as described above. Current Length of Stay: 4 day(s)    Current Patient Status: Inpatient   Certification Statement: The patient will continue to require additional inpatient hospital stay due to sepsis, bacteremia, UTI, on IV abx     Discharge Plan: pending clinical course     Code Status: Level 1 - Full Code      Subjective:   ROS is limited due to patient's dementia. He is Nepali speaking only and unable to use blue phone effectively. Rounded with nursing at the bedside. He says "mariano". Minimal communication. hadmultiple bowel movements over the weekend per nursing. He is incontinent     I spoke with daughter in law using blue phone interpretation services at the bedside. Patient lives with her and she is one of his primary caregivers. She states he is nonverbal and non-ambulatory since 2009. She states he is at his baseline and actually states "I think he feels better today". She states they bathe and shave him and perform all his ADLs and then carry him to the recliner. He does not walk.  She states he eats very slowly, takes about an hour and chews slowly. Objective:     Vitals:   Temp (24hrs), Av.1 °F (36.7 °C), Min:97.9 °F (36.6 °C), Max:98.2 °F (36.8 °C)    Temp:  [97.9 °F (36.6 °C)-98.2 °F (36.8 °C)] 97.9 °F (36.6 °C)  HR:  [63-73] 73  Resp:  [16-18] 16  BP: (140-196)/(66-88) 182/86  SpO2:  [98 %-99 %] 99 %  Body mass index is 21.06 kg/m². Input and Output Summary (last 24 hours): Intake/Output Summary (Last 24 hours) at 10/19/2020 1242  Last data filed at 10/19/2020 5436  Gross per 24 hour   Intake 300 ml   Output --   Net 300 ml       Physical Exam:     Physical Exam  Vitals signs and nursing note reviewed. Constitutional:       Comments: Frail,  elderly male, appears chronically ill    HENT:      Head: Normocephalic. Eyes:      Pupils: Pupils are equal, round, and reactive to light. Cardiovascular:      Rate and Rhythm: Normal rate and regular rhythm. Pulmonary:      Effort: Pulmonary effort is normal. No respiratory distress. Breath sounds: Normal breath sounds. No wheezing or rhonchi. Comments: Decreased breath sounds throughout, on room air  Abdominal:      General: Bowel sounds are normal.      Palpations: Abdomen is soft. Tenderness: There is no abdominal tenderness. Musculoskeletal:      Right lower leg: No edema. Left lower leg: No edema. Skin:     General: Skin is warm. Neurological:      Mental Status: He is alert. Comments: Difficult to obtain neuro exam due to dementia, says "mariano", minimal speech, unable to use blue phone    Psychiatric:         Cognition and Memory: Cognition is impaired.          Additional Data:     Labs:    Results from last 7 days   Lab Units 10/19/20  0443   WBC Thousand/uL 10.61*   HEMOGLOBIN g/dL 9.2*   HEMATOCRIT % 30.9*   PLATELETS Thousands/uL 234   NEUTROS PCT % 67   LYMPHS PCT % 24   MONOS PCT % 6   EOS PCT % 3     Results from last 7 days   Lab Units 10/19/20  0443   SODIUM mmol/L 138   POTASSIUM mmol/L 4.1   CHLORIDE mmol/L 104   CO2 mmol/L 24 BUN mg/dL 19   CREATININE mg/dL 1.43*   ANION GAP mmol/L 10   CALCIUM mg/dL 9.1   ALBUMIN g/dL 2.5*   TOTAL BILIRUBIN mg/dL 0.32   ALK PHOS U/L 102   ALT U/L 59   AST U/L 76*   GLUCOSE RANDOM mg/dL 119     Results from last 7 days   Lab Units 10/15/20  2100   INR  1.22*     Results from last 7 days   Lab Units 10/19/20  1053 10/19/20  0755 10/18/20  2053 10/18/20  1544 10/18/20  1056 10/18/20  0711 10/17/20  2100 10/17/20  1601 10/17/20  1124 10/17/20  0725 10/16/20  2053 10/16/20  1543   POC GLUCOSE mg/dl 178* 116 171* 166* 224* 192* 183* 300* 179* 158* 168* 218*         Results from last 7 days   Lab Units 10/17/20  0728 10/16/20  0502 10/15/20  2300 10/15/20  2100   LACTIC ACID mmol/L  --  1.4 3.3* 3.4*   PROCALCITONIN ng/ml 0.18 0.43*  --  0.53*           * I Have Reviewed All Lab Data Listed Above. * Additional Pertinent Lab Tests Reviewed: All Labs Within Last 24 Hours Reviewed    Imaging:    Imaging Reports Reviewed Today Include: reviewed   Imaging Personally Reviewed by Myself Includes:  Pending echo     Recent Cultures (last 7 days):     Results from last 7 days   Lab Units 10/18/20  1627 10/18/20  1626 10/16/20  0435 10/15/20  2302 10/15/20  2105 10/15/20  2055   BLOOD CULTURE  Received in Microbiology Lab. Culture in Progress. Received in Microbiology Lab. Culture in Progress. --   --  No Growth at 72 hrs.  Methicillin Resistant Staphylococcus aureus*   GRAM STAIN RESULT   --   --   --   --   --  Gram positive cocci in clusters*   URINE CULTURE   --   --   --  >100,000 cfu/ml Methicillin Resistant Staphylococcus aureus*  --   --    LEGIONELLA URINARY ANTIGEN   --   --  Negative  --   --   --        Last 24 Hours Medication List:   Current Facility-Administered Medications   Medication Dose Route Frequency Provider Last Rate   • acetaminophen  650 mg Oral Q6H PRN Myra Sender, DO     • [START ON 10/20/2020] amLODIPine  2.5 mg Oral Daily Krysta Dubon PA-C     • atorvastatin  40 mg Oral Daily With Dinner Yamile Schroeder, DO     • carbidopa-levodopa  1 tablet Oral TID Yamile Schroeder, DO     • heparin (porcine)  5,000 Units Subcutaneous [facility] Young Medel, DO     • insulin glargine  10 Units Subcutaneous HS Zelda Cantrell, DO     • insulin lispro  1-6 Units Subcutaneous 4x Daily (AC & HS) Yamile Schroeder, DO     • levalbuterol  1.25 mg Nebulization Q6H PRN Yamile Schroeder DO     • ondansetron  4 mg Intravenous Q6H PRN Yamile Schroeder DO     • pantoprazole  40 mg Oral Early Morning Yamile Schroeder DO     • senna-docusate sodium  1 tablet Oral BID Zelda Cantrell, DO     • traZODone  150 mg Oral HS Yamile Schroeder DO     • vancomycin  20 mg/kg Intravenous Q24H Carmen Almanza MD 1,250 mg (10/19/20 0615)        Today, Patient Was Seen By: Ilia Ivory PA-C    ** Please Note: Dictation voice to text software may have been used in the creation of this document.  **

## 2020-10-19 NOTE — ASSESSMENT & PLAN NOTE
1 out of 2 blood cultures positive on admission growing MRSA   Urine culture growing the same   Repeat blood cultures pending   Echo obtained, pending formal read   Follow hemodynamics, fever curve, trend WBC  Follow cultures

## 2020-10-19 NOTE — SPEECH THERAPY NOTE
Speech Language/Pathology    Speech/Language Pathology Progress Note    Patient Name: Kortney Foster  Today's Date: 10/19/2020     Problem List  Principal Problem:    Sepsis ([address])  Active Problems:    Hyperlipidemia    Hypertension    Parkinson disease ([address])    GERD (gastroesophageal reflux disease)    Diabetes mellitus ([address])    MRSA bacteremia    UTI (urinary tract infection)    Hypercalcemia    Anemia    CKD (chronic kidney disease) stage 3, GFR 30-59 ml/min       Past Medical History  Past Medical History:   Diagnosis Date   • Alzheimer disease ([address])    • Ambulatory dysfunction    • CAD (coronary artery disease)    • CHF (congestive heart failure) ([facility])    • Constipation    • Dementia ([facility])    • Diabetes mellitus ([address])    • GERD (gastroesophageal reflux disease)    • Hyperlipidemia    • Hypertension    • NSTEMI (non-ST elevated myocardial infarction) ([address])    • Parkinson disease ([address])         Past Surgical History  Past Surgical History:   Procedure Laterality Date   • BACK SURGERY           Subjective:  Pt seen at lunch time w/ family member present, feeding pt. Pt seated more upright. Objective:  Seen for po tolerance and further recommendations. Nurse reported pt ate most of his breakfast but refused lunch. The pt was eating for family. Had a small amt of mashed potatoes w/ adequate transfer and swallow response. Mastication of the peaches was perseverative but wfl. Transfer was wfl. No  Obvious oral residue. No cough w/ mixed texture. Family deny that the pt is having any difficulty w/ PO. Wanted to know if he could go home today. Spoke w/ nurse. Pt last seen by me 10/21/19 w/ previous vbs's at [facility] noted in report). Noted lower chest clear on CT and nad on cxr. Assessment:  Appears to be tolerating the level 2 diet w/ thin liquids wnl. Plan/Recommendations:  D/c ST.

## 2020-10-19 NOTE — ASSESSMENT & PLAN NOTE
Lab Results   Component Value Date    HGBA1C 7.3 (H) 08/26/2020     Results from last 7 days   Lab Units 10/19/20  0443   GLUCOSE RANDOM mg/dL 119     · Diabetes mellitus with hyperglycemia. Hold metformin while hospitalized.     · Patient was added 10 units qHS while here   · Of note, A1c is at goal per last A1c given age and will likely not need insulin upon discharge   · Diabetic diet   · SSI   · Monitor for any hypoglycemia

## 2020-10-19 NOTE — ASSESSMENT & PLAN NOTE
· DREW likely secondary to poor intake with lisinopril use  · Hold lisinopril. · Hold metformin. · Continue to trend.   Appears back to baseline   · Nephrology following, spoke with Dr. Jie Lake today   · Monitoring off IVF hydration     Results from last 7 days   Lab Units 10/19/20  0443 10/18/20  0907 10/17/20  0728 10/16/20  0502 10/15/20  2100   BUN mg/dL 19 25 42* 68* 70*   CREATININE mg/dL 1.43* 1.41* 1.61* 2.40* 2.86*

## 2020-10-19 NOTE — ASSESSMENT & PLAN NOTE
· Severe constipation with fecal impaction on admission with multiple bowel movements over the weekend   · Continue bowel regimen

## 2020-10-20 LAB
ANION GAP SERPL CALCULATED.3IONS-SCNC: 8 MMOL/L (ref 4–13)
BASOPHILS # BLD AUTO: 0.04 THOUSANDS/ÂΜL (ref 0–0.1)
BASOPHILS NFR BLD AUTO: 1 % (ref 0–1)
BUN SERPL-MCNC: 18 MG/DL (ref 5–25)
CALCIUM SERPL-MCNC: 9.1 MG/DL (ref 8.3–10.1)
CHLORIDE SERPL-SCNC: 103 MMOL/L (ref 100–108)
CO2 SERPL-SCNC: 25 MMOL/L (ref 21–32)
CREAT SERPL-MCNC: 1.18 MG/DL (ref 0.6–1.3)
EOSINOPHIL # BLD AUTO: 0.33 THOUSAND/ÂΜL (ref 0–0.61)
EOSINOPHIL NFR BLD AUTO: 4 % (ref 0–6)
ERYTHROCYTE [DISTWIDTH] IN BLOOD BY AUTOMATED COUNT: 11.5 % (ref 11.6–15.1)
GFR SERPL CREATININE-BSD FRML MDRD: 62 ML/MIN/1.73SQ M
GLUCOSE SERPL-MCNC: 105 MG/DL (ref 65–140)
GLUCOSE SERPL-MCNC: 109 MG/DL (ref 65–140)
GLUCOSE SERPL-MCNC: 233 MG/DL (ref 65–140)
GLUCOSE SERPL-MCNC: 254 MG/DL (ref 65–140)
GLUCOSE SERPL-MCNC: 94 MG/DL (ref 65–140)
HCT VFR BLD AUTO: 31.2 % (ref 36.5–49.3)
HGB BLD-MCNC: 9.6 G/DL (ref 12–17)
IMM GRANULOCYTES # BLD AUTO: 0.03 THOUSAND/UL (ref 0–0.2)
IMM GRANULOCYTES NFR BLD AUTO: 0 % (ref 0–2)
LYMPHOCYTES # BLD AUTO: 2.08 THOUSANDS/ÂΜL (ref 0.6–4.47)
LYMPHOCYTES NFR BLD AUTO: 25 % (ref 14–44)
MCH RBC QN AUTO: 28.8 PG (ref 26.8–34.3)
MCHC RBC AUTO-ENTMCNC: 30.8 G/DL (ref 31.4–37.4)
MCV RBC AUTO: 94 FL (ref 82–98)
MONOCYTES # BLD AUTO: 0.51 THOUSAND/ÂΜL (ref 0.17–1.22)
MONOCYTES NFR BLD AUTO: 6 % (ref 4–12)
NEUTROPHILS # BLD AUTO: 5.32 THOUSANDS/ÂΜL (ref 1.85–7.62)
NEUTS SEG NFR BLD AUTO: 64 % (ref 43–75)
NRBC BLD AUTO-RTO: 0 /100 WBCS
PLATELET # BLD AUTO: 285 THOUSANDS/UL (ref 149–390)
PMV BLD AUTO: 11.1 FL (ref 8.9–12.7)
POTASSIUM SERPL-SCNC: 5.2 MMOL/L (ref 3.5–5.3)
RBC # BLD AUTO: 3.33 MILLION/UL (ref 3.88–5.62)
SODIUM SERPL-SCNC: 136 MMOL/L (ref 136–145)
WBC # BLD AUTO: 8.31 THOUSAND/UL (ref 4.31–10.16)

## 2020-10-20 PROCEDURE — 99232 SBSQ HOSP IP/OBS MODERATE 35: CPT | Performed by: INTERNAL MEDICINE

## 2020-10-20 PROCEDURE — 82948 REAGENT STRIP/BLOOD GLUCOSE: CPT

## 2020-10-20 PROCEDURE — 99232 SBSQ HOSP IP/OBS MODERATE 35: CPT | Performed by: PHYSICIAN ASSISTANT

## 2020-10-20 PROCEDURE — 80048 BASIC METABOLIC PNL TOTAL CA: CPT | Performed by: PHYSICIAN ASSISTANT

## 2020-10-20 PROCEDURE — 85025 COMPLETE CBC W/AUTO DIFF WBC: CPT | Performed by: PHYSICIAN ASSISTANT

## 2020-10-20 RX ORDER — HYDRALAZINE HYDROCHLORIDE 20 MG/ML
5 INJECTION INTRAMUSCULAR; INTRAVENOUS EVERY 6 HOURS PRN
Status: DISCONTINUED | OUTPATIENT
Start: 2020-10-20 | End: 2020-10-27 | Stop reason: HOSPADM

## 2020-10-20 RX ORDER — AMLODIPINE BESYLATE 5 MG/1
5 TABLET ORAL DAILY
Status: DISCONTINUED | OUTPATIENT
Start: 2020-10-21 | End: 2020-10-27 | Stop reason: HOSPADM

## 2020-10-20 RX ADMIN — INSULIN LISPRO 3 UNITS: 100 INJECTION, SOLUTION INTRAVENOUS; SUBCUTANEOUS at 21:06

## 2020-10-20 RX ADMIN — HEPARIN SODIUM 5000 UNITS: 5000 INJECTION INTRAVENOUS; SUBCUTANEOUS at 21:07

## 2020-10-20 RX ADMIN — DOCUSATE SODIUM AND SENNOSIDES 1 TABLET: 8.6; 5 TABLET, FILM COATED ORAL at 18:20

## 2020-10-20 RX ADMIN — CARBIDOPA AND LEVODOPA 1 TABLET: 25; 100 TABLET ORAL at 17:02

## 2020-10-20 RX ADMIN — ATORVASTATIN CALCIUM 40 MG: 40 TABLET, FILM COATED ORAL at 17:02

## 2020-10-20 RX ADMIN — VANCOMYCIN HYDROCHLORIDE 1250 MG: 5 INJECTION, POWDER, LYOPHILIZED, FOR SOLUTION INTRAVENOUS at 06:13

## 2020-10-20 RX ADMIN — PANTOPRAZOLE SODIUM 40 MG: 40 TABLET, DELAYED RELEASE ORAL at 08:12

## 2020-10-20 RX ADMIN — HEPARIN SODIUM 5000 UNITS: 5000 INJECTION INTRAVENOUS; SUBCUTANEOUS at 13:11

## 2020-10-20 RX ADMIN — CARBIDOPA AND LEVODOPA 1 TABLET: 25; 100 TABLET ORAL at 08:11

## 2020-10-20 RX ADMIN — DOCUSATE SODIUM AND SENNOSIDES 1 TABLET: 8.6; 5 TABLET, FILM COATED ORAL at 08:12

## 2020-10-20 RX ADMIN — HEPARIN SODIUM 5000 UNITS: 5000 INJECTION INTRAVENOUS; SUBCUTANEOUS at 06:13

## 2020-10-20 RX ADMIN — AMLODIPINE BESYLATE 2.5 MG: 2.5 TABLET ORAL at 08:12

## 2020-10-20 RX ADMIN — INSULIN LISPRO 3 UNITS: 100 INJECTION, SOLUTION INTRAVENOUS; SUBCUTANEOUS at 16:57

## 2020-10-20 RX ADMIN — INSULIN GLARGINE 10 UNITS: 100 INJECTION, SOLUTION SUBCUTANEOUS at 21:06

## 2020-10-20 NOTE — ASSESSMENT & PLAN NOTE
· Lisinopril held on admission given DREW  · Nephrology recommending NOT restarting ACE-I on discharge   · Started on amlodipine, increased to 5 mg once daily   · Monitor VS trend

## 2020-10-20 NOTE — PROGRESS NOTES
Vancomycin IV Pharmacy-to-Dose Consultation    Kerry Johns is a 69 y.o. male who is currently receiving Vancomycin IV with management by the Pharmacy Consult service. Assessment/Plan:  The patient was reviewed. Renal function is improving today and no signs or symptoms of nephrotoxicity and/or infusion reactions were documented in the chart. Based on today’s assessment, continue current vancomycin (day # 3) dosing of 1250 mg IV q 24h, with a plan for trough to be drawn at 0600 on 10/21/20. We will continue to follow the patient’s culture results and clinical progress daily.     Alvin Valle, Pharmacist

## 2020-10-20 NOTE — UTILIZATION REVIEW
Continued Stay Review    Date:  10/20/20                          Current Patient Class:   INpatient  Current Level of Care:  Med surg    HPI:70 y.o. male initially admitted on   10/15  With   Sepsis due to pneumonia/UTI     10/18  ID consult  Blood cultures have grown staphylococcus aureus, likely  MRSA bases on urine culture report. Has clinically  Improved. Will discontinue rocephin, flagyl and   Azithromycin and  Continue   IV  Vanco.      Assessment/Plan:   10/20    Continue  IV  Vanco, repeat  Blood cultures  Negative. Will need  4 weeks  MAJO if repeat  Blood cultures remain negative. Continue to follow labs. Waiting   2 de reading. Remains  Off  IVF. Per speech eval, continue  Level 2 diet  With thin liquids. Follow  Renal function.     Pertinent Labs/Diagnostic Results:   Results from last 7 days   Lab Units 10/15/20  2134   SARS-COV-2  Negative     Results from last 7 days   Lab Units 10/20/20  0516 10/19/20  0443 10/18/20  0907 10/17/20  0728 10/16/20  0502   WBC Thousand/uL 8.31 10.61* 11.15* 12.76* 17.23*   HEMOGLOBIN g/dL 9.6* 9.2* 8.7* 8.2* 9.0*   HEMATOCRIT % 31.2* 30.9* 30.0* 27.7* 31.0*   PLATELETS Thousands/uL 285 234 211 242 283   NEUTROS ABS Thousands/µL 5.32 7.05 8.03* 9.59*  --          Results from last 7 days   Lab Units 10/20/20  0516 10/19/20  0443 10/18/20  0907 10/17/20  0728 10/16/20  0502   SODIUM mmol/L 136 138 136 148* 146*   POTASSIUM mmol/L 5.2 4.1 3.8 4.1 5.0   CHLORIDE mmol/L 103 104 105 112* 114*   CO2 mmol/L 25 24 24 26 19*   ANION GAP mmol/L 8 10 7 10 13   BUN mg/dL 18 19 25 42* 68*   CREATININE mg/dL 1.18 1.43* 1.41* 1.61* 2.40*   EGFR ml/min/1.73sq m 62 49 50 42 26   CALCIUM mg/dL 9.1 9.1 8.5 9.0 9.2   MAGNESIUM mg/dL  --   --   --  1.7  --      Results from last 7 days   Lab Units 10/19/20  0443 10/18/20  0907 10/16/20  0502 10/15/20  2100   AST U/L 76* 87* 89* 130*   ALT U/L 59 87* 150* 133*   ALK PHOS U/L 102 93 106 117*   TOTAL PROTEIN g/dL 8.0 7.3 8.4* 9.1* ALBUMIN g/dL 2.5* 2.2* 2.5* 2.8*   TOTAL BILIRUBIN mg/dL 0.32 0.19* 0.20 0.24     Results from last 7 days   Lab Units 10/20/20  1128 10/20/20  0724 10/19/20  2107 10/19/20  1555 10/19/20  1053 10/19/20  0755 10/18/20  2053 10/18/20  1544 10/18/20  1056 10/18/20  0711 10/17/20  2100 10/17/20  1601   POC GLUCOSE mg/dl 105 94 108 170* 178* 116 171* 166* 224* 192* 183* 300*     Results from last 7 days   Lab Units 10/20/20  0516 10/19/20  0443 10/18/20  0907 10/17/20  0728 10/16/20  0502 10/15/20  2100   GLUCOSE RANDOM mg/dL 109 119 220* 170* 227* 284*         Results from last 7 days   Lab Units 10/19/20  0443   HEMOGLOBIN A1C % 7.7*   EAG mg/dl 174                      Results from last 7 days   Lab Units 10/17/20  0728 10/16/20  0502 10/15/20  2100   PROCALCITONIN ng/ml 0.18 0.43* 0.53*     Results from last 7 days   Lab Units 10/16/20  0502 10/15/20  2300 10/15/20  2100   LACTIC ACID mmol/L 1.4 3.3* 3.4*                   Results from last 7 days   Lab Units 10/16/20  0502   HEP B S AG  Non-reactive   HEP C AB  Non-reactive   HEP B C IGM  Non-reactive   HEP B C TOTAL AB  Reactive*                 Results from last 7 days   Lab Units 10/16/20  0435   STREP PNEUMONIAE ANTIGEN, URINE  Negative   LEGIONELLA URINARY ANTIGEN  Negative           Results from last 7 days   Lab Units 10/18/20  1627 10/18/20  1626 10/15/20  2302 10/15/20  2105 10/15/20  2055   BLOOD CULTURE  No Growth at 24 hrs. No Growth at 24 hrs.  --  No Growth After 4 Days.  Methicillin Resistant Staphylococcus aureus*   GRAM STAIN RESULT   --   --   --   --  Gram positive cocci in clusters*   URINE CULTURE   --   --  >100,000 cfu/ml Methicillin Resistant Staphylococcus aureus*  --   --                Vital Signs:   97.2 °F (36.2 °C)Abnormal     66   16   168/82   100 %   None (Room air)          Medications:   Scheduled Medications:  [START ON 10/21/2020] amLODIPine, 5 mg, Oral, Daily  atorvastatin, 40 mg, Oral, Daily With Dinner  carbidopa-levodopa, 1 tablet, Oral, TID  heparin (porcine), 5,000 Units, Subcutaneous, Q8H [address]  insulin glargine, 10 Units, Subcutaneous, HS  insulin lispro, 1-6 Units, Subcutaneous, 4x Daily (AC & HS)  pantoprazole, 40 mg, Oral, Early Morning  senna-docusate sodium, 1 tablet, Oral, BID  traZODone, 150 mg, Oral, HS  vancomycin, 20 mg/kg, Intravenous, Q24H      Continuous IV Infusions:     PRN Meds:  acetaminophen, 650 mg, Oral, Q6H PRN  hydrALAZINE, 5 mg, Intravenous, Q6H PRN  levalbuterol, 1.25 mg, Nebulization, Q6H PRN  ondansetron, 4 mg, Intravenous, Q6H PRN        Discharge Plan:    D    Network Utilization Review Department  [email or web address]  ATTENTION: Please call with any questions or concerns to [phone] and carefully listen to the prompts so that you are directed to the right person. All voicemails are confidential.  Ekta Arroyo all requests for admission clinical reviews, approved or denied determinations and any other requests to dedicated fax number below belonging to the campus where the patient is receiving treatment.  List of dedicated fax numbers for the Facilities:  FACILITY NAME UR FAX NUMBER   ADMISSION DENIALS (Administrative/Medical Necessity) [phone]   [address] [facility] (Maternity/NICU/Pediatrics) [phone]   [address] [facility] [phone]   [facility] [phone] [address] [facility] [phone]   [facility] [phone]   [address] [place] [address] [address] [facility] [phone]   [address] [place] [address] [phone]   Pending sale to [facility] [phone]   [address] [facility] [address] [facility] [phone]

## 2020-10-20 NOTE — ASSESSMENT & PLAN NOTE
In setting of CKD   Will need workup as outpatient with PCP   No evidence of active bleeding     Lab Results   Component Value Date    HGB 9.6 (L) 10/20/2020    HGB 9.2 (L) 10/19/2020    HGB 8.7 (L) 10/18/2020    HGB 8.2 (L) 10/17/2020    HGB 9.0 (L) 10/16/2020

## 2020-10-20 NOTE — PROGRESS NOTES
NEPHROLOGY PROGRESS NOTE   Dori Lopesorel 69 y.o. male MRN: [id number]  Unit/Bed#: E2 -01 Encounter: [id number]      ASSESSMENT & PLAN    1. Acute Kidney Injury was present on admission and this has resolved  ? Etiology was likely pre renal azotemia with a component of ATN while Ace inhibition  ? ACE-inhibitor on hold and received IV fluids and creatinine improved now to baseline  ? Will monitor off IV fluids  ? Monitor antibiotic levels pharmacy  ? Medications otherwise appropriately dosed  ? Urinalysis shows a specific gravity 1.025, 2+ proteinuria this should be worked up as an outpatient  ? CT of the abdomen and pelvis on October 15th does show extensive renal vascular calcification but no evidence of nephrolithiasis or obstructive uropathy  2. Electrolytes:  ? Electrolytes currently acceptable  3. Acid/Base:  ? Now anion gap and bicarb stable  4. BP/HR:  ? Hypertension-restarted amlodipine increased to 5 mg starting tomorrow  ? P.r.n. Hydralazine for systolic blood pressures above 180  5. Volume Status  ? Appears euvolemic  6. Anemia  ? Normocytic anemia in the setting of chronic kidney disease  ? Should have workup done  ? Will hold on checking iron stores in the setting of being treated for infection  7. MBD  ? Check phosphorus and PTH as an outpatient  8. Health Maintanance/Risk Reduction  ?  Sepsis secondary to pneumonia UTI-has a staph aureus bacteremia-being treated with vancomycin      Discussed with slim    SUBJECTIVE:    No acute events overnight remains on antibiotic  Renal function stable    OBJECTIVE:  Current Weight: Weight - Scale: 62.3 kg (137 lb 5.6 oz)  Vitals:    10/20/20 0737   BP: 168/82   Pulse: 66   Resp: 16   Temp: (!) 97.2 °F (36.2 °C)   SpO2: 100%     No intake or output data in the 24 hours ending 10/20/20 1219  Weight (last 2 days)     Date/Time   Weight    10/20/20 0600   62.3 (137.35)    10/18/20 0559   61 (134.48)              General:  Frail cachectic, no acute distress  Eyes: conjunctivae pink, anicteric sclerae  ENT: lips and mucous membranes moist  Neck: supple, no JVD  Chest: clear breath sounds bilateral, no crackles, ronchus or wheezings  CVS: distinct S1 & S2, normal rate, regular rhythm  Abdomen: soft, non-tender, non-distended, normoactive bowel sounds  Extremities: no edema of both legs  Skin: no rash  Neuro:  Ukrainian-speaking limited speaking      Medications:    Current Facility-Administered Medications:   •  acetaminophen (TYLENOL) tablet 650 mg, 650 mg, Oral, Q6H PRN, Stephanie Boas, DO  •  amLODIPine (NORVASC) tablet 2.5 mg, 2.5 mg, Oral, Daily, Krysta Dubon PA-C, 2.5 mg at 10/20/20 9335  •  atorvastatin (LIPITOR) tablet 40 mg, 40 mg, Oral, Daily With Dinner, Young Medel DO, 40 mg at 10/18/20 1620  •  carbidopa-levodopa (SINEMET)  mg per tablet 1 tablet, 1 tablet, Oral, TID, Stephanie Boas, DO, 1 tablet at 10/20/20 6002  •  heparin (porcine) subcutaneous injection 5,000 Units, 5,000 Units, Subcutaneous, Q8H [address], Stephanie Boas, DO, 5,000 Units at 10/20/20 0613  •  insulin glargine (LANTUS) subcutaneous injection 10 Units 0.1 mL, 10 Units, Subcutaneous, HS, Kavita Oates, , 10 Units at 10/19/20 2354  •  insulin lispro (HumaLOG) 100 units/mL subcutaneous injection 1-6 Units, 1-6 Units, Subcutaneous, 4x Daily (AC & HS), 1 Units at 10/19/20 1652 **AND** Fingerstick Glucose (POCT), , , 4x Daily AC and at bedtime, Stephanie Boas, DO  •  levalbuterol (XOPENEX) inhalation solution 1.25 mg, 1.25 mg, Nebulization, Q6H PRN, Augustine Medel, DO  •  ondansetron (ZOFRAN) injection 4 mg, 4 mg, Intravenous, Q6H PRN, Augustine Medel DO  •  pantoprazole (PROTONIX) EC tablet 40 mg, 40 mg, Oral, Early Morning, Young Medel DO, 40 mg at 10/20/20 7847  •  senna-docusate sodium (SENOKOT S) 8.6-50 mg per tablet 1 tablet, 1 tablet, Oral, BID, Kavita Oates DO, 1 tablet at 10/20/20 7949  •  traZODone (DESYREL) tablet 150 mg, 150 mg, Oral, HS, Young Medel DO, 150 mg at 10/19/20 3283  •  vancomycin (VANCOCIN) 1,250 mg in sodium chloride 0.9 % 250 mL IVPB, 20 mg/kg, Intravenous, Q24H, Sincere Stevenson MD, Stopped at 10/20/20 0745    Invasive Devices:      Lab Results:   Results from last 7 days   Lab Units 10/20/20  0516 10/19/20  0443 10/18/20  1627 10/18/20  1626 10/18/20  0907 10/17/20  0728 10/16/20  0502 10/15/20  2302 10/15/20  2105 10/15/20  2100  10/15/20  2055   WBC Thousand/uL 8.31 10.61*  --   --  11.15* 12.76* 17.23*  --   --  17.47*   < >  --    HEMOGLOBIN g/dL 9.6* 9.2*  --   --  8.7* 8.2* 9.0*  --   --  9.4*   < >  --    HEMATOCRIT % 31.2* 30.9*  --   --  30.0* 27.7* 31.0*  --   --  31.8*   < >  --    PLATELETS Thousands/uL 285 234  --   --  211 242 283  --   --  331   < >  --    POTASSIUM mmol/L 5.2 4.1  --   --  3.8 4.1 5.0  --   --  5.1   < >  --    CHLORIDE mmol/L 103 104  --   --  105 112* 114*  --   --  110*   < >  --    CO2 mmol/L 25 24  --   --  24 26 19*  --   --  23   < >  --    BUN mg/dL 18 19  --   --  25 42* 68*  --   --  70*   < >  --    CREATININE mg/dL 1.18 1.43*  --   --  1.41* 1.61* 2.40*  --   --  2.86*   < >  --    CALCIUM mg/dL 9.1 9.1  --   --  8.5 9.0 9.2  --   --  9.8   < >  --    MAGNESIUM mg/dL  --   --   --   --   --  1.7  --   --   --   --   --   --    ALK PHOS U/L  --  102  --   --  93  --  106  --   --  117*  --   --    ALT U/L  --  59  --   --  87*  --  150*  --   --  133*  --   --    AST U/L  --  76*  --   --  87*  --  89*  --   --  130*  --   --    BLOOD CULTURE   --   --  No Growth at 24 hrs. No Growth at 24 hrs.  --   --   --   --  No Growth After 4 Days. --   --  Methicillin Resistant Staphylococcus aureus*   LEUKOCYTES UA   --   --   --   --   --   --   --  Large*  --   --   --   --    BLOOD UA   --   --   --   --   --   --   --  Small*  --   --   --   --     < > = values in this interval not displayed.

## 2020-10-20 NOTE — ASSESSMENT & PLAN NOTE
· Parkinson's dementia not much verbal at baseline and essentially bed/wheelchair bound  · Continue sinemet 25/100 t.i.d.  · speech eval - level 2 with thin liquids   · Spoke with patient's daughter-in-law at length using blue phone, patient lives with her and son and is non-verbal at baseline,  He is non ambulatory and they carry him to a recliner, she states he is at his baseline and  Has been this way since 2009, she states he always eats slowly takes about an hour to eat

## 2020-10-20 NOTE — PROGRESS NOTES
Progress Note - Natalya Nguyen Jennsunilson 1950, 69 y.o. male MRN: [id number]  Unit/Bed#: E2 -01 Encounter: [id number]  Primary Care Provider: Juli Haley MD   Date and time admitted to hospital: 10/15/2020  8:46 PM    * Sepsis [facility])  Assessment & Plan  · Sepsis POA thought to be secondary to pneumonia originally however original blood cultures on 10/15 1 out of 2 growing MRSA and urine culture growing the same   · Infectious Disease consulted   · Switched to IV vancomycin given above   · Pharmacy consult for vanc dosing   · Pending repeat blood cultures obtained on 10/18 negative x 24 hours   · Will need to place PICC with 4 weeks IV antibiotics once blood cultures negative x 72 hours   · Will need to obtain consent from family member once present at bedside   · Continue to follow cultures       MRSA bacteremia  Assessment & Plan  1 out of 2 blood cultures positive on admission growing MRSA   Urine culture growing the same   Repeat blood cultures negative x 24 hours   Will need PICC placement once blood cultures negative x 72 hours for 4 weeks of IV antibiotics through 11/14   Echo: no vegetation/endocarditis   Follow hemodynamics, fever curve, trend WBC  Follow cultures       UTI (urinary tract infection)  Assessment & Plan  MRSA UTI   Continue IV vancomycin    Follow cultures   See plan above     Hyperlipidemia  Assessment & Plan  · Hyperlipidemia.     · Substitute crestor with atorvastatin based on hospital formulary    Hypertension  Assessment & Plan  · Lisinopril on hold given DREW  · Nephrology recommending NOT restarting on discharge   · Started on amlodipine, increased to 5 mg once daily   · Monitor VS trend     Parkinson disease ([address])  Assessment & Plan  · Parkinson's dementia not much verbal at baseline and essentially bed/wheelchair bound  · Continue sinemet 25/100 t.i.d.  · speech eval - level 2 with thin liquids   · Spoke with patient's daughter-in-law at length using blue phone, patient lives with her and is non-verbal at baseline,  He is non ambulatory and they carry him to a recliner, she states he is at his baseline and  Has been this way since 2009, she states he always eats slowly takes about an hour to eat    Diabetes mellitus [facility])  Assessment & Plan  Lab Results   Component Value Date    HGBA1C 7.3 (H) 08/26/2020     Results from last 7 days   Lab Units 10/19/20  0443   GLUCOSE RANDOM mg/dL 119     · Diabetes mellitus with hyperglycemia. Hold metformin while hospitalized. · Patient was added 10 units qHS while here   · Of note, A1c is at goal per last A1c given age and will likely not need insulin upon discharge   · Diabetic diet   · SSI   · Monitor for any hypoglycemia     GERD (gastroesophageal reflux disease)  Assessment & Plan  · GERD continue PPI    Anemia  Assessment & Plan  In setting of CKD   Will need workup as outpatient with PCP   No evidence of active bleeding     Lab Results   Component Value Date    HGB 9.2 (L) 10/19/2020    HGB 8.7 (L) 10/18/2020    HGB 8.2 (L) 10/17/2020    HGB 9.0 (L) 10/16/2020    HGB 9.4 (L) 10/15/2020         CKD (chronic kidney disease) stage 3, GFR 30-59 ml/min  Assessment & Plan  Lab Results   Component Value Date    EGFR 49 10/19/2020    EGFR 50 10/18/2020    EGFR 42 10/17/2020    CREATININE 1.43 (H) 10/19/2020    CREATININE 1.41 (H) 10/18/2020    CREATININE 1.61 (H) 10/17/2020     DREW resolved   Continue to monitor renal function       VTE Pharmacologic Prophylaxis:   Pharmacologic: Heparin  Mechanical VTE Prophylaxis in Place: Yes    Patient Centered Rounds: I have performed bedside rounds with nursing staff today. Discussions with Specialists or Other Care Team Provider: infectious Disease, Nephrology     Time Spent for Care: 20 minutes. More than 50% of total time spent on counseling and coordination of care as described above.     Current Length of Stay: 5 day(s)    Current Patient Status: Inpatient   Certification Statement: The patient will continue to require additional inpatient hospital stay due to bacteremia     Discharge Plan: pending PICC placement with plan for long term abx     Code Status: Level 1 - Full Code      Subjective:   Patient is nonverbal. He is lying in bed without any evidence of acute distress. Objective:     Vitals:   Temp (24hrs), Av.9 °F (36.6 °C), Min:97.2 °F (36.2 °C), Max:98.5 °F (36.9 °C)    Temp:  [97.2 °F (36.2 °C)-98.5 °F (36.9 °C)] 97.2 °F (36.2 °C)  HR:  [66-86] 66  Resp:  [16] 16  BP: (168)/(69-82) 168/82  SpO2:  [100 %] 100 %  Body mass index is 21.51 kg/m². Input and Output Summary (last 24 hours):     No intake or output data in the 24 hours ending 10/20/20 1450    Physical Exam:     Physical Exam  Vitals signs and nursing note reviewed. Constitutional:       General: He is not in acute distress. Comments: Chronically ill, frail   HENT:      Head: Normocephalic. Cardiovascular:      Rate and Rhythm: Normal rate and regular rhythm. Pulmonary:      Effort: Pulmonary effort is normal.      Breath sounds: Normal breath sounds. Abdominal:      General: Bowel sounds are normal.      Palpations: Abdomen is soft. Genitourinary:     Comments: Incontinent of urine   Musculoskeletal:      Right lower leg: No edema. Left lower leg: No edema. Neurological:      Mental Status: He is alert. Comments: Nonverbal    Psychiatric:         Behavior: Behavior is not agitated or aggressive. Cognition and Memory: Cognition is impaired.          Additional Data:     Labs:    Results from last 7 days   Lab Units 10/20/20  0516   WBC Thousand/uL 8.31   HEMOGLOBIN g/dL 9.6*   HEMATOCRIT % 31.2*   PLATELETS Thousands/uL 285   NEUTROS PCT % 64   LYMPHS PCT % 25   MONOS PCT % 6   EOS PCT % 4     Results from last 7 days   Lab Units 10/20/20  0516 10/19/20  0443   SODIUM mmol/L 136 138   POTASSIUM mmol/L 5.2 4.1   CHLORIDE mmol/L 103 104   CO2 mmol/L 25 24   BUN mg/dL 18 19   CREATININE mg/dL 1.18 1.43*   ANION GAP mmol/L 8 10   CALCIUM mg/dL 9.1 9.1   ALBUMIN g/dL  --  2.5*   TOTAL BILIRUBIN mg/dL  --  0.32   ALK PHOS U/L  --  102   ALT U/L  --  59   AST U/L  --  76*   GLUCOSE RANDOM mg/dL 109 119     Results from last 7 days   Lab Units 10/15/20  2100   INR  1.22*     Results from last 7 days   Lab Units 10/20/20  1128 10/20/20  0724 10/19/20  2107 10/19/20  1555 10/19/20  1053 10/19/20  0755 10/18/20  2053 10/18/20  1544 10/18/20  1056 10/18/20  0711 10/17/20  2100 10/17/20  1601   POC GLUCOSE mg/dl 105 94 108 170* 178* 116 171* 166* 224* 192* 183* 300*     Results from last 7 days   Lab Units 10/19/20  0443   HEMOGLOBIN A1C % 7.7*     Results from last 7 days   Lab Units 10/17/20  0728 10/16/20  0502 10/15/20  2300 10/15/20  2100   LACTIC ACID mmol/L  --  1.4 3.3* 3.4*   PROCALCITONIN ng/ml 0.18 0.43*  --  0.53*       * I Have Reviewed All Lab Data Listed Above. * Additional Pertinent Lab Tests Reviewed: All Labs Within Last 24 Hours Reviewed    Imaging:    Imaging Reports Reviewed Today Include:   Imaging Personally Reviewed by Myself Includes:      Recent Cultures (last 7 days):     Results from last 7 days   Lab Units 10/18/20  1627 10/18/20  1626 10/16/20  0435 10/15/20  2302 10/15/20  2105 10/15/20  2055   BLOOD CULTURE  No Growth at 24 hrs. No Growth at 24 hrs.  --   --  No Growth After 4 Days.  Methicillin Resistant Staphylococcus aureus*   GRAM STAIN RESULT   --   --   --   --   --  Gram positive cocci in clusters*   URINE CULTURE   --   --   --  >100,000 cfu/ml Methicillin Resistant Staphylococcus aureus*  --   --    LEGIONELLA URINARY ANTIGEN   --   --  Negative  --   --   --        Last 24 Hours Medication List:   Current Facility-Administered Medications   Medication Dose Route Frequency Provider Last Rate   • acetaminophen  650 mg Oral Q6H PRN Viviana Wen DO     • [START ON 10/21/2020] amLODIPine  5 mg Oral Daily Kelvin Vazquez DO     • atorvastatin  40 mg Oral Daily With [employer] Gianfranco, DO     • carbidopa-levodopa  1 tablet Oral TID Eyad Boss, DO     • heparin (porcine)  5,000 Units Subcutaneous [facility] Gianfranco, DO     • hydrALAZINE  5 mg Intravenous Q6H PRN Theron Plush, DO     • insulin glargine  10 Units Subcutaneous HS La Shin, DO     • insulin lispro  1-6 Units Subcutaneous 4x Daily (AC & HS) Eyad Boss, DO     • levalbuterol  1.25 mg Nebulization Q6H PRN Eyad Boss, DO     • ondansetron  4 mg Intravenous Q6H PRN Eyad Boss, DO     • pantoprazole  40 mg Oral Early Morning Eyad Boss, DO     • senna-docusate sodium  1 tablet Oral BID Lasanam Shin, DO     • traZODone  150 mg Oral HS Eyad Boss, DO     • vancomycin  20 mg/kg Intravenous Q24H Noreen Lisa MD Stopped (10/20/20 0068)        Today, Patient Was Seen By: Yamilet Peoples PA-C    ** Please Note: Dictation voice to text software may have been used in the creation of this document.  **

## 2020-10-20 NOTE — ASSESSMENT & PLAN NOTE
· Sepsis POA thought to be secondary to pneumonia originally however original blood cultures on 10/15 1 out of 2 growing MRSA and urine culture growing the same   · Infectious Disease consulted   · Switched to IV vancomycin given above   · Pharmacy consult for vanc dosing   · repeat blood cultures obtained on 10/18 negative x 48 hours   · Will need to place PICC with 4 weeks IV antibiotics once blood cultures negative x 72 hours   · Will need to obtain consent from family member once present at bedside later today, Dr. Charles San notified  · Will place PICC order once blood cultures negative at 72 hours  · Continue to follow cultures

## 2020-10-20 NOTE — ASSESSMENT & PLAN NOTE
Lab Results   Component Value Date    EGFR 62 10/20/2020    EGFR 49 10/19/2020    EGFR 50 10/18/2020    CREATININE 1.18 10/20/2020    CREATININE 1.43 (H) 10/19/2020    CREATININE 1.41 (H) 10/18/2020     DREW resolved   Continue to monitor renal function

## 2020-10-20 NOTE — ASSESSMENT & PLAN NOTE
Lab Results   Component Value Date    HGBA1C 7.7 (H) 10/19/2020     Results from last 7 days   Lab Units 10/20/20  0516   GLUCOSE RANDOM mg/dL 109     · Diabetes mellitus with hyperglycemia. Hold metformin while hospitalized.     · Patient was added lantus 8 units qHS while here   · Of note, A1c is at goal per last A1c given age and will likely not need insulin upon discharge   · Diabetic diet   · SSI   · Monitor for any hypoglycemia

## 2020-10-20 NOTE — PROGRESS NOTES
Progress Note - Infectious Disease   Carlos Gar Dejesusmorel 69 y.o. male MRN: [id number]  Unit/Bed#: E2 -01 Encounter: [id number]    Impression/Plan:  1. Sepsis. POA.  Fever and leukocytosis.  Was initially felt to be secondary to an aspiration event with possible pneumonia, however blood cultures show MRSA bacteremia. Fortunately the patient has clinically improved. Fever curve and WBC count have trended down. His procalcitonin level has normalized. Repeat blood cultures are negative after 24 hours.  -antibiotic as below  -monitor CBC and BMP  -follow up repeat blood cultures  -monitor vitals  -supportive care     2. MRSA bacteremia. No definitive etiology although the patient's urine culture is also positive for MRSA.  As the patient does not have chronic indwelling Alford catheter in place, must consider the possibility of more of a disseminated process that went to the urine. Unclear if this is a true bacteremia as the patient clinically improved without any treatment for MRSA, however this will be difficult to ignore, especially since repeat blood cultures were not obtained prior to giving vancomycin. The patient is currently receiving IV vancomycin and appears to be tolerating antibiotic treatment without difficulty. Will continue for now. TTE was negative. Repeat blood cultures are negative after 24 hours. -continue IV vancomycin  -continue pharmacy consult for guidance on vancomycin dosage  -monitor CBC and BMP  -follow up repeat blood cultures  -monitor vitals  -wait to place PICC until repeat blood cultures are negative 72 hours  -PICC will need to be removed after final dose of IV antibiotics  -patient will require outpatient ID follow up after discharge, I will include the information in his discharge planning     3. Possible MRSA UTI.  More likely that this was a hematogenous process as the patient does not have chronic Alford catheter in place.  No complication seen on CT the abdomen pelvis.  -antibiotics as above  -monitor  symptoms  -monitor urine output  -no additional ID workup for now     4. Acute kidney injury. On chronic kidney disease.  Likely a pre renal issue.  Perhaps medications were playing a role.  Perhaps ATN was playing a role.  The renal function has improved with resuscitation. Creatinine has decreased to 1.18 today.  -monitor BMP  -dose adjust antibiotics for renal function as needed  -continue follow-up with Nephrology     5. Type 2 diabetes mellitus with hyperglycemia without long-term insulin use. Patient's last hemoglobin A1c was 7.7% on 10/19/2020. Elevated blood glucose is risk factor for infection. Recommend tighter glycemic control for overall health, especially in the setting of infection.  -blood glucose management per primary service     6. Dementia. Associated with Alzheimer's disease and Parkinson's disease.  -supportive care    Above plan was discussed in detail with Chel MULLIGAN PA-C. Antibiotics:  Vancomycin 3  Antibiotics 6    Subjective:  Unable to obtain ROS as patient is nonverbal with dementia. Objective:  Vitals:  Temp:  [97.2 °F (36.2 °C)-98.5 °F (36.9 °C)] 97.2 °F (36.2 °C)  HR:  [66-86] 66  Resp:  [16] 16  BP: (168)/(69-82) 168/82  SpO2:  [100 %] 100 %  Temp (24hrs), Av.9 °F (36.6 °C), Min:97.2 °F (36.2 °C), Max:98.5 °F (36.9 °C)  Current: Temperature: (!) 97.2 °F (36.2 °C)    Physical Exam:   General Appearance:  Sleepy, non verbal and minimally interactive, nontoxic, in no acute distress. He appears chronically debilitated. He appears comfortable supine in bed. Throat: Oropharynx moist without lesions. Lungs:   Clear to auscultation bilaterally; no wheezes, rhonchi or rales; respirations unlabored; patient is on room air   Heart:  RRR; no murmur, rub or gallop   Abdomen:   Soft, no facial grimace with palpation, non-distended, positive bowel sounds.      Extremities: No clubbing or cyanosis; trace pedal edema   Skin: No new rashes, lesions, or draining wounds noted on exposed skin. Labs, Imaging, & Other studies:   All pertinent labs and imaging studies were personally reviewed  Results from last 7 days   Lab Units 10/20/20  0516 10/19/20  0443 10/18/20  0907   WBC Thousand/uL 8.31 10.61* 11.15*   HEMOGLOBIN g/dL 9.6* 9.2* 8.7*   PLATELETS Thousands/uL 285 234 211     Results from last 7 days   Lab Units 10/20/20  0516 10/19/20  0443 10/18/20  0907  10/16/20  0502   POTASSIUM mmol/L 5.2 4.1 3.8   < > 5.0   CHLORIDE mmol/L 103 104 105   < > 114*   CO2 mmol/L 25 24 24   < > 19*   BUN mg/dL 18 19 25   < > 68*   CREATININE mg/dL 1.18 1.43* 1.41*   < > 2.40*   EGFR ml/min/1.73sq m 62 49 50   < > 26   CALCIUM mg/dL 9.1 9.1 8.5   < > 9.2   AST U/L  --  76* 87*  --  89*   ALT U/L  --  59 87*  --  150*   ALK PHOS U/L  --  102 93  --  106    < > = values in this interval not displayed. Results from last 7 days   Lab Units 10/18/20  1627 10/18/20  1626 10/16/20  0435 10/15/20  2302 10/15/20  2105 10/15/20  2055   BLOOD CULTURE  No Growth at 24 hrs. No Growth at 24 hrs.  --   --  No Growth After 4 Days.  Methicillin Resistant Staphylococcus aureus*   GRAM STAIN RESULT   --   --   --   --   --  Gram positive cocci in clusters*   URINE CULTURE   --   --   --  >100,000 cfu/ml Methicillin Resistant Staphylococcus aureus*  --   --    LEGIONELLA URINARY ANTIGEN   --   --  Negative  --   --   --      Results from last 7 days   Lab Units 10/17/20  0728 10/16/20  0502 10/15/20  2100   PROCALCITONIN ng/ml 0.18 0.43* 0.53*

## 2020-10-20 NOTE — PROGRESS NOTES
Spoke with interpretor for 235. His step daughter with whom he resides updated me on the flu and pneumonia vaccine. Also wanted to know if he is being discharged to her today. Told her when the doctors round we will be able to give her an update at that time.

## 2020-10-21 LAB
ANION GAP SERPL CALCULATED.3IONS-SCNC: 9 MMOL/L (ref 4–13)
BACTERIA BLD CULT: NORMAL
BUN SERPL-MCNC: 27 MG/DL (ref 5–25)
CALCIUM SERPL-MCNC: 9.1 MG/DL (ref 8.3–10.1)
CHLORIDE SERPL-SCNC: 104 MMOL/L (ref 100–108)
CO2 SERPL-SCNC: 24 MMOL/L (ref 21–32)
CREAT SERPL-MCNC: 1.48 MG/DL (ref 0.6–1.3)
GFR SERPL CREATININE-BSD FRML MDRD: 47 ML/MIN/1.73SQ M
GLUCOSE SERPL-MCNC: 182 MG/DL (ref 65–140)
GLUCOSE SERPL-MCNC: 212 MG/DL (ref 65–140)
GLUCOSE SERPL-MCNC: 238 MG/DL (ref 65–140)
GLUCOSE SERPL-MCNC: 91 MG/DL (ref 65–140)
GLUCOSE SERPL-MCNC: 99 MG/DL (ref 65–140)
POTASSIUM SERPL-SCNC: 4.7 MMOL/L (ref 3.5–5.3)
SODIUM SERPL-SCNC: 137 MMOL/L (ref 136–145)
VANCOMYCIN TROUGH SERPL-MCNC: 9.7 UG/ML (ref 10–20)

## 2020-10-21 PROCEDURE — C9113 INJ PANTOPRAZOLE SODIUM, VIA: HCPCS | Performed by: PHYSICIAN ASSISTANT

## 2020-10-21 PROCEDURE — 82948 REAGENT STRIP/BLOOD GLUCOSE: CPT

## 2020-10-21 PROCEDURE — 99232 SBSQ HOSP IP/OBS MODERATE 35: CPT | Performed by: INTERNAL MEDICINE

## 2020-10-21 PROCEDURE — 80202 ASSAY OF VANCOMYCIN: CPT | Performed by: INTERNAL MEDICINE

## 2020-10-21 PROCEDURE — 99232 SBSQ HOSP IP/OBS MODERATE 35: CPT | Performed by: PHYSICIAN ASSISTANT

## 2020-10-21 PROCEDURE — 80048 BASIC METABOLIC PNL TOTAL CA: CPT | Performed by: PHYSICIAN ASSISTANT

## 2020-10-21 RX ORDER — PANTOPRAZOLE SODIUM 40 MG/10ML
40 INJECTION, POWDER, LYOPHILIZED, FOR SOLUTION INTRAVENOUS
Status: DISCONTINUED | OUTPATIENT
Start: 2020-10-21 | End: 2020-10-27 | Stop reason: HOSPADM

## 2020-10-21 RX ORDER — INSULIN GLARGINE 100 [IU]/ML
8 INJECTION, SOLUTION SUBCUTANEOUS
Status: DISCONTINUED | OUTPATIENT
Start: 2020-10-21 | End: 2020-10-22

## 2020-10-21 RX ORDER — OLANZAPINE 10 MG/1
2.5 INJECTION, POWDER, LYOPHILIZED, FOR SOLUTION INTRAMUSCULAR ONCE
Status: COMPLETED | OUTPATIENT
Start: 2020-10-21 | End: 2020-10-21

## 2020-10-21 RX ADMIN — HEPARIN SODIUM 5000 UNITS: 5000 INJECTION INTRAVENOUS; SUBCUTANEOUS at 15:39

## 2020-10-21 RX ADMIN — VANCOMYCIN HYDROCHLORIDE 1250 MG: 5 INJECTION, POWDER, LYOPHILIZED, FOR SOLUTION INTRAVENOUS at 05:57

## 2020-10-21 RX ADMIN — ATORVASTATIN CALCIUM 40 MG: 40 TABLET, FILM COATED ORAL at 16:44

## 2020-10-21 RX ADMIN — CARBIDOPA AND LEVODOPA 1 TABLET: 25; 100 TABLET ORAL at 09:02

## 2020-10-21 RX ADMIN — AMLODIPINE BESYLATE 5 MG: 5 TABLET ORAL at 09:02

## 2020-10-21 RX ADMIN — PANTOPRAZOLE SODIUM 40 MG: 40 INJECTION, POWDER, FOR SOLUTION INTRAVENOUS at 05:50

## 2020-10-21 RX ADMIN — INSULIN LISPRO 3 UNITS: 100 INJECTION, SOLUTION INTRAVENOUS; SUBCUTANEOUS at 11:37

## 2020-10-21 RX ADMIN — DOCUSATE SODIUM AND SENNOSIDES 1 TABLET: 8.6; 5 TABLET, FILM COATED ORAL at 09:02

## 2020-10-21 RX ADMIN — INSULIN LISPRO 1 UNITS: 100 INJECTION, SOLUTION INTRAVENOUS; SUBCUTANEOUS at 16:52

## 2020-10-21 RX ADMIN — CARBIDOPA AND LEVODOPA 1 TABLET: 25; 100 TABLET ORAL at 16:44

## 2020-10-21 RX ADMIN — DOCUSATE SODIUM AND SENNOSIDES 1 TABLET: 8.6; 5 TABLET, FILM COATED ORAL at 18:24

## 2020-10-21 RX ADMIN — OLANZAPINE 2.5 MG: 10 INJECTION, POWDER, FOR SOLUTION INTRAMUSCULAR at 02:40

## 2020-10-21 RX ADMIN — INSULIN GLARGINE 8 UNITS: 100 INJECTION, SOLUTION SUBCUTANEOUS at 22:11

## 2020-10-21 RX ADMIN — VANCOMYCIN HYDROCHLORIDE 750 MG: 750 INJECTION, SOLUTION INTRAVENOUS at 16:45

## 2020-10-21 NOTE — PROGRESS NOTES
Progress Note - Infectious Disease   Cheyanne Lopesorel 69 y.o. male MRN: [id number]  Unit/Bed#: E2 -01 Encounter: [id number]    Impression/Plan:  1. Sepsis. POA.  Fever and leukocytosis.  Was initially felt to be secondary to an aspiration event with possible pneumonia, however blood cultures show MRSA bacteremia. Fortunately the patient has clinically improved.  Fever curve and WBC count have trended down. His procalcitonin level has normalized.  Repeat blood cultures are negative after 48 hours.  -antibiotic as below  -monitor CBC and BMP  -follow up repeat blood cultures  -monitor vitals  -supportive care     2. MRSA bacteremia. No definitive etiology although the patient's urine culture is also positive for MRSA.  As the patient does not have chronic indwelling Alford catheter in place, must consider the possibility of more of a disseminated process that went to the urine. Unclear if this is a true bacteremia as the patient clinically improved without any treatment for MRSA, however this will be difficult to ignore, especially since repeat blood cultures were not obtained prior to giving vancomycin.  The patient is currently receiving IV vancomycin and appears to be tolerating antibiotic treatment without difficulty. TTE was negative.  Repeat blood cultures are negative after 48 hours. Patient will require PICC placement and a 4-week IV antibiotic treatment course.  If patient decides to go home instead of rehab I will provide antibiotic scripts after his vancomycin trough level is therapeutic.   -continue IV vancomycin through 11/14/2020 for a total of 4 weeks of IV antibiotic treatment  -continue pharmacy consult for guidance on vancomycin dosage  -monitor CBC and BMP  -follow up repeat blood cultures  -monitor vitals  -wait to place PICC until repeat blood cultures are negative 72 hours  -PICC will need to be removed after final dose of IV antibiotics  -patient will require outpatient ID follow up on 2020 at 10am with Dr. Mirza Pandey  -if patient chooses DC home I will provide abx and lab scripts once his vancomycin level is therapeutic     3. Possible MRSA UTI. More likely that this was a hematogenous process as the patient does not have chronic arevalo catheter in place. No complication seen on CT the abdomen pelvis.  -antibiotics as above  -monitor  symptoms  -monitor urine output  -no additional ID workup for now     4. Acute kidney injury. On chronic kidney disease.  Likely a pre renal issue.  Perhaps medications were playing a role.  Perhaps ATN was playing a role.  The renal function has improved with resuscitation. Creatinine is 1.48 today.  -monitor BMP  -dose adjust antibiotics for renal function as needed  -continue follow-up with Nephrology     5. Type 2 diabetes mellitus with hyperglycemia without long-term insulin use.  Patient's last hemoglobin A1c was 7.7% on 10/19/2020.  Elevated blood glucose is risk factor for infection.  Recommend tighter glycemic control for overall health, especially in the setting of infection.  -blood glucose management per primary service     6. Dementia.  Associated with Alzheimer's disease and Parkinson's disease.  -supportive care    Antibiotics:  Vancomycin 4  Antibiotics 7    Subjective:  Unable to obtain ROS as patient is nonverbal with dementia. Objective:  Vitals:  Temp:  [97.2 °F (36.2 °C)-98.1 °F (36.7 °C)] 97.2 °F (36.2 °C)  HR:  [69-85] 85  Resp:  [16-20] 18  BP: (139-168)/(66-75) 168/75  SpO2:  [95 %-98 %] 98 %  Temp (24hrs), Av.6 °F (36.4 °C), Min:97.2 °F (36.2 °C), Max:98.1 °F (36.7 °C)  Current: Temperature: (!) 97.2 °F (36.2 °C)    Physical Exam:   General Appearance:  Awake, non verbal and minimally interactive although he does track me around the room today, nontoxic, in no acute distress. He appears chronically debilitated. He appears comfortable sitting up in bed. Throat: Oropharynx moist without lesions.     Lungs:   Clear to auscultation bilaterally; no wheezes, rhonchi or rales; respirations unlabored; patient is on room air   Heart:  RRR; no murmur, rub or gallop   Abdomen:   Soft, no facial grimace with palpation non-distended, positive bowel sounds. Extremities: No clubbing or cyanosis, no edema   Skin: No new rashes, lesions, or draining wounds noted on exposed skin. Labs, Imaging, & Other studies:   All pertinent labs and imaging studies were personally reviewed  Results from last 7 days   Lab Units 10/20/20  0516 10/19/20  0443 10/18/20  0907   WBC Thousand/uL 8.31 10.61* 11.15*   HEMOGLOBIN g/dL 9.6* 9.2* 8.7*   PLATELETS Thousands/uL 285 234 211     Results from last 7 days   Lab Units 10/21/20  0428  10/19/20  0443 10/18/20  0907  10/16/20  0502   POTASSIUM mmol/L 4.7   < > 4.1 3.8   < > 5.0   CHLORIDE mmol/L 104   < > 104 105   < > 114*   CO2 mmol/L 24   < > 24 24   < > 19*   BUN mg/dL 27*   < > 19 25   < > 68*   CREATININE mg/dL 1.48*   < > 1.43* 1.41*   < > 2.40*   EGFR ml/min/1.73sq m 47   < > 49 50   < > 26   CALCIUM mg/dL 9.1   < > 9.1 8.5   < > 9.2   AST U/L  --   --  76* 87*  --  89*   ALT U/L  --   --  59 87*  --  150*   ALK PHOS U/L  --   --  102 93  --  106    < > = values in this interval not displayed. Results from last 7 days   Lab Units 10/18/20  1627 10/18/20  1626 10/16/20  0435 10/15/20  2302 10/15/20  2105 10/15/20  2055   BLOOD CULTURE  No Growth at 48 hrs. No Growth at 48 hrs.  --   --  No Growth After 5 Days.  Methicillin Resistant Staphylococcus aureus*   GRAM STAIN RESULT   --   --   --   --   --  Gram positive cocci in clusters*   URINE CULTURE   --   --   --  >100,000 cfu/ml Methicillin Resistant Staphylococcus aureus*  --   --    LEGIONELLA URINARY ANTIGEN   --   --  Negative  --   --   --      Results from last 7 days   Lab Units 10/17/20  0728 10/16/20  0502 10/15/20  2100   PROCALCITONIN ng/ml 0.18 0.43* 0.53*

## 2020-10-21 NOTE — PLAN OF CARE
Problem: Potential for Falls  Goal: Patient will remain free of falls  Description: INTERVENTIONS:  - Assess patient frequently for physical needs  -  Identify cognitive and physical deficits and behaviors that affect risk of falls.   -  [place] fall precautions as indicated by assessment.  - Educate patient/family on patient safety including physical limitations  - Instruct patient to call for assistance with activity based on assessment  - Modify environment to reduce risk of injury  - Consider OT/PT consult to assist with strengthening/mobility  Outcome: Progressing     Problem: Prexisting or High Potential for Compromised Skin Integrity  Goal: Skin integrity is maintained or improved  Description: INTERVENTIONS:  - Identify patients at risk for skin breakdown  - Assess and monitor skin integrity  - Assess and monitor nutrition and hydration status  - Monitor labs   - Assess for incontinence   - Turn and reposition patient  - Assist with mobility/ambulation  - Relieve pressure over bony prominences  - Avoid friction and shearing  - Provide appropriate hygiene as needed including keeping skin clean and dry  - Evaluate need for skin moisturizer/barrier cream  - Collaborate with interdisciplinary team   - Patient/family teaching  - Consider wound care consult   Outcome: Progressing     Problem: DISCHARGE PLANNING - CARE MANAGEMENT  Goal: Discharge to post-acute care or home with appropriate resources  Description: INTERVENTIONS:  - Conduct assessment to determine patient/family and health care team treatment goals, and need for post-acute services based on payer coverage, community resources, and patient preferences, and barriers to discharge  - Address psychosocial, clinical, and financial barriers to discharge as identified in assessment in conjunction with the patient/family and health care team  - Arrange appropriate level of post-acute services according to patient's   needs and preference and payer coverage in collaboration with the physician and health care team  - Communicate with and update the patient/family, physician, and health care team regarding progress on the discharge plan  - Arrange appropriate transportation to post-acute venues  Outcome: Progressing     Problem: INFECTION - ADULT  Goal: Absence or prevention of progression during hospitalization  Description: INTERVENTIONS:  - Assess and monitor for signs and symptoms of infection  - Monitor lab/diagnostic results  - Monitor all insertion sites, i.e. indwelling lines, tubes, and drains  - Monitor endotracheal if appropriate and nasal secretions for changes in amount and color  - [place] appropriate cooling/warming therapies per order  - Administer medications as ordered  - Instruct and encourage patient and family to use good hand hygiene technique  - Identify and instruct in appropriate isolation precautions for identified infection/condition  Outcome: Progressing  Goal: Absence of fever/infection during neutropenic period  Description: INTERVENTIONS:  - Monitor WBC    Outcome: Progressing     Problem: SAFETY ADULT  Goal: Patient will remain free of falls  Description: INTERVENTIONS:  - Assess patient frequently for physical needs  -  Identify cognitive and physical deficits and behaviors that affect risk of falls.   -  [place] fall precautions as indicated by assessment.  - Educate patient/family on patient safety including physical limitations  - Instruct patient to call for assistance with activity based on assessment  - Modify environment to reduce risk of injury  - Consider OT/PT consult to assist with strengthening/mobility  Outcome: Progressing  Goal: Maintain or return to baseline ADL function  Description: INTERVENTIONS:  -  Assess patient's ability to carry out ADLs; assess patient's baseline for ADL function and identify physical deficits which impact ability to perform ADLs (bathing, care of mouth/teeth, toileting, grooming, dressing, etc.)  - Assess/evaluate cause of self-care deficits   - Assess range of motion  - Assess patient's mobility; develop plan if impaired  - Assess patient's need for assistive devices and provide as appropriate  - Encourage maximum independence but intervene and supervise when necessary  - Involve family in performance of ADLs  - Assess for home care needs following discharge   - Consider OT consult to assist with ADL evaluation and planning for discharge  - Provide patient education as appropriate  Outcome: Progressing  Goal: Maintain or return mobility status to optimal level  Description: INTERVENTIONS:  - Assess patient's baseline mobility status (ambulation, transfers, stairs, etc.)    - Identify cognitive and physical deficits and behaviors that affect mobility  - Identify mobility aids required to assist with transfers and/or ambulation (gait belt, sit-to-stand, lift, walker, cane, etc.)  - [place] fall precautions as indicated by assessment  - Record patient progress and toleration of activity level on Mobility SBAR; progress patient to next Phase/Stage  - Instruct patient to call for assistance with activity based on assessment  - Consider rehabilitation consult to assist with strengthening/weightbearing, etc.  Outcome: Progressing     Problem: DISCHARGE PLANNING  Goal: Discharge to home or other facility with appropriate resources  Description: INTERVENTIONS:  - Identify barriers to discharge w/patient and caregiver  - Arrange for needed discharge resources and transportation as appropriate  - Identify discharge learning needs (meds, wound care, etc.)  - Arrange for interpretive services to assist at discharge as needed  - Refer to Case Management Department for coordinating discharge planning if the patient needs post-hospital services based on physician/advanced practitioner order or complex needs related to functional status, cognitive ability, or social support system  Outcome: Progressing     Problem: Knowledge Deficit  Goal: Patient/family/caregiver demonstrates understanding of disease process, treatment plan, medications, and discharge instructions  Description: Complete learning assessment and assess knowledge base. Interventions:  - Provide teaching at level of understanding  - Provide teaching via preferred learning methods  Outcome: Progressing     Problem: Nutrition/Hydration-ADULT  Goal: Nutrient/Hydration intake appropriate for improving, restoring or maintaining nutritional needs  Description: Monitor and assess patient's nutrition/hydration status for malnutrition. Collaborate with interdisciplinary team and initiate plan and interventions as ordered. Monitor patient's weight and dietary intake as ordered or per policy. Utilize nutrition screening tool and intervene as necessary. Determine patient's food preferences and provide high-protein, high-caloric foods as appropriate.      INTERVENTIONS:  - Monitor oral intake, urinary output, labs, and treatment plans  - Assess nutrition and hydration status and recommend course of action  - Evaluate amount of meals eaten  - Assist patient with eating if necessary   - Allow adequate time for meals  - Recommend/ encourage appropriate diets, oral nutritional supplements, and vitamin/mineral supplements  - Order, calculate, and assess calorie counts as needed  - Recommend, monitor, and adjust tube feedings and TPN/PPN based on assessed needs  - Assess need for intravenous fluids  - Provide specific nutrition/hydration education as appropriate  - Include patient/family/caregiver in decisions related to nutrition  Outcome: Progressing     Problem: GASTROINTESTINAL - ADULT  Goal: Maintains or returns to baseline bowel function  Description: INTERVENTIONS:  - Assess bowel function  - Encourage oral fluids to ensure adequate hydration  - Administer IV fluids if ordered to ensure adequate hydration  - Administer ordered medications as needed  - Encourage mobilization and activity  - Consider nutritional services referral to assist patient with adequate nutrition and appropriate food choices  Outcome: Progressing     Problem: SKIN/TISSUE INTEGRITY - ADULT  Goal: Skin integrity remains intact  Description: INTERVENTIONS  - Identify patients at risk for skin breakdown  - Assess and monitor skin integrity  - Assess and monitor nutrition and hydration status  - Monitor labs (i.e. albumin)  - Assess for incontinence   - Turn and reposition patient  - Assist with mobility/ambulation  - Relieve pressure over bony prominences  - Avoid friction and shearing  - Provide appropriate hygiene as needed including keeping skin clean and dry  - Evaluate need for skin moisturizer/barrier cream  - Collaborate with interdisciplinary team (i.e. Nutrition, Rehabilitation, etc.)   - Patient/family teaching  Outcome: Progressing     Problem: GENITOURINARY - ADULT  Goal: Urinary catheter remains patent  Description: INTERVENTIONS:  - Assess patency of condom catheter     Outcome: Progressing

## 2020-10-21 NOTE — PROGRESS NOTES
NEPHROLOGY PROGRESS NOTE   Wilhemina Side Dejesusmorel 69 y.o. male MRN: [id number]  Unit/Bed#: E2 -01 Encounter: [id number]      ASSESSMENT & PLAN    1. Acute Kidney Injury was present on admission and this has resolved  ? Etiology was likely pre renal azotemia with a component of ATN while Ace inhibition  ? ACE-inhibitor on hold and received IV fluids and creatinine improved now to baseline  ? Will monitor off IV fluids  ? Monitor antibiotic levels-pharmacy  ? Medications otherwise appropriately dosed  ? Urinalysis shows a specific gravity 1.025, 2+ proteinuria this should be worked up as an outpatient  ? CT of the abdomen and pelvis on October 15th does show extensive renal vascular calcification but no evidence of nephrolithiasis or obstructive uropathy  ? creatinine stable around 1.4  2. Electrolytes:  ? Electrolytes currently acceptable  3. Acid/Base:  ? Now anion gap and bicarb stable  4. BP/HR:  ? Hypertension- his blood pressure seemed to have stabilized around 140/60  Avoid hypotension continue current management he remains on amlodipine 5 mg daily  5. Volume Status  ? Appears euvolemic  6. Anemia  ? Normocytic anemia in the setting of chronic kidney disease  ? Should have workup done  ? Will hold on checking iron stores in the setting of being treated for infection  7. MBD  ? Check phosphorus and PTH as an outpatient  8. Health Maintanance/Risk Reduction  ?  Sepsis secondary to pneumonia UTI-has a staph aureus bacteremia-being treated with vancomycin will need long-term antibiotic  ?  mental status at baseline    SUBJECTIVE:     patient was seen today   no acute events overnight   blood pressure better controlled    OBJECTIVE:  Current Weight: Weight - Scale: 62.7 kg (138 lb 3.7 oz)  Vitals:    10/21/20 0900   BP: 168/75   Pulse: 85   Resp: 18   Temp: (!) 97.2 °F (36.2 °C)   SpO2:        Intake/Output Summary (Last 24 hours) at 10/21/2020 0949  Last data filed at 10/21/2020 0939  Gross per 24 hour   Intake 200 ml   Output 574 ml   Net -374 ml     Weight (last 2 days)     Date/Time   Weight    10/21/20 0542   62.7 (138.23)    10/20/20 0600   62.3 (137.35)              General: conscious, cooperative, in not acute distress  Eyes: conjunctivae pink, anicteric sclerae  ENT: lips and mucous membranes moist  Neck: supple, no JVD  Chest: clear breath sounds bilateral, no crackles, ronchus or wheezings  CVS: distinct S1 & S2, normal rate, regular rhythm  Abdomen: soft, non-tender, non-distended, normoactive bowel sounds  Extremities: no edema of both legs  Skin: no rash  Neuro: awake, alert, oriented      Medications:    Current Facility-Administered Medications:   •  acetaminophen (TYLENOL) tablet 650 mg, 650 mg, Oral, Q6H PRN, [place] Mangle, DO  •  amLODIPine (NORVASC) tablet 5 mg, 5 mg, Oral, Daily, Kelvin Vazquez, DO, 5 mg at 10/21/20 0902  •  atorvastatin (LIPITOR) tablet 40 mg, 40 mg, Oral, Daily With Dinner, [facility], DO, 40 mg at 10/20/20 1702  •  carbidopa-levodopa (SINEMET)  mg per tablet 1 tablet, 1 tablet, Oral, TID, Valentepriscilla Martínez, DO, 1 tablet at 10/21/20 0902  •  heparin (porcine) subcutaneous injection 5,000 Units, 5,000 Units, Subcutaneous, Q8H [address], [facility], DO, 5,000 Units at 10/20/20 2107  •  hydrALAZINE (APRESOLINE) injection 5 mg, 5 mg, Intravenous, Q6H PRN, Norma Gonzalez, DO  •  insulin glargine (LANTUS) subcutaneous injection 10 Units 0.1 mL, 10 Units, Subcutaneous, HS, [facility], DO, 10 Units at 10/20/20 2106  •  insulin lispro (HumaLOG) 100 units/mL subcutaneous injection 1-6 Units, 1-6 Units, Subcutaneous, 4x Daily (AC & HS), 3 Units at 10/20/20 2106 **AND** Fingerstick Glucose (POCT), , , 4x Daily AC and at bedtime, Valente Martínez, DO  •  levalbuterol (XOPENEX) inhalation solution 1.25 mg, 1.25 mg, Nebulization, Q6H PRN, Young Medel, DO  •  ondansetron (ZOFRAN) injection 4 mg, 4 mg, Intravenous, Q6H PRN, Young Medel, DO  •  pantoprazole (PROTONIX) injection 40 mg, 40 mg, Intravenous, Q24H [address], Yola Rutherford PA-C, 40 mg at 10/21/20 8946  •  senna-docusate sodium (SENOKOT S) 8.6-50 mg per tablet 1 tablet, 1 tablet, Oral, BID, Rashmi Moran DO, 1 tablet at 10/21/20 0902  •  traZODone (DESYREL) tablet 150 mg, 150 mg, Oral, HS, Young Medel, DO, 150 mg at 10/19/20 2353  •  vancomycin (VANCOCIN) IVPB (premix in dextrose) 750 mg 150 mL, 750 mg, Intravenous, Q12H, Jesika Beck MD    Invasive Devices:      Lab Results:   Results from last 7 days   Lab Units 10/21/20  0428 10/20/20  0516 10/19/20  0443 10/18/20  1627 10/18/20  1626 10/18/20  0907 10/17/20  0728 10/16/20  0502 10/15/20  2302 10/15/20  2105 10/15/20  2100  10/15/20  2055   WBC Thousand/uL  --  8.31 10.61*  --   --  11.15* 12.76* 17.23*  --   --  17.47*   < >  --    HEMOGLOBIN g/dL  --  9.6* 9.2*  --   --  8.7* 8.2* 9.0*  --   --  9.4*   < >  --    HEMATOCRIT %  --  31.2* 30.9*  --   --  30.0* 27.7* 31.0*  --   --  31.8*   < >  --    PLATELETS Thousands/uL  --  285 234  --   --  211 242 283  --   --  331   < >  --    POTASSIUM mmol/L 4.7 5.2 4.1  --   --  3.8 4.1 5.0  --   --  5.1   < >  --    CHLORIDE mmol/L 104 103 104  --   --  105 112* 114*  --   --  110*   < >  --    CO2 mmol/L 24 25 24  --   --  24 26 19*  --   --  23   < >  --    BUN mg/dL 27* 18 19  --   --  25 42* 68*  --   --  70*   < >  --    CREATININE mg/dL 1.48* 1.18 1.43*  --   --  1.41* 1.61* 2.40*  --   --  2.86*   < >  --    CALCIUM mg/dL 9.1 9.1 9.1  --   --  8.5 9.0 9.2  --   --  9.8   < >  --    MAGNESIUM mg/dL  --   --   --   --   --   --  1.7  --   --   --   --   --   --    ALK PHOS U/L  --   --  102  --   --  93  --  106  --   --  117*  --   --    ALT U/L  --   --  59  --   --  87*  --  150*  --   --  133*  --   --    AST U/L  --   --  76*  --   --  87*  --  89*  --   --  130*  --   --    BLOOD CULTURE   --   --   --  No Growth at 48 hrs. No Growth at 48 hrs.  --   --   --   --  No Growth After 5 Days.   --   --  Methicillin Resistant Staphylococcus aureus*   LEUKOCYTES UA   --   --   --   --   --   --   --   --  Large*  --   --   --   --    BLOOD UA   --   --   --   --   --   --   --   --  Small*  --   --   --   --     < > = values in this interval not displayed.

## 2020-10-21 NOTE — PROGRESS NOTES
Progress Note - Jazzmine Barajassusmorel 1950, 69 y.o. male MRN: [id number]  Unit/Bed#: E2 -01 Encounter: [id number]  Primary Care Provider: Dandre Motta MD   Date and time admitted to hospital: 10/15/2020  8:46 PM    * Sepsis [facility])  Assessment & Plan  · Sepsis POA thought to be secondary to pneumonia originally however original blood cultures on 10/15 1 out of 2 growing MRSA and urine culture growing the same   · Infectious Disease consulted   · Switched to IV vancomycin given above   · Pharmacy consult for vanc dosing   · repeat blood cultures obtained on 10/18 negative x 48 hours   · Will need to place PICC with 4 weeks IV antibiotics once blood cultures negative x 72 hours   · Will need to obtain consent from family member once present at bedside later today, Dr. Freddy Sinclair notified  · Will place PICC order once blood cultures negative at 72 hours  · Continue to follow cultures       MRSA bacteremia  Assessment & Plan  1 out of 2 blood cultures positive on admission growing MRSA   Urine culture growing the same   Repeat blood cultures negative x 24 hours   Will need PICC placement once blood cultures negative x 72 hours for 4 weeks of IV antibiotics through 11/14   Echo: no vegetation/endocarditis   Follow hemodynamics, fever curve, trend WBC  Follow cultures       UTI (urinary tract infection)  Assessment & Plan  MRSA UTI   Continue IV vancomycin    Follow cultures   See plan above     Hyperlipidemia  Assessment & Plan  · Hyperlipidemia.     · Substitute crestor with atorvastatin based on hospital formulary    Hypertension  Assessment & Plan  · Lisinopril held on admission given DREW  · Nephrology recommending NOT restarting ACE-I on discharge   · Started on amlodipine, increased to 5 mg once daily   · Monitor VS trend     Parkinson disease ([address])  Assessment & Plan  · Parkinson's dementia not much verbal at baseline and essentially bed/wheelchair bound  · Continue sinemet 25/100 t.i.d.  · speech eval - level 2 with thin liquids   · Spoke with patient's daughter-in-law at length using blue phone, patient lives with her and son and is non-verbal at baseline,  He is non ambulatory and they carry him to a recliner, she states he is at his baseline and  Has been this way since 2009, she states he always eats slowly takes about an hour to eat    Diabetes mellitus [facility])  Assessment & Plan  Lab Results   Component Value Date    HGBA1C 7.7 (H) 10/19/2020     Results from last 7 days   Lab Units 10/20/20  0516   GLUCOSE RANDOM mg/dL 109     · Diabetes mellitus with hyperglycemia. Hold metformin while hospitalized. · Patient was added lantus 8 units qHS while here   · Of note, A1c is at goal per last A1c given age and will likely not need insulin upon discharge   · Diabetic diet   · SSI   · Monitor for any hypoglycemia     GERD (gastroesophageal reflux disease)  Assessment & Plan  · GERD continue PPI    Anemia  Assessment & Plan  In setting of CKD   Will need workup as outpatient with PCP   No evidence of active bleeding     Lab Results   Component Value Date    HGB 9.6 (L) 10/20/2020    HGB 9.2 (L) 10/19/2020    HGB 8.7 (L) 10/18/2020    HGB 8.2 (L) 10/17/2020    HGB 9.0 (L) 10/16/2020         CKD (chronic kidney disease) stage 3, GFR 30-59 ml/min  Assessment & Plan  Lab Results   Component Value Date    EGFR 62 10/20/2020    EGFR 49 10/19/2020    EGFR 50 10/18/2020    CREATININE 1.18 10/20/2020    CREATININE 1.43 (H) 10/19/2020    CREATININE 1.41 (H) 10/18/2020     DREW resolved   Continue to monitor renal function       VTE Pharmacologic Prophylaxis:   Pharmacologic: Heparin  Mechanical VTE Prophylaxis in Place: No    Patient Centered Rounds: I have performed bedside rounds with nursing staff today.     Discussions with Specialists or Other Care Team Provider: Nephrology     Education and Discussions with Family / Patient: spoke with patient at the bedside, will touch base with family once present at the bedside, nursing will notify [facility] once they are here     Time Spent for Care: 20 minutes. More than 50% of total time spent on counseling and coordination of care as described above. Current Length of Stay: 6 day(s)    Current Patient Status: Inpatient   Certification Statement: The patient will continue to require additional inpatient hospital stay due to sepsis, bacteremia     Discharge Plan: pendign PICC placement and arrangements for discharge     Code Status: Level 1 - Full Code      Subjective:    nursing notify the patient was agitated this morning. They believe he was just hungry as after he ate in got washed up he is now resting in bed comfortably. He smiled at me this morning. He actually responded "mariano" and when asked how he is he said "vick"     Objective:     Vitals:   Temp (24hrs), Av.6 °F (36.4 °C), Min:97.2 °F (36.2 °C), Max:98.1 °F (36.7 °C)    Temp:  [97.2 °F (36.2 °C)-98.1 °F (36.7 °C)] 97.2 °F (36.2 °C)  HR:  [69-85] 85  Resp:  [16-20] 18  BP: (139-168)/(66-75) 168/75  SpO2:  [95 %-98 %] 98 %  Body mass index is 21.65 kg/m². Input and Output Summary (last 24 hours): Intake/Output Summary (Last 24 hours) at 10/21/2020 1007  Last data filed at 10/21/2020 0939  Gross per 24 hour   Intake 200 ml   Output 574 ml   Net -374 ml       Physical Exam:     Physical Exam  Vitals signs and nursing note reviewed. Constitutional:       General: He is not in acute distress. Eyes:      Conjunctiva/sclera: Conjunctivae normal.   Cardiovascular:      Rate and Rhythm: Normal rate and regular rhythm. Pulmonary:      Effort: Pulmonary effort is normal. No respiratory distress. Breath sounds: Normal breath sounds. No wheezing or rales. Abdominal:      General: Bowel sounds are normal.      Palpations: Abdomen is soft. Genitourinary:     Comments: Incontinent of urine   Musculoskeletal:      Right lower leg: No edema. Left lower leg: No edema.       Comments: prevalon boots present Skin:     General: Skin is warm. Neurological:      Mental Status: He is alert. Comments: Primarily nonverbal, did say "mariano" today and "vick"   Psychiatric:         Behavior: Behavior is not agitated or aggressive. Cognition and Memory: Cognition is impaired. Comments: Was agitated this AM per nursing but resting in bed at baseline currently, appears comfortable          Additional Data:     Labs:    Results from last 7 days   Lab Units 10/20/20  0516   WBC Thousand/uL 8.31   HEMOGLOBIN g/dL 9.6*   HEMATOCRIT % 31.2*   PLATELETS Thousands/uL 285   NEUTROS PCT % 64   LYMPHS PCT % 25   MONOS PCT % 6   EOS PCT % 4     Results from last 7 days   Lab Units 10/21/20  0428  10/19/20  0443   SODIUM mmol/L 137   < > 138   POTASSIUM mmol/L 4.7   < > 4.1   CHLORIDE mmol/L 104   < > 104   CO2 mmol/L 24   < > 24   BUN mg/dL 27*   < > 19   CREATININE mg/dL 1.48*   < > 1.43*   ANION GAP mmol/L 9   < > 10   CALCIUM mg/dL 9.1   < > 9.1   ALBUMIN g/dL  --   --  2.5*   TOTAL BILIRUBIN mg/dL  --   --  0.32   ALK PHOS U/L  --   --  102   ALT U/L  --   --  59   AST U/L  --   --  76*   GLUCOSE RANDOM mg/dL 99   < > 119    < > = values in this interval not displayed. Results from last 7 days   Lab Units 10/15/20  2100   INR  1.22*     Results from last 7 days   Lab Units 10/21/20  0747 10/20/20  2058 10/20/20  1610 10/20/20  1128 10/20/20  0724 10/19/20  2107 10/19/20  1555 10/19/20  1053 10/19/20  0755 10/18/20  2053 10/18/20  1544 10/18/20  1056   POC GLUCOSE mg/dl 91 233* 254* 105 94 108 170* 178* 116 171* 166* 224*     Results from last 7 days   Lab Units 10/19/20  0443   HEMOGLOBIN A1C % 7.7*     Results from last 7 days   Lab Units 10/17/20  0728 10/16/20  0502 10/15/20  2300 10/15/20  2100   LACTIC ACID mmol/L  --  1.4 3.3* 3.4*   PROCALCITONIN ng/ml 0.18 0.43*  --  0.53*           * I Have Reviewed All Lab Data Listed Above. * Additional Pertinent Lab Tests Reviewed:  All Labs Within Last 24 Hours Reviewed    Imaging:    Imaging Reports Reviewed Today Include:   Imaging Personally Reviewed by Myself Includes:      Recent Cultures (last 7 days):     Results from last 7 days   Lab Units 10/18/20  1627 10/18/20  1626 10/16/20  0435 10/15/20  2302 10/15/20  2105 10/15/20  2055   BLOOD CULTURE  No Growth at 48 hrs. No Growth at 48 hrs.  --   --  No Growth After 5 Days. Methicillin Resistant Staphylococcus aureus*   GRAM STAIN RESULT   --   --   --   --   --  Gram positive cocci in clusters*   URINE CULTURE   --   --   --  >100,000 cfu/ml Methicillin Resistant Staphylococcus aureus*  --   --    LEGIONELLA URINARY ANTIGEN   --   --  Negative  --   --   --        Last 24 Hours Medication List:   Current Facility-Administered Medications   Medication Dose Route Frequency Provider Last Rate   • acetaminophen  650 mg Oral Q6H PRN Thom Fitting, DO     • amLODIPine  5 mg Oral Daily Cary Rendon, DO     • atorvastatin  40 mg Oral Daily With [employer] Card, DO     • carbidopa-levodopa  1 tablet Oral TID [place] Fitting, DO     • heparin (porcine)  5,000 Units Subcutaneous Q8H [address] Young Medel, DO     • hydrALAZINE  5 mg Intravenous Q6H PRN Cary Rendon, DO     • insulin glargine  8 Units Subcutaneous HS Krysta Dubon PA-C     • insulin lispro  1-6 Units Subcutaneous 4x Daily (AC & HS) Thom Fitting, DO     • levalbuterol  1.25 mg Nebulization Q6H PRN [place] Fitting, DO     • ondansetron  4 mg Intravenous Q6H PRN [place] Fitting, DO     • pantoprazole  40 mg Intravenous Q24H [address] Alexander Patten PA-C     • senna-docusate sodium  1 tablet Oral BID [facility]     • traZODone  150 mg Oral HS Young Medel, DO     • vancomycin  750 mg Intravenous Q12H Serina Carroll MD          Today, Patient Was Seen By: Roberto Perez PA-C    ** Please Note: Dictation voice to text software may have been used in the creation of this document.  **

## 2020-10-21 NOTE — QUICK NOTE
Pharmacy now dosing IV vancomycin as 750mg q12 hours. New scripts for IV antibiotic and lab work placed in patient chart on 10/21/2020.

## 2020-10-21 NOTE — PLAN OF CARE
Problem: Potential for Falls  Goal: Patient will remain free of falls  Description: INTERVENTIONS:  - Assess patient frequently for physical needs  -  Identify cognitive and physical deficits and behaviors that affect risk of falls.   -  [place] fall precautions as indicated by assessment.  - Educate patient/family on patient safety including physical limitations  - Instruct patient to call for assistance with activity based on assessment  - Modify environment to reduce risk of injury  - Consider OT/PT consult to assist with strengthening/mobility  Outcome: Progressing     Problem: Prexisting or High Potential for Compromised Skin Integrity  Goal: Skin integrity is maintained or improved  Description: INTERVENTIONS:  - Identify patients at risk for skin breakdown  - Assess and monitor skin integrity  - Assess and monitor nutrition and hydration status  - Monitor labs   - Assess for incontinence   - Turn and reposition patient  - Assist with mobility/ambulation  - Relieve pressure over bony prominences  - Avoid friction and shearing  - Provide appropriate hygiene as needed including keeping skin clean and dry  - Evaluate need for skin moisturizer/barrier cream  - Collaborate with interdisciplinary team   - Patient/family teaching  - Consider wound care consult   Outcome: Progressing     Problem: DISCHARGE PLANNING - CARE MANAGEMENT  Goal: Discharge to post-acute care or home with appropriate resources  Description: INTERVENTIONS:  - Conduct assessment to determine patient/family and health care team treatment goals, and need for post-acute services based on payer coverage, community resources, and patient preferences, and barriers to discharge  - Address psychosocial, clinical, and financial barriers to discharge as identified in assessment in conjunction with the patient/family and health care team  - Arrange appropriate level of post-acute services according to patient's   needs and preference and payer coverage in collaboration with the physician and health care team  - Communicate with and update the patient/family, physician, and health care team regarding progress on the discharge plan  - Arrange appropriate transportation to post-acute venues  Outcome: Progressing     Problem: INFECTION - ADULT  Goal: Absence or prevention of progression during hospitalization  Description: INTERVENTIONS:  - Assess and monitor for signs and symptoms of infection  - Monitor lab/diagnostic results  - Monitor all insertion sites, i.e. indwelling lines, tubes, and drains  - Monitor endotracheal if appropriate and nasal secretions for changes in amount and color  - [place] appropriate cooling/warming therapies per order  - Administer medications as ordered  - Instruct and encourage patient and family to use good hand hygiene technique  - Identify and instruct in appropriate isolation precautions for identified infection/condition  Outcome: Progressing  Goal: Absence of fever/infection during neutropenic period  Description: INTERVENTIONS:  - Monitor WBC    Outcome: Progressing     Problem: SAFETY ADULT  Goal: Patient will remain free of falls  Description: INTERVENTIONS:  - Assess patient frequently for physical needs  -  Identify cognitive and physical deficits and behaviors that affect risk of falls.   -  [place] fall precautions as indicated by assessment.  - Educate patient/family on patient safety including physical limitations  - Instruct patient to call for assistance with activity based on assessment  - Modify environment to reduce risk of injury  - Consider OT/PT consult to assist with strengthening/mobility  Outcome: Progressing  Goal: Maintain or return to baseline ADL function  Description: INTERVENTIONS:  -  Assess patient's ability to carry out ADLs; assess patient's baseline for ADL function and identify physical deficits which impact ability to perform ADLs (bathing, care of mouth/teeth, toileting, grooming, dressing, etc.)  - Assess/evaluate cause of self-care deficits   - Assess range of motion  - Assess patient's mobility; develop plan if impaired  - Assess patient's need for assistive devices and provide as appropriate  - Encourage maximum independence but intervene and supervise when necessary  - Involve family in performance of ADLs  - Assess for home care needs following discharge   - Consider OT consult to assist with ADL evaluation and planning for discharge  - Provide patient education as appropriate  Outcome: Progressing  Goal: Maintain or return mobility status to optimal level  Description: INTERVENTIONS:  - Assess patient's baseline mobility status (ambulation, transfers, stairs, etc.)    - Identify cognitive and physical deficits and behaviors that affect mobility  - Identify mobility aids required to assist with transfers and/or ambulation (gait belt, sit-to-stand, lift, walker, cane, etc.)  - [place] fall precautions as indicated by assessment  - Record patient progress and toleration of activity level on Mobility SBAR; progress patient to next Phase/Stage  - Instruct patient to call for assistance with activity based on assessment  - Consider rehabilitation consult to assist with strengthening/weightbearing, etc.  Outcome: Progressing     Problem: DISCHARGE PLANNING  Goal: Discharge to home or other facility with appropriate resources  Description: INTERVENTIONS:  - Identify barriers to discharge w/patient and caregiver  - Arrange for needed discharge resources and transportation as appropriate  - Identify discharge learning needs (meds, wound care, etc.)  - Arrange for interpretive services to assist at discharge as needed  - Refer to Case Management Department for coordinating discharge planning if the patient needs post-hospital services based on physician/advanced practitioner order or complex needs related to functional status, cognitive ability, or social support system  Outcome: Progressing     Problem: Knowledge Deficit  Goal: Patient/family/caregiver demonstrates understanding of disease process, treatment plan, medications, and discharge instructions  Description: Complete learning assessment and assess knowledge base. Interventions:  - Provide teaching at level of understanding  - Provide teaching via preferred learning methods  Outcome: Progressing     Problem: Nutrition/Hydration-ADULT  Goal: Nutrient/Hydration intake appropriate for improving, restoring or maintaining nutritional needs  Description: Monitor and assess patient's nutrition/hydration status for malnutrition. Collaborate with interdisciplinary team and initiate plan and interventions as ordered. Monitor patient's weight and dietary intake as ordered or per policy. Utilize nutrition screening tool and intervene as necessary. Determine patient's food preferences and provide high-protein, high-caloric foods as appropriate.      INTERVENTIONS:  - Monitor oral intake, urinary output, labs, and treatment plans  - Assess nutrition and hydration status and recommend course of action  - Evaluate amount of meals eaten  - Assist patient with eating if necessary   - Allow adequate time for meals  - Recommend/ encourage appropriate diets, oral nutritional supplements, and vitamin/mineral supplements  - Order, calculate, and assess calorie counts as needed  - Recommend, monitor, and adjust tube feedings and TPN/PPN based on assessed needs  - Assess need for intravenous fluids  - Provide specific nutrition/hydration education as appropriate  - Include patient/family/caregiver in decisions related to nutrition  Outcome: Progressing     Problem: GASTROINTESTINAL - ADULT  Goal: Maintains or returns to baseline bowel function  Description: INTERVENTIONS:  - Assess bowel function  - Encourage oral fluids to ensure adequate hydration  - Administer IV fluids if ordered to ensure adequate hydration  - Administer ordered medications as needed  - Encourage mobilization and activity  - Consider nutritional services referral to assist patient with adequate nutrition and appropriate food choices  Outcome: Progressing     Problem: SKIN/TISSUE INTEGRITY - ADULT  Goal: Skin integrity remains intact  Description: INTERVENTIONS  - Identify patients at risk for skin breakdown  - Assess and monitor skin integrity  - Assess and monitor nutrition and hydration status  - Monitor labs (i.e. albumin)  - Assess for incontinence   - Turn and reposition patient  - Assist with mobility/ambulation  - Relieve pressure over bony prominences  - Avoid friction and shearing  - Provide appropriate hygiene as needed including keeping skin clean and dry  - Evaluate need for skin moisturizer/barrier cream  - Collaborate with interdisciplinary team (i.e. Nutrition, Rehabilitation, etc.)   - Patient/family teaching  Outcome: Progressing     Problem: GENITOURINARY - ADULT  Goal: Urinary catheter remains patent  Description: INTERVENTIONS:  - Assess patency of condom catheter     Outcome: Progressing

## 2020-10-21 NOTE — PROGRESS NOTES
Vancomycin Assessment    Alexandr Ortega is a 69 y.o. male who is currently receiving vancomycin 1250mg IV every 24 hours for MRSA bacteremia    Relevant clinical data and objective history reviewed:  Creatinine   Date Value Ref Range Status   10/21/2020 1.48 (H) 0.60 - 1.30 mg/dL Final     Comment:     Standardized to IDMS reference method   10/20/2020 1.18 0.60 - 1.30 mg/dL Final     Comment:     Standardized to IDMS reference method   10/19/2020 1.43 (H) 0.60 - 1.30 mg/dL Final     Comment:     Standardized to IDMS reference method     /75 (BP Location: Right arm)   Pulse 85   Temp (!) 97.2 °F (36.2 °C) (Temporal)   Resp 18   Wt 62.7 kg (138 lb 3.7 oz)   SpO2 98%   BMI 21.65 kg/m²   No intake/output data recorded. Lab Results   Component Value Date/Time    BUN 27 (H) 10/21/2020 04:28 AM    WBC 8.31 10/20/2020 05:16 AM    HGB 9.6 (L) 10/20/2020 05:16 AM    HCT 31.2 (L) 10/20/2020 05:16 AM    MCV 94 10/20/2020 05:16 AM     10/20/2020 05:16 AM     Temp Readings from Last 3 Encounters:   10/21/20 (!) 97.2 °F (36.2 °C) (Temporal)   10/22/19 98.2 °F (36.8 °C) (Tympanic)     Vancomycin Days of Therapy: 4    Assessment/Plan  The patient is currently on vancomycin utilizing scheduled dosing. The patient is receiving  vancomycin 1250mg IV every 24 hours with the most recent vancomycin level being 9.7  and sub-therapeutic based on a goal of 15-20 ; therefore, after clinical evaluation will be changed to 750mg IV every 12 hours. Pharmacy will continue to follow closely for s/sx of nephrotoxicity, infusion reactions, and appropriateness of therapy. BMP and CBC will be ordered per protocol. Plan for trough as patient at approximately [id number] on 10/23/20. Pharmacy will continue to follow the patient’s culture results and clinical progress daily.     Gina Bright, Pharmacist

## 2020-10-22 ENCOUNTER — APPOINTMENT (INPATIENT)
Dept: RADIOLOGY | Facility: HOSPITAL | Age: 70
DRG: 871 | End: 2020-10-22
Attending: RADIOLOGY
Payer: COMMERCIAL

## 2020-10-22 LAB
BACTERIA BLD CULT: ABNORMAL
GLUCOSE SERPL-MCNC: 138 MG/DL (ref 65–140)
GLUCOSE SERPL-MCNC: 150 MG/DL (ref 65–140)
GLUCOSE SERPL-MCNC: 239 MG/DL (ref 65–140)
GLUCOSE SERPL-MCNC: 250 MG/DL (ref 65–140)
GRAM STN SPEC: ABNORMAL

## 2020-10-22 PROCEDURE — 36573 INSJ PICC RS&I 5 YR+: CPT

## 2020-10-22 PROCEDURE — 99232 SBSQ HOSP IP/OBS MODERATE 35: CPT | Performed by: INTERNAL MEDICINE

## 2020-10-22 PROCEDURE — 97110 THERAPEUTIC EXERCISES: CPT

## 2020-10-22 PROCEDURE — 76937 US GUIDE VASCULAR ACCESS: CPT

## 2020-10-22 PROCEDURE — 02HV33Z INSERTION OF INFUSION DEVICE INTO SUPERIOR VENA CAVA, PERCUTANEOUS APPROACH: ICD-10-PCS | Performed by: RADIOLOGY

## 2020-10-22 PROCEDURE — 97530 THERAPEUTIC ACTIVITIES: CPT

## 2020-10-22 PROCEDURE — 36573 INSJ PICC RS&I 5 YR+: CPT | Performed by: RADIOLOGY

## 2020-10-22 PROCEDURE — C1751 CATH, INF, PER/CENT/MIDLINE: HCPCS

## 2020-10-22 PROCEDURE — 82948 REAGENT STRIP/BLOOD GLUCOSE: CPT

## 2020-10-22 RX ORDER — INSULIN GLARGINE 100 [IU]/ML
10 INJECTION, SOLUTION SUBCUTANEOUS
Status: DISCONTINUED | OUTPATIENT
Start: 2020-10-22 | End: 2020-10-27 | Stop reason: HOSPADM

## 2020-10-22 RX ORDER — LIDOCAINE WITH 8.4% SOD BICARB 0.9%(10ML)
SYRINGE (ML) INJECTION CODE/TRAUMA/SEDATION MEDICATION
Status: COMPLETED | OUTPATIENT
Start: 2020-10-22 | End: 2020-10-22

## 2020-10-22 RX ADMIN — AMLODIPINE BESYLATE 5 MG: 5 TABLET ORAL at 08:34

## 2020-10-22 RX ADMIN — TRAZODONE HYDROCHLORIDE 150 MG: 100 TABLET ORAL at 22:16

## 2020-10-22 RX ADMIN — CARBIDOPA AND LEVODOPA 1 TABLET: 25; 100 TABLET ORAL at 22:16

## 2020-10-22 RX ADMIN — DOCUSATE SODIUM AND SENNOSIDES 1 TABLET: 8.6; 5 TABLET, FILM COATED ORAL at 17:20

## 2020-10-22 RX ADMIN — ATORVASTATIN CALCIUM 40 MG: 40 TABLET, FILM COATED ORAL at 17:19

## 2020-10-22 RX ADMIN — Medication 3 ML: at 15:57

## 2020-10-22 RX ADMIN — INSULIN LISPRO 3 UNITS: 100 INJECTION, SOLUTION INTRAVENOUS; SUBCUTANEOUS at 22:29

## 2020-10-22 RX ADMIN — HEPARIN SODIUM 5000 UNITS: 5000 INJECTION INTRAVENOUS; SUBCUTANEOUS at 22:30

## 2020-10-22 RX ADMIN — CARBIDOPA AND LEVODOPA 1 TABLET: 25; 100 TABLET ORAL at 08:34

## 2020-10-22 RX ADMIN — INSULIN GLARGINE 10 UNITS: 100 INJECTION, SOLUTION SUBCUTANEOUS at 22:28

## 2020-10-22 RX ADMIN — CARBIDOPA AND LEVODOPA 1 TABLET: 25; 100 TABLET ORAL at 17:19

## 2020-10-22 RX ADMIN — VANCOMYCIN HYDROCHLORIDE 750 MG: 750 INJECTION, SOLUTION INTRAVENOUS at 17:12

## 2020-10-22 RX ADMIN — INSULIN LISPRO 1 UNITS: 100 INJECTION, SOLUTION INTRAVENOUS; SUBCUTANEOUS at 17:16

## 2020-10-22 RX ADMIN — DOCUSATE SODIUM AND SENNOSIDES 1 TABLET: 8.6; 5 TABLET, FILM COATED ORAL at 08:35

## 2020-10-22 NOTE — CASE MANAGEMENT
POPEYE asked Shakir Macedo CM to call pt's family for he is able to speak [place]. He has confirmed with the son, Melvina Mosley that the family is willing to learn the IV abx for at home with the assistance of [facility]. They will also be able to transport pt home once ready. POPEYE has submitted a referral to [facility]. The abx are covered through [facility] at 100%. POPEYE will advise when [facility] is able to come out to pt's home. POPEYE also spoke with [facility] advising them that pt may discharge home tomorrow. POPEYE continuing to follow.

## 2020-10-22 NOTE — PLAN OF CARE
Problem: Potential for Falls  Goal: Patient will remain free of falls  Description: INTERVENTIONS:  - Assess patient frequently for physical needs  -  Identify cognitive and physical deficits and behaviors that affect risk of falls.   -  [place] fall precautions as indicated by assessment.  - Educate patient/family on patient safety including physical limitations  - Instruct patient to call for assistance with activity based on assessment  - Modify environment to reduce risk of injury  - Consider OT/PT consult to assist with strengthening/mobility  10/22/2020 0507 by Aileen Sofia RN  Outcome: Progressing  10/22/2020 0506 by Aileen Sofia RN  Outcome: Progressing  10/22/2020 0506 by Aileen Sofia RN  Outcome: Progressing     Problem: Prexisting or High Potential for Compromised Skin Integrity  Goal: Skin integrity is maintained or improved  Description: INTERVENTIONS:  - Identify patients at risk for skin breakdown  - Assess and monitor skin integrity  - Assess and monitor nutrition and hydration status  - Monitor labs   - Assess for incontinence   - Turn and reposition patient  - Assist with mobility/ambulation  - Relieve pressure over bony prominences  - Avoid friction and shearing  - Provide appropriate hygiene as needed including keeping skin clean and dry  - Evaluate need for skin moisturizer/barrier cream  - Collaborate with interdisciplinary team   - Patient/family teaching  - Consider wound care consult   10/22/2020 0507 by Aileen Sofia RN  Outcome: Progressing  10/22/2020 0506 by Aileen Sofia RN  Outcome: Progressing  10/22/2020 0506 by Aileen Sofia RN  Outcome: Progressing     Problem: DISCHARGE PLANNING - CARE MANAGEMENT  Goal: Discharge to post-acute care or home with appropriate resources  Description: INTERVENTIONS:  - Conduct assessment to determine patient/family and health care team treatment goals, and need for post-acute services based on payer coverage, community resources, and patient preferences, and barriers to discharge  - Address psychosocial, clinical, and financial barriers to discharge as identified in assessment in conjunction with the patient/family and health care team  - Arrange appropriate level of post-acute services according to patient's   needs and preference and payer coverage in collaboration with the physician and health care team  - Communicate with and update the patient/family, physician, and health care team regarding progress on the discharge plan  - Arrange appropriate transportation to post-acute venues  10/22/2020 0507 by Jim Youssef RN  Outcome: Progressing  10/22/2020 0506 by Jim Youssef RN  Outcome: Progressing     Problem: INFECTION - ADULT  Goal: Absence or prevention of progression during hospitalization  Description: INTERVENTIONS:  - Assess and monitor for signs and symptoms of infection  - Monitor lab/diagnostic results  - Monitor all insertion sites, i.e. indwelling lines, tubes, and drains  - Monitor endotracheal if appropriate and nasal secretions for changes in amount and color  - [place] appropriate cooling/warming therapies per order  - Administer medications as ordered  - Instruct and encourage patient and family to use good hand hygiene technique  - Identify and instruct in appropriate isolation precautions for identified infection/condition  10/22/2020 0507 by Jim Youssef RN  Outcome: Progressing  10/22/2020 0506 by Jim Youssef RN  Outcome: Progressing  Goal: Absence of fever/infection during neutropenic period  Description: INTERVENTIONS:  - Monitor WBC    10/22/2020 0507 by Jim Youssef RN  Outcome: Progressing  10/22/2020 0506 by Jim Youssef RN  Outcome: Progressing     Problem: SAFETY ADULT  Goal: Patient will remain free of falls  Description: INTERVENTIONS:  - Assess patient frequently for physical needs  -  Identify cognitive and physical deficits and behaviors that affect risk of falls.   - [place] fall precautions as indicated by assessment.  - Educate patient/family on patient safety including physical limitations  - Instruct patient to call for assistance with activity based on assessment  - Modify environment to reduce risk of injury  - Consider OT/PT consult to assist with strengthening/mobility  10/22/2020 0507 by Kelly Coker RN  Outcome: Progressing  10/22/2020 0506 by Kelly Coker RN  Outcome: Progressing  10/22/2020 0506 by Kelly Coker RN  Outcome: Progressing  Goal: Maintain or return to baseline ADL function  Description: INTERVENTIONS:  -  Assess patient's ability to carry out ADLs; assess patient's baseline for ADL function and identify physical deficits which impact ability to perform ADLs (bathing, care of mouth/teeth, toileting, grooming, dressing, etc.)  - Assess/evaluate cause of self-care deficits   - Assess range of motion  - Assess patient's mobility; develop plan if impaired  - Assess patient's need for assistive devices and provide as appropriate  - Encourage maximum independence but intervene and supervise when necessary  - Involve family in performance of ADLs  - Assess for home care needs following discharge   - Consider OT consult to assist with ADL evaluation and planning for discharge  - Provide patient education as appropriate  10/22/2020 0507 by Kelly Coker RN  Outcome: Progressing  10/22/2020 0506 by Kelly Coker RN  Outcome: Progressing  Goal: Maintain or return mobility status to optimal level  Description: INTERVENTIONS:  - Assess patient's baseline mobility status (ambulation, transfers, stairs, etc.)    - Identify cognitive and physical deficits and behaviors that affect mobility  - Identify mobility aids required to assist with transfers and/or ambulation (gait belt, sit-to-stand, lift, walker, cane, etc.)  - [place] fall precautions as indicated by assessment  - Record patient progress and toleration of activity level on Mobility SBAR; progress patient to next Phase/Stage  - Instruct patient to call for assistance with activity based on assessment  - Consider rehabilitation consult to assist with strengthening/weightbearing, etc.  10/22/2020 0507 by Bennett River RN  Outcome: Progressing  10/22/2020 0506 by Bennett River RN  Outcome: Progressing     Problem: DISCHARGE PLANNING  Goal: Discharge to home or other facility with appropriate resources  Description: INTERVENTIONS:  - Identify barriers to discharge w/patient and caregiver  - Arrange for needed discharge resources and transportation as appropriate  - Identify discharge learning needs (meds, wound care, etc.)  - Arrange for interpretive services to assist at discharge as needed  - Refer to Case Management Department for coordinating discharge planning if the patient needs post-hospital services based on physician/advanced practitioner order or complex needs related to functional status, cognitive ability, or social support system  10/22/2020 0507 by Bennett River RN  Outcome: Progressing  10/22/2020 0506 by Bennett River RN  Outcome: Progressing     Problem: Knowledge Deficit  Goal: Patient/family/caregiver demonstrates understanding of disease process, treatment plan, medications, and discharge instructions  Description: Complete learning assessment and assess knowledge base. Interventions:  - Provide teaching at level of understanding  - Provide teaching via preferred learning methods  10/22/2020 0507 by Bennett River RN  Outcome: Progressing  10/22/2020 0506 by Bennett River RN  Outcome: Progressing     Problem: Nutrition/Hydration-ADULT  Goal: Nutrient/Hydration intake appropriate for improving, restoring or maintaining nutritional needs  Description: Monitor and assess patient's nutrition/hydration status for malnutrition. Collaborate with interdisciplinary team and initiate plan and interventions as ordered.   Monitor patient's weight and dietary intake as ordered or per policy. Utilize nutrition screening tool and intervene as necessary. Determine patient's food preferences and provide high-protein, high-caloric foods as appropriate.      INTERVENTIONS:  - Monitor oral intake, urinary output, labs, and treatment plans  - Assess nutrition and hydration status and recommend course of action  - Evaluate amount of meals eaten  - Assist patient with eating if necessary   - Allow adequate time for meals  - Recommend/ encourage appropriate diets, oral nutritional supplements, and vitamin/mineral supplements  - Order, calculate, and assess calorie counts as needed  - Recommend, monitor, and adjust tube feedings and TPN/PPN based on assessed needs  - Assess need for intravenous fluids  - Provide specific nutrition/hydration education as appropriate  - Include patient/family/caregiver in decisions related to nutrition  10/22/2020 0507 by Macario Harper RN  Outcome: Progressing  10/22/2020 0506 by Macario Harper RN  Outcome: Progressing     Problem: GASTROINTESTINAL - ADULT  Goal: Maintains or returns to baseline bowel function  Description: INTERVENTIONS:  - Assess bowel function  - Encourage oral fluids to ensure adequate hydration  - Administer IV fluids if ordered to ensure adequate hydration  - Administer ordered medications as needed  - Encourage mobilization and activity  - Consider nutritional services referral to assist patient with adequate nutrition and appropriate food choices  10/22/2020 0507 by Macario Harper RN  Outcome: Progressing  10/22/2020 0506 by Macario Harper RN  Outcome: Progressing     Problem: SKIN/TISSUE INTEGRITY - ADULT  Goal: Skin integrity remains intact  Description: INTERVENTIONS  - Identify patients at risk for skin breakdown  - Assess and monitor skin integrity  - Assess and monitor nutrition and hydration status  - Monitor labs (i.e. albumin)  - Assess for incontinence   - Turn and reposition patient  - Assist with mobility/ambulation  - Relieve pressure over bony prominences  - Avoid friction and shearing  - Provide appropriate hygiene as needed including keeping skin clean and dry  - Evaluate need for skin moisturizer/barrier cream  - Collaborate with interdisciplinary team (i.e. Nutrition, Rehabilitation, etc.)   - Patient/family teaching  10/22/2020 0507 by Minnie Morocho RN  Outcome: Progressing  10/22/2020 0506 by Minnie Morocho RN  Outcome: Progressing     Problem: GENITOURINARY - ADULT  Goal: Urinary catheter remains patent  Description: INTERVENTIONS:  - Assess patency of condom catheter     10/22/2020 0507 by Minnie Morocho RN  Outcome: Progressing  10/22/2020 0506 by Minnie Morocho RN  Outcome: Progressing     Problem: CONFUSION/THOUGHT DISTURBANCE  Goal: Thought disturbances (confusion, delirium, depression, dementia or psychosis) are managed to maintain or return to baseline mental status and functional level  Description: INTERVENTIONS:  - Assess for possible contributors to  thought disturbance, including but not limited to medications, infection, impaired vision or hearing, underlying metabolic abnormalities, dehydration, respiratory compromise,  psychiatric diagnoses and notify attending PHYSICAN/AP  - Monitor and intervene to maintain adequate nutrition, hydration, elimination, sleep and activity  - Decrease environmental stimuli, including noise as appropriate. - Provide frequent contacts to provide refocusing, direction and reassurance as needed. Approach patient calmly with eye contact and at their level.   - [place] high risk fall precautions, aspiration precautions and other safety measures, as indicated  - If delirium suspected, notify physician/AP of change in condition and request immediate in-person evaluation  - Pursue consults as appropriate including Geriatric (campus dependent), OT for cognitive evaluation/activity planning, psychiatric, pastoral care, etc.  Outcome: Progressing

## 2020-10-22 NOTE — PLAN OF CARE
Problem: PHYSICAL THERAPY ADULT  Goal: Performs mobility at highest level of function for planned discharge setting. See evaluation for individualized goals. Description: Treatment/Interventions: Functional transfer training, Therapeutic exercise, Endurance training, Patient/family training, Equipment eval/education, Bed mobility, Compensatory technique education, Continued evaluation, Spoke to nursing, OT  Equipment Recommended: (monitor)       See flowsheet documentation for full assessment, interventions and recommendations. Outcome: Not Progressing  Note: Prognosis: Guarded  Problem List: Decreased strength, Decreased endurance, Decreased range of motion, Impaired balance, Decreased mobility, Decreased coordination, Decreased cognition, Impaired judgement, Decreased safety awareness, Impaired sensation, Impaired tone, Decreased skin integrity  Assessment: Pt. supine in bed upon my arrival. Pt. contains to demonstrate limited communication throughout treatment sesssion, requiring constant cueing verbal/tactile for participation. Performance of HEP supine with A of therapist provided for completion. Progressed with transfers requiring A of 2 with cues for  hand placement/technique. Pt. limited by faitgue, requiring quick return to supine in bed. Continue to recommend use of saira lift for OOB mobility at this time. Pt. remained supine in bed with alarm active at end of treatment session. PT will continue to recommend return home with 24* supervision/assist provided when medically stable. PT Discharge Recommendation: Return to previous environment with social support(return home with 24* care)     PT - OK to Discharge: Yes(if d/c when medically stable.)    See flowsheet documentation for full assessment.

## 2020-10-22 NOTE — PROGRESS NOTES
Progress Note - Loly Foster 69 y.o. male MRN: [id number]    Unit/Bed#: E2 -01 Encounter: [id number]    Assessment/Plan:     sepsis    POA and now hemodynamically stable     MRSA bacteremia   1 of 2 cultures, then repeat cultures no growth PICC placement planned vancomycin through 11/14. Echo performed no vegetations     UTI     MRSA,  Complete course of vancomycin     dyslipidemia    continue statin for LDL control     hypertension   control with amlodipine     Diabetes    continue Lantus with ADA diet and sliding scale     Parkinson's disease  appears severe continue  Sinemet and supportive care     GERD    continue PPI for acid        CKD 3    creatinine stable 1.48,  Much improved  resolved DREW from admission     anemia    chronic and stable    Subjective:    mostly nonverbal,  Feels good "beaulieu"      Objective:     Vitals: Blood pressure 138/63, pulse 67, temperature 97.9 °F (36.6 °C), temperature source Temporal, resp. rate 16, weight 62.4 kg (137 lb 9.1 oz), SpO2 97 %. ,Body mass index is 21.55 kg/m². Results from last 7 days   Lab Units 10/20/20  0516  10/15/20  2100   WBC Thousand/uL 8.31   < > 17.47*   HEMOGLOBIN g/dL 9.6*   < > 9.4*   HEMATOCRIT % 31.2*   < > 31.8*   PLATELETS Thousands/uL 285   < > 331   INR   --   --  1.22*    < > = values in this interval not displayed. Results from last 7 days   Lab Units 10/21/20  0428  10/19/20  0443   POTASSIUM mmol/L 4.7   < > 4.1   CHLORIDE mmol/L 104   < > 104   CO2 mmol/L 24   < > 24   BUN mg/dL 27*   < > 19   CREATININE mg/dL 1.48*   < > 1.43*   CALCIUM mg/dL 9.1   < > 9.1   ALK PHOS U/L  --   --  102   ALT U/L  --   --  59   AST U/L  --   --  76*    < > = values in this interval not displayed.        Scheduled Meds:  Current Facility-Administered Medications   Medication Dose Route Frequency Provider Last Rate   • acetaminophen  650 mg Oral Q6H PRN Viviana Wen DO     • amLODIPine  5 mg Oral Daily Mary Hernnadez DO     • atorvastatin  40 mg Oral Daily With Sequent, DO     • carbidopa-levodopa  1 tablet Oral TID Chato Pounds, DO     • heparin (porcine)  5,000 Units Subcutaneous [facility] Kramer Gianfranco, DO     • hydrALAZINE  5 mg Intravenous Q6H PRN Chloe Odor, DO     • influenza vaccine  0.7 mL Intramuscular Once Lee S Prechtel, DO     • insulin glargine  8 Units Subcutaneous HS Krysta Dubon PA-C     • insulin lispro  1-6 Units Subcutaneous 4x Daily (AC & HS) Chato Pounds, DO     • levalbuterol  1.25 mg Nebulization Q6H PRN Chato Pounds, DO     • ondansetron  4 mg Intravenous Q6H PRN Chato Pounds, DO     • pantoprazole  40 mg Intravenous Q24H [facility] Charley Tran PA-C     • senna-docusate sodium  1 tablet Oral BID Lenell Gentle, DO     • traZODone  150 mg Oral HS Chato Pounds, DO     • vancomycin  750 mg Intravenous Q12H Yazmin Villar  mg (10/21/20 1634)       Continuous Infusions:     Physical exam:  General appearance:  Alert no distress poor interaction  Head/Eyes:   nonicteric PERRL EOMI  Neck:  supple  Lungs: CTA bilateral no wheezing rhonchi or rales  Heart: normal S1 S2 regular  Abdomen:  Decreased BS nontender with bowel sounds  Extremities: no edema  Skin: no rash    Invasive Devices     Drain            External Urinary Catheter 1 day                  VTE Pharmacologic Prophylaxis:  heparin        Counseling / Coordination of Care  Total floor / unit time spent today 30  minutes. Greater than 50% of total time was spent with the patient and / or family counseling and / or coordination of care.   A description of the counseling / coordination of care:  Discussed with IR will place PICC today

## 2020-10-22 NOTE — PROGRESS NOTES
NEPHROLOGY PROGRESS NOTE   Chalo Lopesorel 69 y.o. male MRN: [id number]  Unit/Bed#: E2 -01 Encounter: [id number]      ASSESSMENT & PLAN     1. Acute Kidney Injury was present on admission and this has resolved  ? Etiology was likely pre renal azotemia with a component of ATN while Ace inhibition  ? ACE-inhibitor on hold and received IV fluids and creatinine improved now to baseline  ? Will monitor off IV fluids  ? Monitor antibiotic levels-pharmacy  ? Medications otherwise appropriately dosed  ? Urinalysis shows a specific gravity 1.025, 2+ proteinuria this should be worked up as an outpatient  ? CT of the abdomen and pelvis on October 15th does show extensive renal vascular calcification but no evidence of nephrolithiasis or obstructive uropathy  ? creatinine stable no labs drawn today  2. Electrolytes:  ? Electrolytes currently acceptable  3. Acid/Base:  ? Now anion gap and bicarb stable  4. BP/HR:  ? Hypertension-  blood pressure is slightly elevated today isolated but if remains elevated can up titrate amlodipine dose  5. Volume Status  ? Appears euvolemic  6. Anemia  ? Normocytic anemia in the setting of chronic kidney disease  ? Should have workup done  ? Will hold on checking iron stores in the setting of being treated for infection  7. MBD  ? Check phosphorus and PTH as an outpatient  8. Health Maintanance/Risk Reduction  ?  Sepsis secondary to pneumonia UTI-has a staph aureus bacteremia-being treated with vancomycin will need long-term antibiotic  ?  mental status at baseline    SUBJECTIVE:    Patient was seen today  Overall stable  No acute complaints  Mostly nonverbal    OBJECTIVE:  Current Weight: Weight - Scale: 62.4 kg (137 lb 9.1 oz)  Vitals:    10/22/20 0700   BP: 170/78   Pulse: 76   Resp: 16   Temp: 97.7 °F (36.5 °C)   SpO2: 95%       Intake/Output Summary (Last 24 hours) at 10/22/2020 1336  Last data filed at 10/22/2020 0701  Gross per 24 hour   Intake 240 ml   Output 977 ml Net -737 ml     Weight (last 2 days)     Date/Time   Weight    10/22/20 0543   62.4 (137.57)    10/21/20 0542   62.7 (138.23)    10/20/20 0600   62.3 (137.35)              General: conscious, cooperative, in not acute distress  Eyes: conjunctivae pink, anicteric sclerae  ENT: lips and mucous membranes moist  Neck: supple, no JVD  Chest: clear breath sounds bilateral, no crackles, ronchus or wheezings  CVS: distinct S1 & S2, normal rate, regular rhythm  Abdomen: soft, non-tender, non-distended, normoactive bowel sounds  Extremities: no edema of both legs  Skin: no rash  Neuro: awake, alert, oriented      Medications:    Current Facility-Administered Medications:   •  acetaminophen (TYLENOL) tablet 650 mg, 650 mg, Oral, Q6H PRN, Kylee Boyd, DO  •  amLODIPine (NORVASC) tablet 5 mg, 5 mg, Oral, Daily, Kelvinpaul Vazquez DO, 5 mg at 10/22/20 1067  •  atorvastatin (LIPITOR) tablet 40 mg, 40 mg, Oral, Daily With Dinner, Young Medel DO, 40 mg at 10/21/20 1644  •  carbidopa-levodopa (SINEMET)  mg per tablet 1 tablet, 1 tablet, Oral, TID, Kylee Boyd DO, 1 tablet at 10/22/20 2533  •  heparin (porcine) subcutaneous injection 5,000 Units, 5,000 Units, Subcutaneous, Q8H [address], Young Medel, DO, 5,000 Units at 10/21/20 1539  •  hydrALAZINE (APRESOLINE) injection 5 mg, 5 mg, Intravenous, Q6H PRN, Arias Jacome, DO  •  influenza vaccine, high-dose (FLUZONE HIGH-DOSE) IM injection JOSE 0.7 mL, 0.7 mL, Intramuscular, Once, Lee Valle, DO  •  insulin glargine (LANTUS) subcutaneous injection 10 Units 0.1 mL, 10 Units, Subcutaneous, HS, Mark Vinson, DO  •  insulin lispro (HumaLOG) 100 units/mL subcutaneous injection 1-6 Units, 1-6 Units, Subcutaneous, 4x Daily (AC & HS), 1 Units at 10/21/20 1652 **AND** Fingerstick Glucose (POCT), , , 4x Daily AC and at bedtime, Kylee Boyd, DO  •  levalbuterol (XOPENEX) inhalation solution 1.25 mg, 1.25 mg, Nebulization, Q6H PRN, Kylee Boyd, DO  •  ondansetron (ZOFRAN) injection 4 mg, 4 mg, Intravenous, Q6H PRN, Viviana Wen DO  •  pantoprazole (PROTONIX) injection 40 mg, 40 mg, Intravenous, Q24H [address], Troy Birch PA-C, 40 mg at 10/21/20 0550  •  senna-docusate sodium (SENOKOT S) 8.6-50 mg per tablet 1 tablet, 1 tablet, Oral, BID, Leann Birch DO, 1 tablet at 10/22/20 7865  •  traZODone (DESYREL) tablet 150 mg, 150 mg, Oral, HS, Young Medel DO, 150 mg at 10/19/20 2353  •  vancomycin (VANCOCIN) IVPB (premix in dextrose) 750 mg 150 mL, 750 mg, Intravenous, Q12H, Verner Queen, MD, Last Rate: 150 mL/hr at 10/21/20 1645, 750 mg at 10/21/20 1645    Invasive Devices:      Lab Results:   Results from last 7 days   Lab Units 10/21/20  0428 10/20/20  0516 10/19/20  0443 10/18/20  1627 10/18/20  1626 10/18/20  0907 10/17/20  0728 10/16/20  0502 10/15/20  2302 10/15/20  2105 10/15/20  2100  10/15/20  2055   WBC Thousand/uL  --  8.31 10.61*  --   --  11.15* 12.76* 17.23*  --   --  17.47*   < >  --    HEMOGLOBIN g/dL  --  9.6* 9.2*  --   --  8.7* 8.2* 9.0*  --   --  9.4*   < >  --    HEMATOCRIT %  --  31.2* 30.9*  --   --  30.0* 27.7* 31.0*  --   --  31.8*   < >  --    PLATELETS Thousands/uL  --  285 234  --   --  211 242 283  --   --  331   < >  --    POTASSIUM mmol/L 4.7 5.2 4.1  --   --  3.8 4.1 5.0  --   --  5.1   < >  --    CHLORIDE mmol/L 104 103 104  --   --  105 112* 114*  --   --  110*   < >  --    CO2 mmol/L 24 25 24  --   --  24 26 19*  --   --  23   < >  --    BUN mg/dL 27* 18 19  --   --  25 42* 68*  --   --  70*   < >  --    CREATININE mg/dL 1.48* 1.18 1.43*  --   --  1.41* 1.61* 2.40*  --   --  2.86*   < >  --    CALCIUM mg/dL 9.1 9.1 9.1  --   --  8.5 9.0 9.2  --   --  9.8   < >  --    MAGNESIUM mg/dL  --   --   --   --   --   --  1.7  --   --   --   --   --   --    ALK PHOS U/L  --   --  102  --   --  93  --  106  --   --  117*  --   --    ALT U/L  --   --  59  --   --  87*  --  150*  --   --  133*  --   --    AST U/L  --   --  76*  --   --  87*  --  89*  --   --  130*  --   -- BLOOD CULTURE   --   --   --  No Growth at 72 hrs. No Growth at 72 hrs.  --   --   --   --  No Growth After 5 Days. --   --  Methicillin Resistant Staphylococcus aureus*   LEUKOCYTES UA   --   --   --   --   --   --   --   --  Large*  --   --   --   --    BLOOD UA   --   --   --   --   --   --   --   --  Small*  --   --   --   --     < > = values in this interval not displayed.        Previous work up:  Please see previous notes

## 2020-10-22 NOTE — PROGRESS NOTES
Vancomycin IV Pharmacy-to-Dose Consultation    Sania Michaud is a 69 y.o. male who is currently receiving Vancomycin IV with management by the Pharmacy Consult service. Assessment/Plan:  The patient was reviewed. Renal function is stable and no signs or symptoms of nephrotoxicity and/or infusion reactions were documented in the chart. Based on today’s assessment, continue current vancomycin (day # 5) dosing of 750mg IV every 12hrs, with a plan for trough to be drawn at 0445 on 10/24/20. Pt lost IV access on 10/22 and AM dose was missed. We will continue to follow the patient’s culture results and clinical progress daily.     Caterina Morgan, Pharmacist

## 2020-10-22 NOTE — PROGRESS NOTES
E I have a HIDA walked in [facility] Note - Infectious Disease   Milana Foster 69 y.o. male MRN: [id number]  Unit/Bed#: E2 -01 Encounter: [id number]      A/P:  1. Sepsis, POA.  Improved. 2. MRSA bacteremia.  One of 2 blood cultures positive.  No intravascular prosthetic devices. Negative CXR, CT A/P, 2D echo(technically difficult study).  Repeat blood cultures are negative. 3. Possible MRSA UTI.  No chronic urinary catheter.  Consider hematogenous spread. 4. Acute kidney injury, on CKD.  Nephrology input noted. Creatinine remains stable. 5. Diabetes mellitus type 2 with hyperglycemia.  Hemoglobin A1c is 7.7. 6. Dementia.     Patient is doing well on IV vancomycin with negative repeat blood cultures. Continue IV vancomycin with dosing recommendations per pharmacy. Follow up pending trough ordered for a.m. of 10/23. Plan for 4 week course of IV antibiotic from negative blood cultures, through . Okay for PICC line placement as repeat blood cultures are negative at 72 hours.  Check weekly CBC with diff, creatinine, vancomycin trough. PICC will need to be discontinued after last dose of IV antibiotic. Check surveillance blood cultures 2 weeks after completion of antibiotic.  Outpatient ID follow-up as scheduled. Subjective:  Patient is not a poor historian secondary to dementia. Remains afebrile. No reported acute events.     Objective:  Vitals:  Temp:  [97.7 °F (36.5 °C)-97.9 °F (36.6 °C)] 97.7 °F (36.5 °C)  HR:  [67-76] 76  Resp:  [16] 16  BP: (138-170)/(63-78) 170/78  SpO2:  [95 %-97 %] 95 %  Temp (24hrs), Av.8 °F (36.6 °C), Min:97.7 °F (36.5 °C), Max:97.9 °F (36.6 °C)  Current: Temperature: 97.7 °F (36.5 °C)    Physical Exam:   General:  No acute distress, nontoxic  Psychiatric:  Awake and alert  Pulmonary:  Normal respiratory excursion without accessory muscle use  Abdomen:  Soft, nontender  Extremities:  No edema  Skin:  No rashes    Lab Results:  I have personally reviewed pertinent labs. Results from last 7 days   Lab Units 10/21/20  0428 10/20/20  0516 10/19/20  0443 10/18/20  0907  10/16/20  0502   POTASSIUM mmol/L 4.7 5.2 4.1 3.8   < > 5.0   CHLORIDE mmol/L 104 103 104 105   < > 114*   CO2 mmol/L 24 25 24 24   < > 19*   BUN mg/dL 27* 18 19 25   < > 68*   CREATININE mg/dL 1.48* 1.18 1.43* 1.41*   < > 2.40*   EGFR ml/min/1.73sq m 47 62 49 50   < > 26   CALCIUM mg/dL 9.1 9.1 9.1 8.5   < > 9.2   AST U/L  --   --  76* 87*  --  89*   ALT U/L  --   --  59 87*  --  150*   ALK PHOS U/L  --   --  102 93  --  106    < > = values in this interval not displayed. Results from last 7 days   Lab Units 10/20/20  0516 10/19/20  0443 10/18/20  0907   WBC Thousand/uL 8.31 10.61* 11.15*   HEMOGLOBIN g/dL 9.6* 9.2* 8.7*   PLATELETS Thousands/uL 285 234 211     Results from last 7 days   Lab Units 10/18/20  1627 10/18/20  1626 10/16/20  0435 10/15/20  2302 10/15/20  2105 10/15/20  2055   BLOOD CULTURE  No Growth at 72 hrs. No Growth at 72 hrs.  --   --  No Growth After 5 Days. Methicillin Resistant Staphylococcus aureus*   GRAM STAIN RESULT   --   --   --   --   --  Gram positive cocci in clusters*   URINE CULTURE   --   --   --  >100,000 cfu/ml Methicillin Resistant Staphylococcus aureus*  --   --    LEGIONELLA URINARY ANTIGEN   --   --  Negative  --   --   --        Imaging Studies:   I have personally reviewed pertinent imaging study reports and images in PACS. EKG, Pathology, and Other Studies:   I have personally reviewed pertinent reports.

## 2020-10-22 NOTE — PLAN OF CARE
Problem: Potential for Falls  Goal: Patient will remain free of falls  Description: INTERVENTIONS:  - Assess patient frequently for physical needs  -  Identify cognitive and physical deficits and behaviors that affect risk of falls. -  [place] fall precautions as indicated by assessment.  - Educate patient/family on patient safety including physical limitations  - Instruct patient to call for assistance with activity based on assessment  - Modify environment to reduce risk of injury  - Consider OT/PT consult to assist with strengthening/mobility  Outcome: Progressing     Problem: SAFETY ADULT  Goal: Patient will remain free of falls  Description: INTERVENTIONS:  - Assess patient frequently for physical needs  -  Identify cognitive and physical deficits and behaviors that affect risk of falls.   -  [place] fall precautions as indicated by assessment.  - Educate patient/family on patient safety including physical limitations  - Instruct patient to call for assistance with activity based on assessment  - Modify environment to reduce risk of injury  - Consider OT/PT consult to assist with strengthening/mobility  Outcome: Progressing

## 2020-10-22 NOTE — PHYSICAL THERAPY NOTE
Physical Therapy Progress Note     10/22/20 0939   Pain Assessment   Pain Assessment Tool FLACC   Pain Rating: FLACC (Rest) - Face 0   Pain Rating: FLACC (Rest) - Legs 0   Pain Rating: FLACC (Rest) - Activity 0   Pain Rating: FLACC (Rest) - Cry 0   Pain Rating: FLACC (Rest) - Consolability 0   Score: FLACC (Rest) 0   Pain Rating: FLACC (Activity) - Face 0   Pain Rating: FLACC (Activity) - Legs 0   Pain Rating: FLACC (Activity) - Activity 0   Pain Rating: FLACC (Activity) - Cry 0   Pain Rating: FLACC (Activity) - Consolability 0   Score: FLACC (Activity) 0   Restrictions/Precautions   Weight Bearing Precautions Per Order No   Other Precautions Cognitive; Chair Alarm; Bed Alarm; Fall Risk;Contact/isolation   General   Chart Reviewed Yes   Response to Previous Treatment Patient unable to report, no changes reported from family or staff   Family/Caregiver Present No   Subjective   Subjective "Okay."   Bed Mobility   Rolling R 1  Dependent   Additional items Assist x 2;HOB elevated; Bedrails;Leg ; Increased time required;Verbal cues;LE management   Rolling L 1  Dependent   Additional items Assist x 2;Bedrails;HOB elevated;Leg ; Increased time required;LE management;Verbal cues   Supine to Sit 1  Dependent   Additional items Assist x 2;Bedrails;Leg ; Increased time required;Verbal cues;LE management   Sit to Supine 1  Dependent   Additional items Assist x 2;HOB elevated; Bedrails; Increased time required;Leg ;Verbal cues;LE management   Transfers   Sit to Stand Unable to assess   Ambulation/Elevation   Gait pattern Not appropriate  (nonambulatory at baseline per chart. )   Balance   Static Sitting Fair -   Dynamic Sitting Poor   Endurance Deficit   Endurance Deficit Yes   Endurance Deficit Description weakness/fatigue   Activity Tolerance   Activity Tolerance Patient limited by fatigue   Nurse Made Aware Yes   Exercises   THR Supine;10 reps;Bilateral;PROM   Assessment   Prognosis Guarded   Problem List Decreased strength;Decreased endurance;Decreased range of motion; Impaired balance;Decreased mobility; Decreased coordination;Decreased cognition; Impaired judgement;Decreased safety awareness; Impaired sensation; Impaired tone;Decreased skin integrity   Assessment Pt. supine in bed upon my arrival. Pt. contains to demonstrate limited communication throughout treatment sesssion, requiring constant cueing verbal/tactile for participation. Performance of HEP supine with A of therapist provided for completion. Progressed with transfers requiring A of 2 with cues for  hand placement/technique. Pt. limited by faitgue, requiring quick return to supine in bed. Continue to recommend use of saira lift for OOB mobility at this time. Pt. remained supine in bed with alarm active at end of treatment session. PT will continue to recommend return home with 24* supervision/assist provided when medically stable. Goals   Patient Goals None stated. STG Expiration Date 10/26/20   PT Treatment Day 1   Plan   Treatment/Interventions Functional transfer training;LE strengthening/ROM; Therapeutic exercise; Endurance training;Bed mobility;Spoke to nursing;Spoke to case management   Progress Slow progress, decreased activity tolerance   PT Frequency 1-2x/wk   Recommendation   PT Discharge Recommendation Return to previous environment with social support  (return home with 24* care)   Equipment Recommended Other (Comment)  (saira for OOB)   PT - OK to Discharge Yes  (if d/c when medically stable.)     Blair Saint, PTA

## 2020-10-23 ENCOUNTER — APPOINTMENT (INPATIENT)
Dept: RADIOLOGY | Facility: HOSPITAL | Age: 70
DRG: 871 | End: 2020-10-23
Payer: COMMERCIAL

## 2020-10-23 ENCOUNTER — TELEPHONE (OUTPATIENT)
Dept: NEPHROLOGY | Facility: CLINIC | Age: 70
End: 2020-10-23

## 2020-10-23 LAB
BACTERIA BLD CULT: NORMAL
BACTERIA BLD CULT: NORMAL
GLUCOSE SERPL-MCNC: 119 MG/DL (ref 65–140)
GLUCOSE SERPL-MCNC: 153 MG/DL (ref 65–140)
GLUCOSE SERPL-MCNC: 192 MG/DL (ref 65–140)
GLUCOSE SERPL-MCNC: 248 MG/DL (ref 65–140)

## 2020-10-23 PROCEDURE — 99233 SBSQ HOSP IP/OBS HIGH 50: CPT | Performed by: INTERNAL MEDICINE

## 2020-10-23 PROCEDURE — 82948 REAGENT STRIP/BLOOD GLUCOSE: CPT

## 2020-10-23 PROCEDURE — 99232 SBSQ HOSP IP/OBS MODERATE 35: CPT | Performed by: PHYSICIAN ASSISTANT

## 2020-10-23 PROCEDURE — C9113 INJ PANTOPRAZOLE SODIUM, VIA: HCPCS | Performed by: PHYSICIAN ASSISTANT

## 2020-10-23 PROCEDURE — 99232 SBSQ HOSP IP/OBS MODERATE 35: CPT | Performed by: INTERNAL MEDICINE

## 2020-10-23 PROCEDURE — NC001 PR NO CHARGE: Performed by: RADIOLOGY

## 2020-10-23 RX ORDER — LORAZEPAM 1 MG/1
1 TABLET ORAL ONCE
Status: COMPLETED | OUTPATIENT
Start: 2020-10-23 | End: 2020-10-23

## 2020-10-23 RX ORDER — LORAZEPAM 2 MG/ML
1 INJECTION INTRAMUSCULAR ONCE AS NEEDED
Status: DISCONTINUED | OUTPATIENT
Start: 2020-10-23 | End: 2020-10-23

## 2020-10-23 RX ORDER — AMLODIPINE BESYLATE 5 MG/1
5 TABLET ORAL DAILY
Qty: 30 TABLET | Refills: 0 | Status: SHIPPED | OUTPATIENT
Start: 2020-10-24

## 2020-10-23 RX ORDER — ROSUVASTATIN CALCIUM 20 MG/1
20 TABLET, COATED ORAL DAILY
Qty: 30 TABLET | Refills: 0 | Status: SHIPPED | OUTPATIENT
Start: 2020-10-23 | End: 2020-11-22

## 2020-10-23 RX ADMIN — PANTOPRAZOLE SODIUM 40 MG: 40 INJECTION, POWDER, FOR SOLUTION INTRAVENOUS at 05:29

## 2020-10-23 RX ADMIN — VANCOMYCIN HYDROCHLORIDE 750 MG: 750 INJECTION, SOLUTION INTRAVENOUS at 20:08

## 2020-10-23 RX ADMIN — DOCUSATE SODIUM AND SENNOSIDES 1 TABLET: 8.6; 5 TABLET, FILM COATED ORAL at 09:00

## 2020-10-23 RX ADMIN — ATORVASTATIN CALCIUM 40 MG: 40 TABLET, FILM COATED ORAL at 16:16

## 2020-10-23 RX ADMIN — TRAZODONE HYDROCHLORIDE 150 MG: 100 TABLET ORAL at 21:32

## 2020-10-23 RX ADMIN — CARBIDOPA AND LEVODOPA 1 TABLET: 25; 100 TABLET ORAL at 21:32

## 2020-10-23 RX ADMIN — LORAZEPAM 1 MG: 1 TABLET ORAL at 14:03

## 2020-10-23 RX ADMIN — INSULIN LISPRO 1 UNITS: 100 INJECTION, SOLUTION INTRAVENOUS; SUBCUTANEOUS at 11:55

## 2020-10-23 RX ADMIN — INSULIN LISPRO 3 UNITS: 100 INJECTION, SOLUTION INTRAVENOUS; SUBCUTANEOUS at 16:16

## 2020-10-23 RX ADMIN — CARBIDOPA AND LEVODOPA 1 TABLET: 25; 100 TABLET ORAL at 18:14

## 2020-10-23 RX ADMIN — DOCUSATE SODIUM AND SENNOSIDES 1 TABLET: 8.6; 5 TABLET, FILM COATED ORAL at 18:14

## 2020-10-23 RX ADMIN — VANCOMYCIN HYDROCHLORIDE 750 MG: 750 INJECTION, SOLUTION INTRAVENOUS at 05:29

## 2020-10-23 RX ADMIN — HEPARIN SODIUM 5000 UNITS: 5000 INJECTION INTRAVENOUS; SUBCUTANEOUS at 05:45

## 2020-10-23 RX ADMIN — INSULIN GLARGINE 10 UNITS: 100 INJECTION, SOLUTION SUBCUTANEOUS at 21:32

## 2020-10-23 RX ADMIN — AMLODIPINE BESYLATE 5 MG: 5 TABLET ORAL at 09:00

## 2020-10-23 RX ADMIN — INSULIN LISPRO 1 UNITS: 100 INJECTION, SOLUTION INTRAVENOUS; SUBCUTANEOUS at 21:33

## 2020-10-23 RX ADMIN — CARBIDOPA AND LEVODOPA 1 TABLET: 25; 100 TABLET ORAL at 09:00

## 2020-10-23 NOTE — PROGRESS NOTES
Progress Note - Infectious Disease   Susannah Peggyri Marianneorel 69 y.o. male MRN: [id number]  Unit/Bed#: E2 -01 Encounter: [id number]      A/P:  1. Sepsis, POA.  Improved. 2. MRSA bacteremia.  One of 2 blood cultures positive.  No intravascular prosthetic devices. Negative CXR, CT A/P, 2D echo(technically difficult study).  Repeat blood cultures are negative. 3. Possible MRSA UTI.  No chronic urinary catheter.  Consider hematogenous spread. 4. Acute kidney injury, on CKD.  Nephrology input noted.  Creatinine remains stable. 5. Diabetes mellitus type 2 with hyperglycemia.  Hemoglobin A1c is 7.7. 6. Dementia. Patient clinically improved on IV vancomycin. Bacteremia quickly cleared. Continue IV vancomycin with dosing recommendations per pharmacy. Trough ordered for this morning was not obtained. Unfortunately, patient pulled out his PICC line. PICC team has been notified. Patient will need 4 week course of IV antibiotics from negative blood cultures, through . Check weekly CBC with diff, creatinine, vancomycin trough. Check surveillance blood cultures 2 weeks after completion of IV antibiotic. Outpatient ID follow-up as scheduled. I discussed above plan with Gallo Jay from Internal Medicine Service.         Subjective:  Patient pulled out his PICC line. He was due to go to rehab later today. Patient remains poor historian secondary to dementia. No fevers. He also missed 1 dose of vancomycin yesterday due to loss of IV access. Trough scheduled for this morning was not obtained.     Objective:  Vitals:  Temp:  [96.6 °F (35.9 °C)-98.1 °F (36.7 °C)] 96.6 °F (35.9 °C)  HR:  [62-67] 65  Resp:  [16-20] 17  BP: (128-151)/(62-67) 151/67  SpO2:  [98 %-100 %] 99 %  Temp (24hrs), Av.2 °F (36.2 °C), Min:96.6 °F (35.9 °C), Max:98.1 °F (36.7 °C)  Current: Temperature: (!) 96.6 °F (35.9 °C)    Physical Exam:   General:  No acute distress, nontoxic  HEENT:  Atraumatic normocephalic  Psychiatric: Awake and alert  Pulmonary:  Normal respiratory excursion without accessory muscle use  PICC line exit site with no surrounding erythema or drainage  Abdomen:  Soft, nontender  Extremities:  No edema  Skin:  No rashes  Neuro: Moves all extremities spontaneously    Lab Results:  I have personally reviewed pertinent labs. Results from last 7 days   Lab Units 10/21/20  0428 10/20/20  0516 10/19/20  0443 10/18/20  0907   POTASSIUM mmol/L 4.7 5.2 4.1 3.8   CHLORIDE mmol/L 104 103 104 105   CO2 mmol/L 24 25 24 24   BUN mg/dL 27* 18 19 25   CREATININE mg/dL 1.48* 1.18 1.43* 1.41*   EGFR ml/min/1.73sq m 47 62 49 50   CALCIUM mg/dL 9.1 9.1 9.1 8.5   AST U/L  --   --  76* 87*   ALT U/L  --   --  59 87*   ALK PHOS U/L  --   --  102 93     Results from last 7 days   Lab Units 10/20/20  0516 10/19/20  0443 10/18/20  0907   WBC Thousand/uL 8.31 10.61* 11.15*   HEMOGLOBIN g/dL 9.6* 9.2* 8.7*   PLATELETS Thousands/uL 285 234 211     Results from last 7 days   Lab Units 10/18/20  1627 10/18/20  1626   BLOOD CULTURE  No Growth After 4 Days. No Growth After 4 Days. Imaging Studies:   I have personally reviewed pertinent imaging study reports and images in PACS. EKG, Pathology, and Other Studies:   I have personally reviewed pertinent reports.

## 2020-10-23 NOTE — ASSESSMENT & PLAN NOTE
· Lisinopril held on admission given DREW  · Nephrology recommending NOT restarting ACE-I on discharge   · Started on amlodipine, increased to 5 mg once daily with improvement in BP   · outpatient follow up with PCP

## 2020-10-23 NOTE — QUICK NOTE
Patient arrived for PICC placement. He was given Ativan prior to arrival.    1st attempted by staff, patient unable to understand and tried to grab the    He then position himself in a fetal position, we attempted to calm him down    I then personally attempted access and he pushed my arm away with the lidocaine needle in hand    It is not safe for us to perform PICC placement at this time. We will return the patient to the floor. I will communicate this to the primary team.    Recommend evaluation for clinical appropriateness of durable IV access in this patient who is not allowing for safe access and cannot maintain an access. Perhaps there is an oral antibiotic that may help manage his situation.

## 2020-10-23 NOTE — PLAN OF CARE
Problem: Potential for Falls  Goal: Patient will remain free of falls  Description: INTERVENTIONS:  - Assess patient frequently for physical needs  -  Identify cognitive and physical deficits and behaviors that affect risk of falls.   -  [place] fall precautions as indicated by assessment.  - Educate patient/family on patient safety including physical limitations  - Instruct patient to call for assistance with activity based on assessment  - Modify environment to reduce risk of injury  - Consider OT/PT consult to assist with strengthening/mobility  Outcome: Progressing     Problem: Prexisting or High Potential for Compromised Skin Integrity  Goal: Skin integrity is maintained or improved  Description: INTERVENTIONS:  - Identify patients at risk for skin breakdown  - Assess and monitor skin integrity  - Assess and monitor nutrition and hydration status  - Monitor labs   - Assess for incontinence   - Turn and reposition patient  - Assist with mobility/ambulation  - Relieve pressure over bony prominences  - Avoid friction and shearing  - Provide appropriate hygiene as needed including keeping skin clean and dry  - Evaluate need for skin moisturizer/barrier cream  - Collaborate with interdisciplinary team   - Patient/family teaching  - Consider wound care consult   Outcome: Progressing     Problem: DISCHARGE PLANNING - CARE MANAGEMENT  Goal: Discharge to post-acute care or home with appropriate resources  Description: INTERVENTIONS:  - Conduct assessment to determine patient/family and health care team treatment goals, and need for post-acute services based on payer coverage, community resources, and patient preferences, and barriers to discharge  - Address psychosocial, clinical, and financial barriers to discharge as identified in assessment in conjunction with the patient/family and health care team  - Arrange appropriate level of post-acute services according to patient's   needs and preference and payer coverage in collaboration with the physician and health care team  - Communicate with and update the patient/family, physician, and health care team regarding progress on the discharge plan  - Arrange appropriate transportation to post-acute venues  Outcome: Progressing     Problem: INFECTION - ADULT  Goal: Absence or prevention of progression during hospitalization  Description: INTERVENTIONS:  - Assess and monitor for signs and symptoms of infection  - Monitor lab/diagnostic results  - Monitor all insertion sites, i.e. indwelling lines, tubes, and drains  - Monitor endotracheal if appropriate and nasal secretions for changes in amount and color  - [place] appropriate cooling/warming therapies per order  - Administer medications as ordered  - Instruct and encourage patient and family to use good hand hygiene technique  - Identify and instruct in appropriate isolation precautions for identified infection/condition  Outcome: Progressing  Goal: Absence of fever/infection during neutropenic period  Description: INTERVENTIONS:  - Monitor WBC    Outcome: Progressing     Problem: SAFETY ADULT  Goal: Patient will remain free of falls  Description: INTERVENTIONS:  - Assess patient frequently for physical needs  -  Identify cognitive and physical deficits and behaviors that affect risk of falls.   -  [place] fall precautions as indicated by assessment.  - Educate patient/family on patient safety including physical limitations  - Instruct patient to call for assistance with activity based on assessment  - Modify environment to reduce risk of injury  - Consider OT/PT consult to assist with strengthening/mobility  Outcome: Progressing  Goal: Maintain or return to baseline ADL function  Description: INTERVENTIONS:  -  Assess patient's ability to carry out ADLs; assess patient's baseline for ADL function and identify physical deficits which impact ability to perform ADLs (bathing, care of mouth/teeth, toileting, grooming, dressing, etc.)  - Assess/evaluate cause of self-care deficits   - Assess range of motion  - Assess patient's mobility; develop plan if impaired  - Assess patient's need for assistive devices and provide as appropriate  - Encourage maximum independence but intervene and supervise when necessary  - Involve family in performance of ADLs  - Assess for home care needs following discharge   - Consider OT consult to assist with ADL evaluation and planning for discharge  - Provide patient education as appropriate  Outcome: Progressing  Goal: Maintain or return mobility status to optimal level  Description: INTERVENTIONS:  - Assess patient's baseline mobility status (ambulation, transfers, stairs, etc.)    - Identify cognitive and physical deficits and behaviors that affect mobility  - Identify mobility aids required to assist with transfers and/or ambulation (gait belt, sit-to-stand, lift, walker, cane, etc.)  - [place] fall precautions as indicated by assessment  - Record patient progress and toleration of activity level on Mobility SBAR; progress patient to next Phase/Stage  - Instruct patient to call for assistance with activity based on assessment  - Consider rehabilitation consult to assist with strengthening/weightbearing, etc.  Outcome: Progressing     Problem: DISCHARGE PLANNING  Goal: Discharge to home or other facility with appropriate resources  Description: INTERVENTIONS:  - Identify barriers to discharge w/patient and caregiver  - Arrange for needed discharge resources and transportation as appropriate  - Identify discharge learning needs (meds, wound care, etc.)  - Arrange for interpretive services to assist at discharge as needed  - Refer to Case Management Department for coordinating discharge planning if the patient needs post-hospital services based on physician/advanced practitioner order or complex needs related to functional status, cognitive ability, or social support system  Outcome: Progressing     Problem: Knowledge Deficit  Goal: Patient/family/caregiver demonstrates understanding of disease process, treatment plan, medications, and discharge instructions  Description: Complete learning assessment and assess knowledge base. Interventions:  - Provide teaching at level of understanding  - Provide teaching via preferred learning methods  Outcome: Progressing     Problem: Nutrition/Hydration-ADULT  Goal: Nutrient/Hydration intake appropriate for improving, restoring or maintaining nutritional needs  Description: Monitor and assess patient's nutrition/hydration status for malnutrition. Collaborate with interdisciplinary team and initiate plan and interventions as ordered. Monitor patient's weight and dietary intake as ordered or per policy. Utilize nutrition screening tool and intervene as necessary. Determine patient's food preferences and provide high-protein, high-caloric foods as appropriate.      INTERVENTIONS:  - Monitor oral intake, urinary output, labs, and treatment plans  - Assess nutrition and hydration status and recommend course of action  - Evaluate amount of meals eaten  - Assist patient with eating if necessary   - Allow adequate time for meals  - Recommend/ encourage appropriate diets, oral nutritional supplements, and vitamin/mineral supplements  - Order, calculate, and assess calorie counts as needed  - Recommend, monitor, and adjust tube feedings and TPN/PPN based on assessed needs  - Assess need for intravenous fluids  - Provide specific nutrition/hydration education as appropriate  - Include patient/family/caregiver in decisions related to nutrition  Outcome: Progressing     Problem: GASTROINTESTINAL - ADULT  Goal: Maintains or returns to baseline bowel function  Description: INTERVENTIONS:  - Assess bowel function  - Encourage oral fluids to ensure adequate hydration  - Administer IV fluids if ordered to ensure adequate hydration  - Administer ordered medications as needed  - Encourage mobilization and activity  - Consider nutritional services referral to assist patient with adequate nutrition and appropriate food choices  Outcome: Progressing     Problem: SKIN/TISSUE INTEGRITY - ADULT  Goal: Skin integrity remains intact  Description: INTERVENTIONS  - Identify patients at risk for skin breakdown  - Assess and monitor skin integrity  - Assess and monitor nutrition and hydration status  - Monitor labs (i.e. albumin)  - Assess for incontinence   - Turn and reposition patient  - Assist with mobility/ambulation  - Relieve pressure over bony prominences  - Avoid friction and shearing  - Provide appropriate hygiene as needed including keeping skin clean and dry  - Evaluate need for skin moisturizer/barrier cream  - Collaborate with interdisciplinary team (i.e. Nutrition, Rehabilitation, etc.)   - Patient/family teaching  Outcome: Progressing     Problem: GENITOURINARY - ADULT  Goal: Urinary catheter remains patent  Description: INTERVENTIONS:  - Assess patency of condom catheter     Outcome: Progressing     Problem: CONFUSION/THOUGHT DISTURBANCE  Goal: Thought disturbances (confusion, delirium, depression, dementia or psychosis) are managed to maintain or return to baseline mental status and functional level  Description: INTERVENTIONS:  - Assess for possible contributors to  thought disturbance, including but not limited to medications, infection, impaired vision or hearing, underlying metabolic abnormalities, dehydration, respiratory compromise,  psychiatric diagnoses and notify attending PHYSICAN/AP  - Monitor and intervene to maintain adequate nutrition, hydration, elimination, sleep and activity  - Decrease environmental stimuli, including noise as appropriate. - Provide frequent contacts to provide refocusing, direction and reassurance as needed. Approach patient calmly with eye contact and at their level.   - [place] high risk fall precautions, aspiration precautions and other safety measures, as indicated  - If delirium suspected, notify physician/AP of change in condition and request immediate in-person evaluation  - Pursue consults as appropriate including Geriatric (campus dependent), OT for cognitive evaluation/activity planning, psychiatric, pastoral care, etc.  Outcome: Progressing

## 2020-10-23 NOTE — CASE MANAGEMENT
This case management assistant (CMA) was instructed by the assigned  to mail the patient's IMM home to his son via certified mail on 04/82/7308, because the patient is confused.  The documents were mailed to the patient's home address listed on file but addressed to the son at 12:48 PM.

## 2020-10-23 NOTE — PLAN OF CARE
Problem: Potential for Falls  Goal: Patient will remain free of falls  Description: INTERVENTIONS:  - Assess patient frequently for physical needs  -  Identify cognitive and physical deficits and behaviors that affect risk of falls.   -  [place] fall precautions as indicated by assessment.  - Educate patient/family on patient safety including physical limitations  - Instruct patient to call for assistance with activity based on assessment  - Modify environment to reduce risk of injury  - Consider OT/PT consult to assist with strengthening/mobility  Outcome: Progressing     Problem: Prexisting or High Potential for Compromised Skin Integrity  Goal: Skin integrity is maintained or improved  Description: INTERVENTIONS:  - Identify patients at risk for skin breakdown  - Assess and monitor skin integrity  - Assess and monitor nutrition and hydration status  - Monitor labs   - Assess for incontinence   - Turn and reposition patient  - Assist with mobility/ambulation  - Relieve pressure over bony prominences  - Avoid friction and shearing  - Provide appropriate hygiene as needed including keeping skin clean and dry  - Evaluate need for skin moisturizer/barrier cream  - Collaborate with interdisciplinary team   - Patient/family teaching  - Consider wound care consult   Outcome: Progressing     Problem: DISCHARGE PLANNING - CARE MANAGEMENT  Goal: Discharge to post-acute care or home with appropriate resources  Description: INTERVENTIONS:  - Conduct assessment to determine patient/family and health care team treatment goals, and need for post-acute services based on payer coverage, community resources, and patient preferences, and barriers to discharge  - Address psychosocial, clinical, and financial barriers to discharge as identified in assessment in conjunction with the patient/family and health care team  - Arrange appropriate level of post-acute services according to patient's   needs and preference and payer coverage in collaboration with the physician and health care team  - Communicate with and update the patient/family, physician, and health care team regarding progress on the discharge plan  - Arrange appropriate transportation to post-acute venues  Outcome: Progressing     Problem: INFECTION - ADULT  Goal: Absence or prevention of progression during hospitalization  Description: INTERVENTIONS:  - Assess and monitor for signs and symptoms of infection  - Monitor lab/diagnostic results  - Monitor all insertion sites, i.e. indwelling lines, tubes, and drains  - Monitor endotracheal if appropriate and nasal secretions for changes in amount and color  - [place] appropriate cooling/warming therapies per order  - Administer medications as ordered  - Instruct and encourage patient and family to use good hand hygiene technique  - Identify and instruct in appropriate isolation precautions for identified infection/condition  Outcome: Progressing  Goal: Absence of fever/infection during neutropenic period  Description: INTERVENTIONS:  - Monitor WBC    Outcome: Progressing     Problem: SAFETY ADULT  Goal: Patient will remain free of falls  Description: INTERVENTIONS:  - Assess patient frequently for physical needs  -  Identify cognitive and physical deficits and behaviors that affect risk of falls.   -  [place] fall precautions as indicated by assessment.  - Educate patient/family on patient safety including physical limitations  - Instruct patient to call for assistance with activity based on assessment  - Modify environment to reduce risk of injury  - Consider OT/PT consult to assist with strengthening/mobility  Outcome: Progressing  Goal: Maintain or return to baseline ADL function  Description: INTERVENTIONS:  -  Assess patient's ability to carry out ADLs; assess patient's baseline for ADL function and identify physical deficits which impact ability to perform ADLs (bathing, care of mouth/teeth, toileting, grooming, dressing, etc.)  - Assess/evaluate cause of self-care deficits   - Assess range of motion  - Assess patient's mobility; develop plan if impaired  - Assess patient's need for assistive devices and provide as appropriate  - Encourage maximum independence but intervene and supervise when necessary  - Involve family in performance of ADLs  - Assess for home care needs following discharge   - Consider OT consult to assist with ADL evaluation and planning for discharge  - Provide patient education as appropriate  Outcome: Progressing  Goal: Maintain or return mobility status to optimal level  Description: INTERVENTIONS:  - Assess patient's baseline mobility status (ambulation, transfers, stairs, etc.)    - Identify cognitive and physical deficits and behaviors that affect mobility  - Identify mobility aids required to assist with transfers and/or ambulation (gait belt, sit-to-stand, lift, walker, cane, etc.)  - [place] fall precautions as indicated by assessment  - Record patient progress and toleration of activity level on Mobility SBAR; progress patient to next Phase/Stage  - Instruct patient to call for assistance with activity based on assessment  - Consider rehabilitation consult to assist with strengthening/weightbearing, etc.  Outcome: Progressing     Problem: DISCHARGE PLANNING  Goal: Discharge to home or other facility with appropriate resources  Description: INTERVENTIONS:  - Identify barriers to discharge w/patient and caregiver  - Arrange for needed discharge resources and transportation as appropriate  - Identify discharge learning needs (meds, wound care, etc.)  - Arrange for interpretive services to assist at discharge as needed  - Refer to Case Management Department for coordinating discharge planning if the patient needs post-hospital services based on physician/advanced practitioner order or complex needs related to functional status, cognitive ability, or social support system  Outcome: Progressing     Problem: Knowledge Deficit  Goal: Patient/family/caregiver demonstrates understanding of disease process, treatment plan, medications, and discharge instructions  Description: Complete learning assessment and assess knowledge base. Interventions:  - Provide teaching at level of understanding  - Provide teaching via preferred learning methods  Outcome: Progressing     Problem: Nutrition/Hydration-ADULT  Goal: Nutrient/Hydration intake appropriate for improving, restoring or maintaining nutritional needs  Description: Monitor and assess patient's nutrition/hydration status for malnutrition. Collaborate with interdisciplinary team and initiate plan and interventions as ordered. Monitor patient's weight and dietary intake as ordered or per policy. Utilize nutrition screening tool and intervene as necessary. Determine patient's food preferences and provide high-protein, high-caloric foods as appropriate.      INTERVENTIONS:  - Monitor oral intake, urinary output, labs, and treatment plans  - Assess nutrition and hydration status and recommend course of action  - Evaluate amount of meals eaten  - Assist patient with eating if necessary   - Allow adequate time for meals  - Recommend/ encourage appropriate diets, oral nutritional supplements, and vitamin/mineral supplements  - Order, calculate, and assess calorie counts as needed  - Recommend, monitor, and adjust tube feedings and TPN/PPN based on assessed needs  - Assess need for intravenous fluids  - Provide specific nutrition/hydration education as appropriate  - Include patient/family/caregiver in decisions related to nutrition  Outcome: Progressing     Problem: GASTROINTESTINAL - ADULT  Goal: Maintains or returns to baseline bowel function  Description: INTERVENTIONS:  - Assess bowel function  - Encourage oral fluids to ensure adequate hydration  - Administer IV fluids if ordered to ensure adequate hydration  - Administer ordered medications as needed  - Encourage mobilization and activity  - Consider nutritional services referral to assist patient with adequate nutrition and appropriate food choices  Outcome: Progressing     Problem: SKIN/TISSUE INTEGRITY - ADULT  Goal: Skin integrity remains intact  Description: INTERVENTIONS  - Identify patients at risk for skin breakdown  - Assess and monitor skin integrity  - Assess and monitor nutrition and hydration status  - Monitor labs (i.e. albumin)  - Assess for incontinence   - Turn and reposition patient  - Assist with mobility/ambulation  - Relieve pressure over bony prominences  - Avoid friction and shearing  - Provide appropriate hygiene as needed including keeping skin clean and dry  - Evaluate need for skin moisturizer/barrier cream  - Collaborate with interdisciplinary team (i.e. Nutrition, Rehabilitation, etc.)   - Patient/family teaching  Outcome: Progressing     Problem: GENITOURINARY - ADULT  Goal: Urinary catheter remains patent  Description: INTERVENTIONS:  - Assess patency of condom catheter     Outcome: Progressing     Problem: CONFUSION/THOUGHT DISTURBANCE  Goal: Thought disturbances (confusion, delirium, depression, dementia or psychosis) are managed to maintain or return to baseline mental status and functional level  Description: INTERVENTIONS:  - Assess for possible contributors to  thought disturbance, including but not limited to medications, infection, impaired vision or hearing, underlying metabolic abnormalities, dehydration, respiratory compromise,  psychiatric diagnoses and notify attending PHYSICAN/AP  - Monitor and intervene to maintain adequate nutrition, hydration, elimination, sleep and activity  - Decrease environmental stimuli, including noise as appropriate. - Provide frequent contacts to provide refocusing, direction and reassurance as needed. Approach patient calmly with eye contact and at their level.   - [place] high risk fall precautions, aspiration precautions and other safety measures, as indicated  - If delirium suspected, notify physician/AP of change in condition and request immediate in-person evaluation  - Pursue consults as appropriate including Geriatric (campus dependent), OT for cognitive evaluation/activity planning, psychiatric, pastoral care, etc.  Outcome: Progressing

## 2020-10-23 NOTE — ASSESSMENT & PLAN NOTE
Lab Results   Component Value Date    EGFR 47 10/21/2020    EGFR 62 10/20/2020    EGFR 49 10/19/2020    CREATININE 1.48 (H) 10/21/2020    CREATININE 1.18 10/20/2020    CREATININE 1.43 (H) 10/19/2020     DREW resolved   Renal function baseline appears to be around 1.4   At baseline   outpatient follow up with Nephrology

## 2020-10-23 NOTE — ASSESSMENT & PLAN NOTE
Lab Results   Component Value Date    HGBA1C 7.7 (H) 10/19/2020     Results from last 7 days   Lab Units 10/21/20  0428   GLUCOSE RANDOM mg/dL 99     · Diabetes mellitus with hyperglycemia.  Resume metformin upon discharge    ·

## 2020-10-23 NOTE — SEDATION DOCUMENTATION
Attempted to place a Right picc on pt. Pt became upset, would not cooperate and could not hold still for procedure. Tried emotional support and calming efforts. Additional attempt made and we were unable to safely place a PICC line. MD aware and also attempted but was unable to do anything further than cleaning and prepping the site. MD to speak with attending to discuss plan. Pt transported back to room in stable condition. Pt RN aware.

## 2020-10-23 NOTE — DISCHARGE SUMMARY
Discharge- Natalya Nguyen Jennsunilson 1950, 69 y.o. male MRN: [id number]  Unit/Bed#: E2 -01 Encounter: [id number]  Primary Care Provider: Juli Haley MD   Date and time admitted to hospital: 10/15/2020  8:46 PM    * Sepsis [facility])  Assessment & Plan  · Sepsis POA thought to be secondary to pneumonia originally however original blood cultures on 10/15 growing MRSA and urine culture growing the same   · Infectious Disease consulted   · Switched to IV vancomycin given above   · repeat blood cultures obtained on 10/18 negative x 4 days    · S/p PICC placement on 10/22 for long term 4 weeks antibiotic course, PICC to be removed after last dose of IV antibiotics   · Weekly CBC with diff, BMP and vanc trough   · Infectious Disease follow up outpatient         MRSA bacteremia  Assessment & Plan  1 out of 2 blood cultures positive on admission growing MRSA   Urine culture growing the same   Repeat blood cultures negative x 4 days   S/p PICC placement on 10/22  for 4 weeks of IV antibiotics with vancomycine through 11/14   Echo: no vegetation/endocarditis   outpatient ID follow up   See above       UTI (urinary tract infection)  Assessment & Plan  MRSA UTI   Continue IV vancomycin    See plan above     Hyperlipidemia  Assessment & Plan  · Hyperlipidemia.     · Continue statin     Hypertension  Assessment & Plan  · Lisinopril held on admission given DREW  · Nephrology recommending NOT restarting ACE-I on discharge   · Started on amlodipine, increased to 5 mg once daily with improvement in BP   · outpatient follow up with PCP     Parkinson disease ([address])  Assessment & Plan  · Parkinson's dementia not much verbal at baseline and essentially bed/wheelchair bound  · Continue sinemet 25/100 t.i.d.  · speech eval - level 2 with thin liquids   · Spoke with patient's daughter-in-law at length using blue phone, patient lives with her and son and is non-verbal at baseline,  He is non ambulatory and they carry him to a recliner, she states he is at his baseline and  Has been this way since 2009, she states he always eats slowly takes about an hour to eat    Diabetes mellitus [facility])  Assessment & Plan  Lab Results   Component Value Date    HGBA1C 7.7 (H) 10/19/2020     Results from last 7 days   Lab Units 10/21/20  0428   GLUCOSE RANDOM mg/dL 99     · Diabetes mellitus with hyperglycemia. Resume metformin upon discharge    ·     GERD (gastroesophageal reflux disease)  Assessment & Plan  · GERD continue PPI    Anemia  Assessment & Plan  In setting of CKD   Will need workup as outpatient with PCP   No evidence of active bleeding     Lab Results   Component Value Date    HGB 9.6 (L) 10/20/2020    HGB 9.2 (L) 10/19/2020    HGB 8.7 (L) 10/18/2020    HGB 8.2 (L) 10/17/2020    HGB 9.0 (L) 10/16/2020         CKD (chronic kidney disease) stage 3, GFR 30-59 ml/min  Assessment & Plan  Lab Results   Component Value Date    EGFR 47 10/21/2020    EGFR 62 10/20/2020    EGFR 49 10/19/2020    CREATININE 1.48 (H) 10/21/2020    CREATININE 1.18 10/20/2020    CREATININE 1.43 (H) 10/19/2020     DREW resolved   Renal function baseline appears to be around 1.4   At baseline   outpatient follow up with Nephrology         I reviewed plan of care with patient's daughter in law at length at the bedside prior to discharge with use of blue phone interpretation services. She was very excited to bring patient home and thankful for our care. I reviewed hospital course and discharge plan as well as medications. I answered all her questions. They will return around 7 pm to transport patient home after his dose of IV vancomycin this evening.        Discharging Physician / Practitioner: Nisha Amado PA-C  PCP: Marta Gamino MD  Admission Date:   Admission Orders (From admission, onward)     Ordered        10/15/20 2325  Inpatient Admission  Once                   Discharge Date: 10/23/20    Resolved Problems  Date Reviewed: 10/23/2020          Resolved    Constipation 10/19/2020 Resolved by  Starla White PA-C    DREW (acute kidney injury) ([address]) 10/19/2020     Resolved by  Starla White PA-C    Hypernatremia 10/19/2020     Resolved by  Starla White PA-C          Consultations During Hospital Stay:  · Infectious Disease   · Nephrology     Procedures Performed:   · Interventional Radiology for PICC line placement on 10/22/2020 with double-lumen; Successful placement of a peripheral 5 [place] central venous catheter with its tip placed in the superior vena cava under fluoroscopic guidance. · Echocardiogram:  SUMMARY:  1. Technically difficult study  2. Left ventricle is normal in size with mildly reduced systolic function. Left ventricular ejection fraction is estimated at 50%  3. Grade 1 diastolic dysfunction  4. Basal anterolateral and inferolateral hypokinesis  5. Mild mitral regurgitation  6. Trace tricuspid regurgitation  7. Pulmonary artery systolic pressures cannot be estimated due to suboptimal tricuspid regurgitation envelope  8. There are no obvious echocardiographic indications of vegetation/endocarditis  9. There is no study for comparison    Significant Findings / Test Results:   · Chest x-ray:  No acute cardiopulmonary disease  · CT abdomen and pelvis:  FINDINGS:     ABDOMEN     LOWER CHEST:  Clear lung bases.     LIVER/BILIARY TREE:  No visualized hepatic lesion on noncontrast images. No biliary obstruction.     GALLBLADDER:  No calcified gallstones. No pericholecystic inflammatory change.     SPLEEN:  Unremarkable.     PANCREAS:  Unremarkable.     ADRENAL GLANDS:  Unremarkable.     KIDNEYS/URETERS:  Extensive renal vascular calcifications. No evidence of nephrolithiasis or obstructive uropathy.     STOMACH AND BOWEL:  Stool markedly distending the rectum. Large amount of stool in the right colon and sigmoid colon. No evidence of bowel obstruction, colitis or diverticulitis.     APPENDIX:  Normal appendix.     ABDOMINOPELVIC CAVITY:  No ascites.   No pneumoperitoneum. No lymphadenopathy.     VESSELS:  Diffuse calcified plaque throughout the aortoiliac arteries.     PELVIS     REPRODUCTIVE ORGANS:  No prostate enlargement.     URINARY BLADDER:  Unremarkable.     ABDOMINAL WALL/INGUINAL REGIONS:  Unremarkable.     OSSEOUS STRUCTURES:  No acute fracture or destructive osseous lesion.     IMPRESSION:     Findings consistent with rectal impaction and constipation. No evidence of bowel obstruction, colitis or diverticulitis. Incidental Findings:   · none     Test Results Pending at Discharge (will require follow up): · Weekly CBC with diff, BMP and vancomycin trough while on IV antibiotics      Outpatient Tests Requested:  · Infectious Disease   · Nephrology   · PCP follow up within 1 week for post hospital follow up     Complications:  MRSA bacteremia, UTI, DREW- resolved, hypertension     Reason for Admission: sepsis     Hospital Course:     Francois Velasquez is a 69 y.o. male patient who originally presented to the hospital on 10/15/2020 due to worsening debility and hypoxia. Patient met sepsis criteria on admission originally thought to be secondary to pneumonia. However, his blood cultures on admission 1/2 sets from going MRSA with urine culture growing the same. Consider hematogenous spread. His IV antibiotics started on admission were switched to IV vancomycin due to MRSA bacteremia secondary to MRSA UTI. He was seen in consultation with Infectious Disease. Patient had an echocardiogram which revealed no obvious indications or evidence of vegetation/endocarditis. Patient will continue IV vancomycin for 4 weeks total through 11/14/2020. Patient had PICC line inserted on 10/22/2020 for long-term IV antibiotics upon discharge. His PICC line will need to be removed after last dose of IV antibiotics. He will need continue weekly CBC with differential, BMP and vancomycin trough while on IV antibiotics.   He will need close follow up Infectious Disease outpatient. She will need surveillance blood cultures 2 weeks after completion of antibiotics. Patient will be discharged home with VNA services per family request and for ongoing management of long-term IV antibiotics. Patient was seen in consultation with Nephrology for acute kidney injury present on admission likely etiology was previously normal azotemia with a component of ATN well on ACE-inhibitor. Patient's lisinopril was discontinued indefinitely at this time. He was started on amlodipine 5 mg once daily for better blood pressure control. His renal function baseline appears to be around 1.4 and was stable prior to discharge. He will need ongoing follow-up with Nephrology outpatient. Patient CT scan abdomen and pelvis on admission revealing rectal impaction constipation. Patient has had multiple bowel movements while hospitalized. He should continue bowel regimen at home to prevent constipation in the future. He had a bowel movement today prior to discharge. Patient was seen by speech therapy. He should continue level 2 mechanical soft diet with thin liquids upon discharge. Of note, patient has Parkinson's dementia and her family who he lives with his nonverbal at baseline. Patient is nonambulatory at baseline and may carry him to a recliner at home. Family states he is at baseline prior to discharge. They state he has been this way since 2009. Please see above list of diagnoses and related plan for additional information. Condition at Discharge: stable     Discharge Day Visit / Exam:     Subjective:  Limited ROS due to dementia, primary nonverbal. Says "mariano". In no distress.      Vitals: Blood Pressure: 151/67 (10/23/20 0700)  Pulse: 65 (10/23/20 0700)  Temperature: (!) 96.6 °F (35.9 °C) (10/23/20 0700)  Temp Source: Temporal (10/23/20 0700)  Respirations: 17 (10/23/20 0700)  Weight - Scale: 61.8 kg (136 lb 3.9 oz) (10/23/20 0533)  SpO2: 99 % (10/23/20 0700)  Exam:   Physical Exam  Vitals signs and nursing note reviewed. Constitutional:       General: He is not in acute distress. Comments: Frail    HENT:      Head: Normocephalic. Cardiovascular:      Rate and Rhythm: Normal rate and regular rhythm. Pulmonary:      Effort: Pulmonary effort is normal.      Breath sounds: Normal breath sounds. Comments: Decreased breath sounds throughout   Abdominal:      General: Bowel sounds are normal.      Palpations: Abdomen is soft. Musculoskeletal:      Right lower leg: No edema. Left lower leg: No edema. Skin:     General: Skin is warm. Neurological:      Mental Status: He is alert. Comments: Oriented x 0, says "mariano" primary nonverbal    Psychiatric:         Behavior: Behavior is not agitated or combative. Cognition and Memory: Cognition is impaired. Discharge instructions/Information to patient and family:   See after visit summary for information provided to patient and family. Provisions for Follow-Up Care:  See after visit summary for information related to follow-up care and any pertinent home health orders. Disposition:     Home with VNA Services (Reminder: Complete face to face encounter)    For Discharges to [facility] SNF:   · Not Applicable to this Patient - Not Applicable to this Patient    Planned Readmission: none, high risk for readmission      Discharge Statement:  I spent 45 minutes discharging the patient. This time was spent on the day of discharge. I had direct contact with the patient on the day of discharge. Greater than 50% of the total time was spent examining patient, answering all patient questions, arranging and discussing plan of care with patient as well as directly providing post-discharge instructions. Additional time then spent on discharge activities. Discharge Medications:  See after visit summary for reconciled discharge medications provided to patient and family. ** Please Note: This note has been constructed using a voice recognition system **

## 2020-10-23 NOTE — CASE MANAGEMENT
IV abx through [facility] covered at 100%. [facility] reports that they can see pt tomorrow at [facility] for his first dose. Pt to get his 2 doses here today in the hospital and can discharge home with family after. Family will provide transportation home. CM will advise when abx will be delivered.

## 2020-10-23 NOTE — ASSESSMENT & PLAN NOTE
· Sepsis POA thought to be secondary to pneumonia originally however original blood cultures on 10/15 growing MRSA and urine culture growing the same   · Infectious Disease consulted   · Switched to IV vancomycin given above   · repeat blood cultures obtained on 10/18 negative x 4 days    · S/p PICC placement on 10/22 for long term 4 weeks antibiotic course, PICC to be removed after last dose of IV antibiotics   · Weekly CBC with diff, BMP and vanc trough   · Infectious Disease follow up outpatient

## 2020-10-23 NOTE — PLAN OF CARE
Problem: Nutrition/Hydration-ADULT  Goal: Nutrient/Hydration intake appropriate for improving, restoring or maintaining nutritional needs  Description: Monitor and assess patient's nutrition/hydration status for malnutrition. Collaborate with interdisciplinary team and initiate plan and interventions as ordered. Monitor patient's weight and dietary intake as ordered or per policy. Utilize nutrition screening tool and intervene as necessary. Determine patient's food preferences and provide high-protein, high-caloric foods as appropriate.      INTERVENTIONS:  - Monitor oral intake, urinary output, labs, and treatment plans  - Assess nutrition and hydration status and recommend course of action  - Evaluate amount of meals eaten  - Assist patient with eating if necessary   - Allow adequate time for meals  - Recommend/ encourage appropriate diets, oral nutritional supplements, and vitamin/mineral supplements  - Order, calculate, and assess calorie counts as needed  - Recommend, monitor, and adjust tube feedings and TPN/PPN based on assessed needs  - Assess need for intravenous fluids  - Provide specific nutrition/hydration education as appropriate  - Include patient/family/caregiver in decisions related to nutrition  10/23/2020 1615 by Miles Vazquez RD  Outcome: Progressing  10/23/2020 1615 by Miles Vazquez RD  Outcome: Progressing

## 2020-10-23 NOTE — WOUND OSTOMY CARE
Progress Note - Wound   Marc Barfield Dejesusmorel 69 y.o. male MRN: [id number]  Unit/Bed#: E2 -01 Encounter: [id number]        Assessment:   Patient seen for wound care follow-up. Explained plan of assessment to patient using [employer] phone system. Patient assessed in bed. Requires assist x 2 to turn/reposition. He is incontinent of bowel and bladder--condom catheter in use for urinary management. Lower extremities are contracted. Buttocks and sacrum are intact with blanchable erythema to sacrum. Heels are intact without erythema. Intact hypo/hyperpigmented scar tissue to right hip. Scattered hyperpigmented scars to sacrum and lower back. 1.  Present on admission unstageable pressure injury to left hip--yellow slough has resolved revealing pink partial thickness areas. One small area of tan slough remains to center, edges lifting revealing beefy red tissue. No induration or fluctuance. 2.  Present on admission small area of dry, yellow eschar to sacrum--RESOLVED.     See flowsheet and media for wound details. Patient on P500 low air-loss mattress with positioning wedges in use. Patient picking/pulling at things during assessment--primary RN made aware that patient had started to remove his PICC dressing. This nurse reinforced with tape before leaving the room. Wound Care Plan:   1-Apply Allevyn Life foam dressing to midline bilateral heels, rigtht hip scar tissue, and between knees for prevention. Moy with P.  Peel back at least daily for skin assessment and re-apply. Change dressing every 3 days and PRN. 2-Elevate heels off of bed surface with wedges, pillows or prevalon boots to offload pressure. 3-Hydraguard lotion to bilateral buttocks and sacrum three times daily and PRN with incontinence care. 4-Moisturize skin daily with skin nourishing cream.  5-Turn/reposition q2h or when medically stable for pressure re-distribution on skin--use positioning wedges. 6-P500 low air-loss mattress. 7-Left hip--cleanse with soap and water, pat dry. Apply Allevyn Life foam dressing. Moy with T.  Change dressing every 3 days and PRN.     Wound care team to follow. Patient to be discharged home with VNA. Wound 10/16/20 Pressure Injury Hip Left;Lateral (Active)   Wound Image   10/23/20 1314   Wound Description Pink;Tan;Beefy red 10/23/20 1314   Pressure Injury Stage U 10/23/20 1314   Peace-wound Assessment Hyperpigmented; Intact 10/23/20 1314   Wound Length (cm) 3.6 cm 10/23/20 1314   Wound Width (cm) 3 cm 10/23/20 1314   Wound Depth (cm) 0.1 cm 10/23/20 1314   Wound Surface Area (cm^2) 10.8 cm^2 10/23/20 1314   Wound Volume (cm^3) 1.08 cm^3 10/23/20 1314   Calculated Wound Volume (cm^3) 1.08 cm^3 10/23/20 1314   Change in Wound Size % 50 10/23/20 1314   Drainage Amount Scant 10/23/20 1314   Drainage Description Serosanguineous 10/23/20 1314   Treatments Cleansed 10/23/20 1314   Dressing Foam, Silicon (eg. Allevyn, etc) 10/23/20 1314   Dressing Changed Changed 10/23/20 1314   Patient Tolerance Tolerated well 10/23/20 1314   Dressing Status Clean;Dry; Intact 10/23/20 Mk GREENN, RN, [employer]

## 2020-10-23 NOTE — CASE MANAGEMENT
Abx have been delivered to pt's room. Rn placed abx in fridge until pt leaves. CM asked my [facility] to reiterate to family to place abx in the fridge and then to take it out of the fridge a few hours before use. CM asked Rn to explain this to pt's family when going over dc instructions.

## 2020-10-23 NOTE — CONSULTS
IR note:    Patient unfortunately pulled out PICC    Requires IV antibiotics for MRSA bacteremia    We will replace    If he removed this again I would suggest not placing another line    Needs aggressive measures to prevent this from happening again

## 2020-10-23 NOTE — DISCHARGE INSTRUCTIONS
You will need weekly lab work while on IV antibiotics. You will need close follow up with Infectious Disease outpatient. Peripherally Inserted Central Catheter     WHAT YOU NEED TO KNOW:   A PICC is an IV placed into a large blood vessel near your heart. It is usually inserted through a blood vessel in your arm. Your PICC may have multiple ports. Ports are tubes where you can inject medicine. A PICC can stay in place for several weeks or months. You may need a PICC to get nutrition, medicine, or fluids. Blood samples can be removed from your PICC and sent to the lab for tests. DISCHARGE INSTRUCTIONS:    [facility] patients,    Contact Interventional Radiology at [phone] PATIENTS: Contact Interventional Radiology at [phone]   [facility] PATIENTS: Contact Interventional Radiology at [phone] if:  · Blood soaks through your bandage. · Your arm or leg feels warm, tender, and painful. It may look swollen and red. · You have trouble moving your arm. · Your catheter falls out. · You have a fever or swelling, redness, pain, or pus where the catheter was inserted. · Persistent nausea or vomiting. · You cannot flush your catheter, or you feel pain when you flush your catheter. · You see a hole or crack in the tubing of your catheter. · You see fluid leaking from the insertion site. · You run out of supplies to care for your catheter. · You have questions or concerns about your condition or care. REMOVE PICC AFTER FINAL DOSE OF IV ANTIBIOTICS ON 11/14/2020. Weekly CBCD, creatinine, and vancomycin trough while on IV antibiotics. You will be set up with an appointment in the outpatient Infectious Disease office. It is important for you to keep this appointment in order for us to monitor you while you are on IV antibiotics.   This will include evaluating your lab work, examining your PICC line, and making sure you are not having toxicities or side effects of the medication(s) you are receiving.   ------------------------------------------------------------------------------------------------------------  Wound Care Plan:   1-Apply Allevyn Life foam dressing to midline bilateral heels, rigtht hip scar tissue, and between knees for prevention.  Moy with P.  Peel back at least daily for skin assessment and re-apply.  Change dressing every 3 days and PRN. 2-Elevate heels off of bed surface with wedges, pillows or prevalon boots to offload pressure. 3-Hydraguard lotion to bilateral buttocks and sacrum three times daily and PRN with incontinence care. 4-Moisturize skin daily with skin nourishing cream.   5-Left hip--cleanse with soap and water, pat dry.  Apply Allevyn Life foam dressing.  Moy with T.  Change dressing every 3 days and PRN.

## 2020-10-23 NOTE — PROGRESS NOTES
NEPHROLOGY PROGRESS NOTE   Adilene Foster 69 y.o. male MRN: [id number]  Unit/Bed#: E2 -01 Encounter: [id number]      ASSESSMENT & PLAN    1. Acute Kidney Injury was present on admission and this has resolved  ? Etiology was likely pre renal azotemia with a component of ATN while Ace inhibition  ? ACE-inhibitor on hold and received IV fluids and creatinine improved now to baseline  ? Will monitor off IV fluids  ? Monitor antibiotic levels-pharmacy  ? Medications otherwise appropriately dosed  ? Urinalysis shows a specific gravity 1.025, 2+ proteinuria this should be worked up as an outpatient  ? CT of the abdomen and pelvis on October 15th does show extensive renal vascular calcification but no evidence of nephrolithiasis or obstructive uropathy  ?  creatinine stable   2. Electrolytes:  ? Electrolytes currently acceptable  3. Acid/Base:  ? Now anion gap and bicarb stable  4. BP/HR:  ? Hypertension-  blood pressure is slightly elevated today isolated but if remains elevated can up titrate amlodipine dose  ? Holding ACE-inhibitor and changed amlodipine 5 mg daily he can be discharged on this  5. Volume Status  ? Appears euvolemic  6. Anemia  ? Normocytic anemia in the setting of chronic kidney disease  ? Should have workup done  ? Will hold on checking iron stores in the setting of being treated for infection  7. MBD  ? Check phosphorus and PTH as an outpatient  8. Health Maintanance/Risk Reduction  ?  Sepsis secondary to pneumonia UTI-has a staph aureus bacteremia-being treated with vancomycin will need long-term antibiotic  ?  mental status at baseline  ?  he does have stage 3 chronic kidney disease, will have office call his family for follow-up regarding blood pressure and CKD     SUBJECTIVE:     no acute  Complaints   no chest pain or shortness of breath   no fevers or chills    OBJECTIVE:  Current Weight: Weight - Scale: 61.8 kg (136 lb 3.9 oz)  Vitals:    10/23/20 0700   BP: 151/67   Pulse: 65   Resp: 17   Temp: (!) 96.6 °F (35.9 °C)   SpO2: 99%       Intake/Output Summary (Last 24 hours) at 10/23/2020 1135  Last data filed at 10/23/2020 1001  Gross per 24 hour   Intake 400 ml   Output --   Net 400 ml     Weight (last 2 days)     Date/Time   Weight    10/23/20 0533   61.8 (136.24)    10/22/20 0543   62.4 (137.57)    10/21/20 0542   62.7 (138.23)              General: conscious, cooperative, in not acute distress  Eyes: conjunctivae pink, anicteric sclerae  ENT: lips and mucous membranes moist  Neck: supple, no JVD  Chest: clear breath sounds bilateral, no crackles, ronchus or wheezings  CVS: distinct S1 & S2, normal rate, regular rhythm  Abdomen: soft, non-tender, non-distended, normoactive bowel sounds  Extremities: no edema of both legs  Skin: no rash  Neuro: awake, alert, oriented      Medications:    Current Facility-Administered Medications:   •  acetaminophen (TYLENOL) tablet 650 mg, 650 mg, Oral, Q6H PRN, Sisi Granados DO  •  amLODIPine (NORVASC) tablet 5 mg, 5 mg, Oral, Daily, Kelvin Vazquez DO, 5 mg at 10/23/20 0900  •  atorvastatin (LIPITOR) tablet 40 mg, 40 mg, Oral, Daily With Dinner, Young Medel DO, 40 mg at 10/22/20 1719  •  carbidopa-levodopa (SINEMET)  mg per tablet 1 tablet, 1 tablet, Oral, TID, Young Medel DO, 1 tablet at 10/23/20 0900  •  heparin (porcine) subcutaneous injection 5,000 Units, 5,000 Units, Subcutaneous, Q8H [facility] & intermediate, Young Medel DO, 5,000 Units at 10/23/20 0545  •  hydrALAZINE (APRESOLINE) injection 5 mg, 5 mg, Intravenous, Q6H PRN, Kobi Alexander, DO  •  influenza vaccine, high-dose (FLUZONE HIGH-DOSE) IM injection JOSE 0.7 mL, 0.7 mL, Intramuscular, Once, Lee Valle, DO  •  insulin glargine (LANTUS) subcutaneous injection 10 Units 0.1 mL, 10 Units, Subcutaneous, HS, Sharyle Ree, , 10 Units at 10/22/20 2228  •  insulin lispro (HumaLOG) 100 units/mL subcutaneous injection 1-6 Units, 1-6 Units, Subcutaneous, 4x Daily (AC & HS), 3 Units at 10/22/20 2229 **AND** Fingerstick Glucose (POCT), , , 4x Daily AC and at bedtime, Macel Backers, DO  •  levalbuterol (XOPENEX) inhalation solution 1.25 mg, 1.25 mg, Nebulization, Q6H PRN, Toñito Medel, DO  •  ondansetron (ZOFRAN) injection 4 mg, 4 mg, Intravenous, Q6H PRN, Macel Backers, DO  •  pantoprazole (PROTONIX) injection 40 mg, 40 mg, Intravenous, Q24H [address], Breanne Smith PA-C, 40 mg at 10/23/20 0259  •  senna-docusate sodium (SENOKOT S) 8.6-50 mg per tablet 1 tablet, 1 tablet, Oral, BID, Brijesh Levon, DO, 1 tablet at 10/23/20 0900  •  traZODone (DESYREL) tablet 150 mg, 150 mg, Oral, HS, Young Medel, DO, 150 mg at 10/22/20 2216  •  vancomycin (VANCOCIN) IVPB (premix in dextrose) 750 mg 150 mL, 750 mg, Intravenous, Q12H, Mariela Oh MD, Last Rate: 150 mL/hr at 10/23/20 0529, 750 mg at 10/23/20 0529    Invasive Devices:      Lab Results:   Results from last 7 days   Lab Units 10/21/20  0428 10/20/20  0516 10/19/20  0443 10/18/20  1627 10/18/20  1626 10/18/20  0907 10/17/20  0728   WBC Thousand/uL  --  8.31 10.61*  --   --  11.15* 12.76*   HEMOGLOBIN g/dL  --  9.6* 9.2*  --   --  8.7* 8.2*   HEMATOCRIT %  --  31.2* 30.9*  --   --  30.0* 27.7*   PLATELETS Thousands/uL  --  285 234  --   --  211 242   POTASSIUM mmol/L 4.7 5.2 4.1  --   --  3.8 4.1   CHLORIDE mmol/L 104 103 104  --   --  105 112*   CO2 mmol/L 24 25 24  --   --  24 26   BUN mg/dL 27* 18 19  --   --  25 42*   CREATININE mg/dL 1.48* 1.18 1.43*  --   --  1.41* 1.61*   CALCIUM mg/dL 9.1 9.1 9.1  --   --  8.5 9.0   MAGNESIUM mg/dL  --   --   --   --   --   --  1.7   ALK PHOS U/L  --   --  102  --   --  93  --    ALT U/L  --   --  59  --   --  87*  --    AST U/L  --   --  76*  --   --  87*  --    BLOOD CULTURE   --   --   --  No Growth After 4 Days. No Growth After 4 Days.   --   --        Previous work up:  Please see previous notes

## 2020-10-23 NOTE — ASSESSMENT & PLAN NOTE
1 out of 2 blood cultures positive on admission growing MRSA   Urine culture growing the same   Repeat blood cultures negative x 4 days   S/p PICC placement on 10/22  for 4 weeks of IV antibiotics with vancomycine through 11/14   Echo: no vegetation/endocarditis   outpatient ID follow up   See above

## 2020-10-23 NOTE — QUICK NOTE
Notified by Nursing that patient pulled out his PICC. Unfortunately PICC nurse left for the day, I spoke with IR, will attempt to replace PICC today. If PICC placed successfully without complication can go home today. Due to severe dementia, patient will need close monitoring upon discharge to make sure he does not remove his PICC again therefore he would have to return to the hospital to replace and will need alternative plan.

## 2020-10-24 LAB
ANION GAP SERPL CALCULATED.3IONS-SCNC: 11 MMOL/L (ref 4–13)
ANION GAP SERPL CALCULATED.3IONS-SCNC: 13 MMOL/L (ref 4–13)
BUN SERPL-MCNC: 17 MG/DL (ref 5–25)
BUN SERPL-MCNC: 18 MG/DL (ref 5–25)
CALCIUM SERPL-MCNC: 8.8 MG/DL (ref 8.3–10.1)
CALCIUM SERPL-MCNC: 8.9 MG/DL (ref 8.3–10.1)
CHLORIDE SERPL-SCNC: 100 MMOL/L (ref 100–108)
CHLORIDE SERPL-SCNC: 100 MMOL/L (ref 100–108)
CO2 SERPL-SCNC: 21 MMOL/L (ref 21–32)
CO2 SERPL-SCNC: 22 MMOL/L (ref 21–32)
CREAT SERPL-MCNC: 1.17 MG/DL (ref 0.6–1.3)
CREAT SERPL-MCNC: 1.21 MG/DL (ref 0.6–1.3)
GFR SERPL CREATININE-BSD FRML MDRD: 60 ML/MIN/1.73SQ M
GFR SERPL CREATININE-BSD FRML MDRD: 62 ML/MIN/1.73SQ M
GLUCOSE SERPL-MCNC: 115 MG/DL (ref 65–140)
GLUCOSE SERPL-MCNC: 120 MG/DL (ref 65–140)
GLUCOSE SERPL-MCNC: 128 MG/DL (ref 65–140)
GLUCOSE SERPL-MCNC: 178 MG/DL (ref 65–140)
GLUCOSE SERPL-MCNC: 207 MG/DL (ref 65–140)
GLUCOSE SERPL-MCNC: 243 MG/DL (ref 65–140)
GLUCOSE SERPL-MCNC: 247 MG/DL (ref 65–140)
POTASSIUM SERPL-SCNC: 4.2 MMOL/L (ref 3.5–5.3)
POTASSIUM SERPL-SCNC: 4.2 MMOL/L (ref 3.5–5.3)
SODIUM SERPL-SCNC: 133 MMOL/L (ref 136–145)
SODIUM SERPL-SCNC: 134 MMOL/L (ref 136–145)
VANCOMYCIN TROUGH SERPL-MCNC: 24.2 UG/ML (ref 10–20)

## 2020-10-24 PROCEDURE — C9113 INJ PANTOPRAZOLE SODIUM, VIA: HCPCS | Performed by: PHYSICIAN ASSISTANT

## 2020-10-24 PROCEDURE — 80202 ASSAY OF VANCOMYCIN: CPT | Performed by: INTERNAL MEDICINE

## 2020-10-24 PROCEDURE — 99232 SBSQ HOSP IP/OBS MODERATE 35: CPT | Performed by: PHYSICIAN ASSISTANT

## 2020-10-24 PROCEDURE — 99232 SBSQ HOSP IP/OBS MODERATE 35: CPT | Performed by: INTERNAL MEDICINE

## 2020-10-24 PROCEDURE — 80048 BASIC METABOLIC PNL TOTAL CA: CPT | Performed by: INTERNAL MEDICINE

## 2020-10-24 PROCEDURE — 82948 REAGENT STRIP/BLOOD GLUCOSE: CPT

## 2020-10-24 RX ORDER — OLANZAPINE 10 MG/1
2.5 INJECTION, POWDER, LYOPHILIZED, FOR SOLUTION INTRAMUSCULAR ONCE
Status: COMPLETED | OUTPATIENT
Start: 2020-10-24 | End: 2020-10-24

## 2020-10-24 RX ADMIN — VANCOMYCIN HYDROCHLORIDE 750 MG: 750 INJECTION, SOLUTION INTRAVENOUS at 21:19

## 2020-10-24 RX ADMIN — HEPARIN SODIUM 5000 UNITS: 5000 INJECTION INTRAVENOUS; SUBCUTANEOUS at 14:58

## 2020-10-24 RX ADMIN — CARBIDOPA AND LEVODOPA 1 TABLET: 25; 100 TABLET ORAL at 08:22

## 2020-10-24 RX ADMIN — HEPARIN SODIUM 5000 UNITS: 5000 INJECTION INTRAVENOUS; SUBCUTANEOUS at 21:27

## 2020-10-24 RX ADMIN — DOCUSATE SODIUM AND SENNOSIDES 1 TABLET: 8.6; 5 TABLET, FILM COATED ORAL at 08:21

## 2020-10-24 RX ADMIN — ATORVASTATIN CALCIUM 40 MG: 40 TABLET, FILM COATED ORAL at 18:14

## 2020-10-24 RX ADMIN — OLANZAPINE 2.5 MG: 10 INJECTION, POWDER, FOR SOLUTION INTRAMUSCULAR at 00:21

## 2020-10-24 RX ADMIN — HEPARIN SODIUM 5000 UNITS: 5000 INJECTION INTRAVENOUS; SUBCUTANEOUS at 06:38

## 2020-10-24 RX ADMIN — INSULIN LISPRO 1 UNITS: 100 INJECTION, SOLUTION INTRAVENOUS; SUBCUTANEOUS at 21:36

## 2020-10-24 RX ADMIN — PANTOPRAZOLE SODIUM 40 MG: 40 INJECTION, POWDER, FOR SOLUTION INTRAVENOUS at 06:38

## 2020-10-24 RX ADMIN — CARBIDOPA AND LEVODOPA 1 TABLET: 25; 100 TABLET ORAL at 18:14

## 2020-10-24 RX ADMIN — CARBIDOPA AND LEVODOPA 1 TABLET: 25; 100 TABLET ORAL at 21:27

## 2020-10-24 RX ADMIN — INSULIN GLARGINE 10 UNITS: 100 INJECTION, SOLUTION SUBCUTANEOUS at 21:36

## 2020-10-24 RX ADMIN — TRAZODONE HYDROCHLORIDE 150 MG: 100 TABLET ORAL at 21:27

## 2020-10-24 RX ADMIN — INSULIN LISPRO 2 UNITS: 100 INJECTION, SOLUTION INTRAVENOUS; SUBCUTANEOUS at 18:11

## 2020-10-24 RX ADMIN — AMLODIPINE BESYLATE 5 MG: 5 TABLET ORAL at 08:22

## 2020-10-24 RX ADMIN — VANCOMYCIN HYDROCHLORIDE 750 MG: 750 INJECTION, SOLUTION INTRAVENOUS at 08:22

## 2020-10-24 RX ADMIN — DOCUSATE SODIUM AND SENNOSIDES 1 TABLET: 8.6; 5 TABLET, FILM COATED ORAL at 18:14

## 2020-10-24 NOTE — PROGRESS NOTES
NEPHROLOGY PROGRESS NOTE   Milana Foster 69 y.o. male MRN: [id number]  Unit/Bed#: E2 -01 Encounter: [id number]  Reason for Consult: DREW/CKD    ASSESSMENT/PLAN:  1. Acute Kidney Injury- resolved  - secondary to prerenal azotemia in the setting of ACEI  - holding ACEI, can be restarted upon outpatient followup  2. Hyponatremia- corrects for glucose  3. Volume- euvolemic  4. Anemia- check iron stores once active infection over  5. BMD- outpatient workup  6. Chronic Kidney Disease stage 3- follow up with nephrology on discharge    Disposition:  Stable from a renal standpoint. Awaiting PICC line and discharge. SUBJECTIVE:  Patient not very cooperative for exam.  Denies SOB.     OBJECTIVE:  Current Weight: Weight - Scale: 62.2 kg (137 lb 2 oz)  Vitals:    10/23/20 0700 10/23/20 1616 10/23/20 2300 10/24/20 0532   BP: 151/67 142/83 139/78    BP Location:  Right arm     Pulse: 65 65 72    Resp: 17 16 20    Temp: (!) 96.6 °F (35.9 °C) 97.5 °F (36.4 °C) 98.2 °F (36.8 °C)    TempSrc: Temporal Temporal Temporal    SpO2: 99% 99% 97%    Weight:    62.2 kg (137 lb 2 oz)       Intake/Output Summary (Last 24 hours) at 10/24/2020 0856  Last data filed at 10/23/2020 1506  Gross per 24 hour   Intake 400 ml   Output 250 ml   Net 150 ml     General: NAD  Skin: no rash  Eyes: anicteric  ENMT: mm moist  Neck: no masses  Respiratory: CTAB  Cardiac: RRR  Extremities: no edema  GI: soft nt nd  Neuro: alert awake    Medications:    Current Facility-Administered Medications:   •  acetaminophen (TYLENOL) tablet 650 mg, 650 mg, Oral, Q6H PRN, Kayla Bauman DO  •  amLODIPine (NORVASC) tablet 5 mg, 5 mg, Oral, Daily, Kelvinpaul Vazquez DO, 5 mg at 10/24/20 3159  •  atorvastatin (LIPITOR) tablet 40 mg, 40 mg, Oral, Daily With Dinner, Young Medel DO, 40 mg at 10/23/20 1616  •  carbidopa-levodopa (SINEMET)  mg per tablet 1 tablet, 1 tablet, Oral, TID, Kayla Bauman DO, 1 tablet at 10/24/20 9519  •  heparin (porcine) subcutaneous injection 5,000 Units, 5,000 Units, Subcutaneous, Q8H [address], [facility], DO, 5,000 Units at 10/24/20 3313  •  hydrALAZINE (APRESOLINE) injection 5 mg, 5 mg, Intravenous, Q6H PRN, Mary Hernandez, DO  •  influenza vaccine, high-dose (FLUZONE HIGH-DOSE) IM injection JOSE 0.7 mL, 0.7 mL, Intramuscular, Once, Lee Valle, DO  •  insulin glargine (LANTUS) subcutaneous injection 10 Units 0.1 mL, 10 Units, Subcutaneous, HS, Leann Birch, DO, 10 Units at 10/23/20 2132  •  insulin lispro (HumaLOG) 100 units/mL subcutaneous injection 1-6 Units, 1-6 Units, Subcutaneous, 4x Daily (AC & HS), 1 Units at 10/23/20 2133 **AND** Fingerstick Glucose (POCT), , , 4x Daily AC and at bedtime, Viviana Wen DO  •  levalbuterol (XOPENEX) inhalation solution 1.25 mg, 1.25 mg, Nebulization, Q6H PRN, [facility] Nicki Medel, DO  •  ondansetron (ZOFRAN) injection 4 mg, 4 mg, Intravenous, Q6H PRN, [facility] Nicki Medel, DO  •  pantoprazole (PROTONIX) injection 40 mg, 40 mg, Intravenous, Q24H [address], Troy Birch PA-C, 40 mg at 10/24/20 5606  •  senna-docusate sodium (SENOKOT S) 8.6-50 mg per tablet 1 tablet, 1 tablet, Oral, BID, Leann Birch DO, 1 tablet at 10/24/20 7977  •  traZODone (DESYREL) tablet 150 mg, 150 mg, Oral, HS, Young Medel DO, 150 mg at 10/23/20 2132  •  vancomycin (VANCOCIN) IVPB (premix in dextrose) 750 mg 150 mL, 750 mg, Intravenous, Q12H, Verner Queen, MD, Last Rate: 150 mL/hr at 10/24/20 0822, 750 mg at 10/24/20 4066    Laboratory Results:  Results from last 7 days   Lab Units 10/24/20  0335 10/21/20  0428 10/20/20  0516 10/19/20  0443 10/18/20  0907   WBC Thousand/uL  --   --  8.31 10.61* 11.15*   HEMOGLOBIN g/dL  --   --  9.6* 9.2* 8.7*   HEMATOCRIT %  --   --  31.2* 30.9* 30.0*   PLATELETS Thousands/uL  --   --  285 234 211   POTASSIUM mmol/L 4.2 4.7 5.2 4.1 3.8   CHLORIDE mmol/L 100 104 103 104 105   CO2 mmol/L 22 24 25 24 24   BUN mg/dL 18 27* 18 19 25   CREATININE mg/dL 1.17 1.48* 1.18 1.43* 1.41*   CALCIUM mg/dL 8.8 9.1 9.1 9.1 8.5

## 2020-10-24 NOTE — PLAN OF CARE
Problem: Potential for Falls  Goal: Patient will remain free of falls  Description: INTERVENTIONS:  - Assess patient frequently for physical needs  -  Identify cognitive and physical deficits and behaviors that affect risk of falls.   -  [place] fall precautions as indicated by assessment.  - Educate patient/family on patient safety including physical limitations  - Instruct patient to call for assistance with activity based on assessment  - Modify environment to reduce risk of injury  - Consider OT/PT consult to assist with strengthening/mobility  Outcome: Progressing     Problem: Prexisting or High Potential for Compromised Skin Integrity  Goal: Skin integrity is maintained or improved  Description: INTERVENTIONS:  - Identify patients at risk for skin breakdown  - Assess and monitor skin integrity  - Assess and monitor nutrition and hydration status  - Monitor labs   - Assess for incontinence   - Turn and reposition patient  - Assist with mobility/ambulation  - Relieve pressure over bony prominences  - Avoid friction and shearing  - Provide appropriate hygiene as needed including keeping skin clean and dry  - Evaluate need for skin moisturizer/barrier cream  - Collaborate with interdisciplinary team   - Patient/family teaching  - Consider wound care consult   Outcome: Progressing     Problem: DISCHARGE PLANNING - CARE MANAGEMENT  Goal: Discharge to post-acute care or home with appropriate resources  Description: INTERVENTIONS:  - Conduct assessment to determine patient/family and health care team treatment goals, and need for post-acute services based on payer coverage, community resources, and patient preferences, and barriers to discharge  - Address psychosocial, clinical, and financial barriers to discharge as identified in assessment in conjunction with the patient/family and health care team  - Arrange appropriate level of post-acute services according to patient's   needs and preference and payer coverage in collaboration with the physician and health care team  - Communicate with and update the patient/family, physician, and health care team regarding progress on the discharge plan  - Arrange appropriate transportation to post-acute venues  Outcome: Progressing     Problem: INFECTION - ADULT  Goal: Absence or prevention of progression during hospitalization  Description: INTERVENTIONS:  - Assess and monitor for signs and symptoms of infection  - Monitor lab/diagnostic results  - Monitor all insertion sites, i.e. indwelling lines, tubes, and drains  - Monitor endotracheal if appropriate and nasal secretions for changes in amount and color  - [place] appropriate cooling/warming therapies per order  - Administer medications as ordered  - Instruct and encourage patient and family to use good hand hygiene technique  - Identify and instruct in appropriate isolation precautions for identified infection/condition  Outcome: Progressing  Goal: Absence of fever/infection during neutropenic period  Description: INTERVENTIONS:  - Monitor WBC    Outcome: Progressing     Problem: SAFETY ADULT  Goal: Patient will remain free of falls  Description: INTERVENTIONS:  - Assess patient frequently for physical needs  -  Identify cognitive and physical deficits and behaviors that affect risk of falls.   -  [place] fall precautions as indicated by assessment.  - Educate patient/family on patient safety including physical limitations  - Instruct patient to call for assistance with activity based on assessment  - Modify environment to reduce risk of injury  - Consider OT/PT consult to assist with strengthening/mobility  Outcome: Progressing  Goal: Maintain or return to baseline ADL function  Description: INTERVENTIONS:  -  Assess patient's ability to carry out ADLs; assess patient's baseline for ADL function and identify physical deficits which impact ability to perform ADLs (bathing, care of mouth/teeth, toileting, grooming, dressing, etc.)  - Assess/evaluate cause of self-care deficits   - Assess range of motion  - Assess patient's mobility; develop plan if impaired  - Assess patient's need for assistive devices and provide as appropriate  - Encourage maximum independence but intervene and supervise when necessary  - Involve family in performance of ADLs  - Assess for home care needs following discharge   - Consider OT consult to assist with ADL evaluation and planning for discharge  - Provide patient education as appropriate  Outcome: Progressing  Goal: Maintain or return mobility status to optimal level  Description: INTERVENTIONS:  - Assess patient's baseline mobility status (ambulation, transfers, stairs, etc.)    - Identify cognitive and physical deficits and behaviors that affect mobility  - Identify mobility aids required to assist with transfers and/or ambulation (gait belt, sit-to-stand, lift, walker, cane, etc.)  - [place] fall precautions as indicated by assessment  - Record patient progress and toleration of activity level on Mobility SBAR; progress patient to next Phase/Stage  - Instruct patient to call for assistance with activity based on assessment  - Consider rehabilitation consult to assist with strengthening/weightbearing, etc.  Outcome: Progressing     Problem: DISCHARGE PLANNING  Goal: Discharge to home or other facility with appropriate resources  Description: INTERVENTIONS:  - Identify barriers to discharge w/patient and caregiver  - Arrange for needed discharge resources and transportation as appropriate  - Identify discharge learning needs (meds, wound care, etc.)  - Arrange for interpretive services to assist at discharge as needed  - Refer to Case Management Department for coordinating discharge planning if the patient needs post-hospital services based on physician/advanced practitioner order or complex needs related to functional status, cognitive ability, or social support system  Outcome: Progressing     Problem: Knowledge Deficit  Goal: Patient/family/caregiver demonstrates understanding of disease process, treatment plan, medications, and discharge instructions  Description: Complete learning assessment and assess knowledge base. Interventions:  - Provide teaching at level of understanding  - Provide teaching via preferred learning methods  Outcome: Progressing     Problem: Nutrition/Hydration-ADULT  Goal: Nutrient/Hydration intake appropriate for improving, restoring or maintaining nutritional needs  Description: Monitor and assess patient's nutrition/hydration status for malnutrition. Collaborate with interdisciplinary team and initiate plan and interventions as ordered. Monitor patient's weight and dietary intake as ordered or per policy. Utilize nutrition screening tool and intervene as necessary. Determine patient's food preferences and provide high-protein, high-caloric foods as appropriate.      INTERVENTIONS:  - Monitor oral intake, urinary output, labs, and treatment plans  - Assess nutrition and hydration status and recommend course of action  - Evaluate amount of meals eaten  - Assist patient with eating if necessary   - Allow adequate time for meals  - Recommend/ encourage appropriate diets, oral nutritional supplements, and vitamin/mineral supplements  - Order, calculate, and assess calorie counts as needed  - Recommend, monitor, and adjust tube feedings and TPN/PPN based on assessed needs  - Assess need for intravenous fluids  - Provide specific nutrition/hydration education as appropriate  - Include patient/family/caregiver in decisions related to nutrition  Outcome: Progressing     Problem: GASTROINTESTINAL - ADULT  Goal: Maintains or returns to baseline bowel function  Description: INTERVENTIONS:  - Assess bowel function  - Encourage oral fluids to ensure adequate hydration  - Administer IV fluids if ordered to ensure adequate hydration  - Administer ordered medications as needed  - Encourage mobilization and activity  - Consider nutritional services referral to assist patient with adequate nutrition and appropriate food choices  Outcome: Progressing     Problem: GENITOURINARY - ADULT  Goal: Urinary catheter remains patent  Description: INTERVENTIONS:  - Assess patency of condom catheter     Outcome: Progressing     Problem: CONFUSION/THOUGHT DISTURBANCE  Goal: Thought disturbances (confusion, delirium, depression, dementia or psychosis) are managed to maintain or return to baseline mental status and functional level  Description: INTERVENTIONS:  - Assess for possible contributors to  thought disturbance, including but not limited to medications, infection, impaired vision or hearing, underlying metabolic abnormalities, dehydration, respiratory compromise,  psychiatric diagnoses and notify attending PHYSICAN/AP  - Monitor and intervene to maintain adequate nutrition, hydration, elimination, sleep and activity  - Decrease environmental stimuli, including noise as appropriate. - Provide frequent contacts to provide refocusing, direction and reassurance as needed. Approach patient calmly with eye contact and at their level.   - [place] high risk fall precautions, aspiration precautions and other safety measures, as indicated  - If delirium suspected, notify physician/AP of change in condition and request immediate in-person evaluation  - Pursue consults as appropriate including Geriatric (campus dependent), OT for cognitive evaluation/activity planning, psychiatric, pastoral care, etc.  Outcome: Progressing

## 2020-10-24 NOTE — ASSESSMENT & PLAN NOTE
· DREW likely secondary to poor intake with lisinopril use  · Hold lisinopril. · Hold metformin. · Continue to trend.   Appears back to baseline   · Nephrology following, spoke with Dr. Maria Dolores Peralta today   · Monitoring off IVF hydration     Results from last 7 days   Lab Units 10/24/20  0434 10/24/20  0335 10/21/20  0428 10/20/20  0516 10/19/20  0443 10/18/20  0907   BUN mg/dL 17 18 27* 18 19 25   CREATININE mg/dL 1.21 1.17 1.48* 1.18 1.43* 1.41*

## 2020-10-24 NOTE — PROGRESS NOTES
Progress Note - Maurice Villalpando Jennsusmorel 1950, 69 y.o. male MRN: [id number]  Unit/Bed#: E2 -01 Encounter: [id number]  Primary Care Provider: Nas Boyce MD   Date and time admitted to hospital: 10/15/2020  8:46 PM    * Sepsis [facility])  Assessment & Plan  · Sepsis POA thought to be secondary to pneumonia originally however original blood cultures on 10/15 growing MRSA and urine culture growing the same   · Infectious Disease consulted   · Continue on IV vancomycin for 4 weeks total through 11/14/20   · repeat blood cultures obtained on 10/18 negative to date   · S/p PICC placement on 10/22 unfortunately, patient removed his PICC yesterday and Interventional Radiology was unable to replace PICC yesterday due to agitation therefore discharge was canceled  · Will need to discuss additional plan with Infectious Disease regarding long-term IV antibiotics as patient would be high risk for removing his PICC again upon discharge  · Continue maintain IV access at this time for IV vancomycin infusion  · Weekly CBC with diff, BMP and vanc trough while on IV antibiotics  · Infectious Disease follow up outpatient         MRSA bacteremia  Assessment & Plan  1 out of 2 blood cultures positive on admission growing MRSA   Urine culture growing the same   Repeat blood cultures negative to date  S/p PICC placement on 10/22  unfortunately patient removed his PICC line yesterday 10/23 and Interventional Radiology was unable to replace PICC. See above   Echo: no vegetation/endocarditis   outpatient ID follow up   See above       UTI (urinary tract infection)  Assessment & Plan  MRSA UTI   Continue IV vancomycin    See plan above     DREW (acute kidney injury) (HCC)-resolved as of 10/19/2020  Assessment & Plan  · DREW likely secondary to poor intake with lisinopril use  · Hold lisinopril. · Hold metformin. · Continue to trend.   Appears back to baseline   · Nephrology following, spoke with Dr. Nanda Saenz today   · Monitoring off IVF hydration     Results from last 7 days   Lab Units 10/24/20  0434 10/24/20  0335 10/21/20  0428 10/20/20  0516 10/19/20  0443 10/18/20  0907   BUN mg/dL 17 18 27* 18 19 25   CREATININE mg/dL 1.21 1.17 1.48* 1.18 1.43* 1.41*       Hyperlipidemia  Assessment & Plan  · Hyperlipidemia. · Continue statin     Hypertension  Assessment & Plan  · Lisinopril held on admission given DREW  · Nephrology recommending NOT restarting ACE-I on discharge   · Started on amlodipine, increased to 5 mg once daily with improvement in BP   · outpatient follow up with PCP     Parkinson disease ([address] [facility])  Assessment & Plan  · Parkinson's dementia not much verbal at baseline and essentially bed/wheelchair bound  · Continue sinemet 25/100 t.i.d.  · speech eval - level 2 with thin liquids   · Spoke with patient's daughter-in-law at length using blue phone, patient lives with her and son and is non-verbal at baseline,  He is non ambulatory and they carry him to a recliner, she states he is at his baseline and  Has been this way since 2009, she states he always eats slowly takes about an hour to eat    Diabetes mellitus [facility])  Assessment & Plan  Lab Results   Component Value Date    HGBA1C 7.7 (H) 10/19/2020     Results from last 7 days   Lab Units 10/24/20  0434   GLUCOSE RANDOM mg/dL 243*     · Diabetes mellitus with hyperglycemia. · Continue Lantus 10 units q.h.s.   · Sliding scale insulin, Accu-Cheks  · Diabetic diet    GERD (gastroesophageal reflux disease)  Assessment & Plan  · GERD continue PPI    Anemia  Assessment & Plan  In setting of CKD   Will need workup as outpatient with PCP   No evidence of active bleeding     Lab Results   Component Value Date    HGB 9.6 (L) 10/20/2020    HGB 9.2 (L) 10/19/2020    HGB 8.7 (L) 10/18/2020    HGB 8.2 (L) 10/17/2020    HGB 9.0 (L) 10/16/2020         CKD (chronic kidney disease) stage 3, GFR 30-59 ml/min  Assessment & Plan  Lab Results   Component Value Date    EGFR 60 10/24/2020    EGFR 62 10/24/2020    EGFR 47 10/21/2020    CREATININE 1.21 10/24/2020    CREATININE 1.17 10/24/2020    CREATININE 1.48 (H) 10/21/2020     DREW resolved   Renal function baseline appears to be around 1.4   At baseline   outpatient follow up with Nephrology       VTE Pharmacologic Prophylaxis:   Pharmacologic: Heparin  Mechanical VTE Prophylaxis in Place: No    Patient Centered Rounds: I have performed bedside rounds with nursing staff today. Discussions with Specialists or Other Care Team Provider:  Case management    Education and Discussions with Family / Patient: Will discuss with family once present at the bedside    Time Spent for Care: 20 minutes. More than 50% of total time spent on counseling and coordination of care as described above. Current Length of Stay: 9 day(s)    Current Patient Status: Inpatient   Certification Statement: The patient will continue to require additional inpatient hospital stay due to Bacteremia    Discharge Plan:  Discharge plan    Code Status: Level 1 - Full Code      Subjective:   Patient was lot of for discharge yesterday, unfortunately he removed his PICC line and was unable to be placed replaced by Interventional Radiology. He is calm in bed currently in no acute distress without agitation. He does say “vick" otherwise nonverbal.    Objective:     Vitals:   Temp (24hrs), Av.9 °F (36.6 °C), Min:97.5 °F (36.4 °C), Max:98.2 °F (36.8 °C)    Temp:  [97.5 °F (36.4 °C)-98.2 °F (36.8 °C)] 98.2 °F (36.8 °C)  HR:  [65-72] 72  Resp:  [16-20] 20  BP: (139-142)/(78-83) 139/78  SpO2:  [97 %-99 %] 97 %  Body mass index is 21.48 kg/m². Input and Output Summary (last 24 hours): Intake/Output Summary (Last 24 hours) at 10/24/2020 1408  Last data filed at 10/24/2020 1300  Gross per 24 hour   Intake 220 ml   Output 250 ml   Net -30 ml       Physical Exam:     Physical Exam  Vitals signs and nursing note reviewed.    Constitutional:       General: He is not in acute distress. Cardiovascular:      Rate and Rhythm: Normal rate and regular rhythm. Pulmonary:      Effort: Pulmonary effort is normal. No respiratory distress. Breath sounds: Normal breath sounds. No wheezing or rales. Abdominal:      General: Bowel sounds are normal.      Palpations: Abdomen is soft. Musculoskeletal:      Right lower leg: No edema. Left lower leg: No edema. Skin:     General: Skin is warm. Neurological:      Mental Status: He is alert. Comments: Primarily nonverbal, says "mariano" and "vick" occasionally, at baseline     Psychiatric:         Cognition and Memory: Cognition is impaired. Comments: No agitation at this time         Additional Data:     Labs:    Results from last 7 days   Lab Units 10/20/20  0516   WBC Thousand/uL 8.31   HEMOGLOBIN g/dL 9.6*   HEMATOCRIT % 31.2*   PLATELETS Thousands/uL 285   NEUTROS PCT % 64   LYMPHS PCT % 25   MONOS PCT % 6   EOS PCT % 4     Results from last 7 days   Lab Units 10/24/20  0434  10/19/20  0443   SODIUM mmol/L 134*   < > 138   POTASSIUM mmol/L 4.2   < > 4.1   CHLORIDE mmol/L 100   < > 104   CO2 mmol/L 21   < > 24   BUN mg/dL 17   < > 19   CREATININE mg/dL 1.21   < > 1.43*   ANION GAP mmol/L 13   < > 10   CALCIUM mg/dL 8.9   < > 9.1   ALBUMIN g/dL  --   --  2.5*   TOTAL BILIRUBIN mg/dL  --   --  0.32   ALK PHOS U/L  --   --  102   ALT U/L  --   --  59   AST U/L  --   --  76*   GLUCOSE RANDOM mg/dL 243*   < > 119    < > = values in this interval not displayed. Results from last 7 days   Lab Units 10/24/20  1205 10/24/20  0814 10/24/20  0627 10/23/20  2104 10/23/20  1610 10/23/20  1113 10/23/20  0631 10/22/20  2051 10/22/20  1639 10/22/20  1115 10/22/20  0627 10/21/20  2021   POC GLUCOSE mg/dl 115 120 128 192* 248* 153* 119 250* 150* 239* 138 212*     Results from last 7 days   Lab Units 10/19/20  0443   HEMOGLOBIN A1C % 7.7*               * I Have Reviewed All Lab Data Listed Above.   * Additional Pertinent Lab Tests Reviewed: No New Labs Available For Today    Imaging:    Imaging Reports Reviewed Today Include:   Imaging Personally Reviewed by Myself Includes:      Recent Cultures (last 7 days):     Results from last 7 days   Lab Units 10/18/20  1627 10/18/20  1626   BLOOD CULTURE  No Growth After 5 Days. No Growth After 5 Days. Last 24 Hours Medication List:   Current Facility-Administered Medications   Medication Dose Route Frequency Provider Last Rate   • acetaminophen  650 mg Oral Q6H PRN Melia Soraya, DO     • amLODIPine  5 mg Oral Daily Soumya Knox, DO     • atorvastatin  40 mg Oral Daily With [employer], DO     • carbidopa-levodopa  1 tablet Oral TID Melia Lapva, DO     • heparin (porcine)  5,000 Units Subcutaneous [facility] Gian[facility], DO     • hydrALAZINE  5 mg Intravenous Q6H PRN Soumya Knox, DO     • influenza vaccine  0.7 mL Intramuscular Once [employer], DO     • insulin glargine  10 Units Subcutaneous HS Freddy Patch, DO     • insulin lispro  1-6 Units Subcutaneous 4x Daily (AC & HS) Melia Soraya, DO     • levalbuterol  1.25 mg Nebulization Q6H PRN Melia Lapidus, DO     • ondansetron  4 mg Intravenous Q6H PRN Melia Lapidus, DO     • pantoprazole  40 mg Intravenous Q24H [facility] Makayla Burnette PA-C     • senna-docusate sodium  1 tablet Oral BID Freddy Patch, DO     • traZODone  150 mg Oral HS Melia Lapidus, DO     • vancomycin  750 mg Intravenous Q12H Sincere Stevenson  mg (10/24/20 5475)        Today, Patient Was Seen By: Rosalva Pool PA-C    ** Please Note: Dictation voice to text software may have been used in the creation of this document.  **

## 2020-10-24 NOTE — ASSESSMENT & PLAN NOTE
· Sepsis POA thought to be secondary to pneumonia originally however original blood cultures on 10/15 growing MRSA and urine culture growing the same   · Infectious Disease consulted   · Continue on IV vancomycin for 4 weeks total through 11/14/20   · repeat blood cultures obtained on 10/18 negative to date   · S/p PICC placement on 10/22 unfortunately, patient removed his PICC yesterday and Interventional Radiology was unable to replace PICC yesterday due to agitation therefore discharge was canceled  · Will need to discuss additional plan with Infectious Disease regarding long-term IV antibiotics as patient would be high risk for removing his PICC again upon discharge  · Continue maintain IV access at this time for IV vancomycin infusion  · Weekly CBC with diff, BMP and vanc trough while on IV antibiotics  · Infectious Disease follow up outpatient

## 2020-10-24 NOTE — ASSESSMENT & PLAN NOTE
Lab Results   Component Value Date    EGFR 60 10/24/2020    EGFR 62 10/24/2020    EGFR 47 10/21/2020    CREATININE 1.21 10/24/2020    CREATININE 1.17 10/24/2020    CREATININE 1.48 (H) 10/21/2020     DREW resolved   Renal function baseline appears to be around 1.4   At baseline   outpatient follow up with Nephrology

## 2020-10-24 NOTE — ASSESSMENT & PLAN NOTE
1 out of 2 blood cultures positive on admission growing MRSA   Urine culture growing the same   Repeat blood cultures negative to date  S/p PICC placement on 10/22  unfortunately patient removed his PICC line yesterday 10/23 and Interventional Radiology was unable to replace PICC.    See above   Echo: no vegetation/endocarditis   outpatient ID follow up   See above

## 2020-10-24 NOTE — ASSESSMENT & PLAN NOTE
Lab Results   Component Value Date    HGBA1C 7.7 (H) 10/19/2020     Results from last 7 days   Lab Units 10/24/20  0434   GLUCOSE RANDOM mg/dL 243*     · Diabetes mellitus with hyperglycemia. · Continue Lantus 10 units q.h.s.   · Sliding scale insulin, Accu-Cheks  · Diabetic diet

## 2020-10-24 NOTE — PROGRESS NOTES
Spoke with Dr. Aurea Garcias via tiger text (6:50 pm) and he said we will try to place an IV tomorrow.

## 2020-10-24 NOTE — PROGRESS NOTES
Admin times were off and last dose given late. Trough was drawn early.  Dosage times adjusted and trough will need to be redrawn at 730 on 10/25

## 2020-10-25 LAB
GLUCOSE SERPL-MCNC: 104 MG/DL (ref 65–140)
GLUCOSE SERPL-MCNC: 168 MG/DL (ref 65–140)
GLUCOSE SERPL-MCNC: 239 MG/DL (ref 65–140)
GLUCOSE SERPL-MCNC: 92 MG/DL (ref 65–140)
GLUCOSE SERPL-MCNC: 93 MG/DL (ref 65–140)

## 2020-10-25 PROCEDURE — C9113 INJ PANTOPRAZOLE SODIUM, VIA: HCPCS | Performed by: PHYSICIAN ASSISTANT

## 2020-10-25 PROCEDURE — 82948 REAGENT STRIP/BLOOD GLUCOSE: CPT

## 2020-10-25 PROCEDURE — 99232 SBSQ HOSP IP/OBS MODERATE 35: CPT | Performed by: PHYSICIAN ASSISTANT

## 2020-10-25 RX ADMIN — VANCOMYCIN HYDROCHLORIDE 750 MG: 750 INJECTION, SOLUTION INTRAVENOUS at 20:20

## 2020-10-25 RX ADMIN — HEPARIN SODIUM 5000 UNITS: 5000 INJECTION INTRAVENOUS; SUBCUTANEOUS at 14:58

## 2020-10-25 RX ADMIN — AMLODIPINE BESYLATE 5 MG: 5 TABLET ORAL at 09:49

## 2020-10-25 RX ADMIN — INSULIN GLARGINE 10 UNITS: 100 INJECTION, SOLUTION SUBCUTANEOUS at 21:40

## 2020-10-25 RX ADMIN — DOCUSATE SODIUM AND SENNOSIDES 1 TABLET: 8.6; 5 TABLET, FILM COATED ORAL at 09:49

## 2020-10-25 RX ADMIN — VANCOMYCIN HYDROCHLORIDE 750 MG: 750 INJECTION, SOLUTION INTRAVENOUS at 09:48

## 2020-10-25 RX ADMIN — INSULIN LISPRO 1 UNITS: 100 INJECTION, SOLUTION INTRAVENOUS; SUBCUTANEOUS at 17:35

## 2020-10-25 RX ADMIN — HEPARIN SODIUM 5000 UNITS: 5000 INJECTION INTRAVENOUS; SUBCUTANEOUS at 05:52

## 2020-10-25 RX ADMIN — CARBIDOPA AND LEVODOPA 1 TABLET: 25; 100 TABLET ORAL at 20:16

## 2020-10-25 RX ADMIN — HEPARIN SODIUM 5000 UNITS: 5000 INJECTION INTRAVENOUS; SUBCUTANEOUS at 21:41

## 2020-10-25 RX ADMIN — ATORVASTATIN CALCIUM 40 MG: 40 TABLET, FILM COATED ORAL at 17:34

## 2020-10-25 RX ADMIN — CARBIDOPA AND LEVODOPA 1 TABLET: 25; 100 TABLET ORAL at 17:33

## 2020-10-25 RX ADMIN — DOCUSATE SODIUM AND SENNOSIDES 1 TABLET: 8.6; 5 TABLET, FILM COATED ORAL at 20:16

## 2020-10-25 RX ADMIN — PANTOPRAZOLE SODIUM 40 MG: 40 INJECTION, POWDER, FOR SOLUTION INTRAVENOUS at 05:52

## 2020-10-25 RX ADMIN — INSULIN LISPRO 3 UNITS: 100 INJECTION, SOLUTION INTRAVENOUS; SUBCUTANEOUS at 21:40

## 2020-10-25 RX ADMIN — CARBIDOPA AND LEVODOPA 1 TABLET: 25; 100 TABLET ORAL at 09:49

## 2020-10-25 NOTE — NURSING NOTE
Patient with scheduled vanco trough, tried to extract blood but patient was uncooperative and aggressive.  Pharmacy made aware and they said just to give the scheduled IV vanco

## 2020-10-25 NOTE — ASSESSMENT & PLAN NOTE
Lab Results   Component Value Date    EGFR 60 10/24/2020    EGFR 62 10/24/2020    EGFR 47 10/21/2020    CREATININE 1.21 10/24/2020    CREATININE 1.17 10/24/2020    CREATININE 1.48 (H) 10/21/2020     DREW resolved   Renal function baseline appears to be around 1.4   At baseline   outpatient follow up with Nephrology   Repeat BMP in AM

## 2020-10-25 NOTE — ASSESSMENT & PLAN NOTE
1 out of 2 blood cultures positive on admission growing MRSA   Urine culture growing the same   Repeat blood cultures negative to date  S/p PICC placement on 10/22  unfortunately patient removed his PICC line 10/23 and Interventional Radiology was unable to replace PICC that day due to agitation and unsafe insertion   An IV therefore was placed however patient removed his IV last evening, IV again replaced.  Will place patient on 1:1 for safety and interference with medical treatment   Will attempt to have PICC replaced tomorrow, please see below   Will follow up after I discuss with family today      Echo: no vegetation/endocarditis   outpatient ID follow up   See above

## 2020-10-25 NOTE — ASSESSMENT & PLAN NOTE
· Sepsis POA thought to be secondary to pneumonia originally however original blood cultures on 10/15 growing MRSA and urine culture growing the same   · Infectious Disease consulted   · Continue on IV vancomycin for 4 weeks total through 11/14/20   · repeat blood cultures obtained on 10/18 negative to date   · S/p PICC placement on 10/22 unfortunately, patient removed his PICC 10/23 and Interventional Radiology was unable to replace PICC yesterday due to agitation therefore discharge was canceled  · He then removed his IV last evening   · See below   · Will attempt to replace PICC tomorrow   · Continue maintain IV access at this time for IV vancomycin infusion  · Start on 1:1   · Weekly CBC with diff, BMP and vanc trough while on IV antibiotics  · Infectious Disease follow up outpatient   · Check CBC and BMP tomorrow

## 2020-10-25 NOTE — QUICK NOTE
I had a family meeting today using blue phone interpretation services with patient's son,Jorge and daughter-in-law. I summarized patient's hospital course to date and the events that transpired resulting in cancelling his discharge on Friday. I discussed my concerns at length regarding patient's dementia and him removing PICC again if replaced and our goals of care if this happens and/or if he does not tolerate PICC placement again tomorrow. I mentioned hospice to the family and Lluvia Smith adamantly refused the concept stating absolutely not. They continue to wish to bring patient home, are not receptive of hospice or placement. Son states he is home all day and between him and his wife are able to care for the patient has they have been doing "for years". Their combined wishes are to proceed with re-attempt at PICC insertion tomorrow. They are agreeable to medicaiton to help with agitation during procedure. They are also agreeable to use of soft restraints or mitts as needed to prevent intervention/disruption in care. I discussed that in the event we can not replace PICC we would need to have another discussion about goals. I also discussed code status and they want to continue level 1 full code.

## 2020-10-25 NOTE — PROGRESS NOTES
Vancomycin IV Pharmacy-to-Dose Consultation    Tapan House is a 69 y.o. male who is currently receiving Vancomycin IV with management by the Pharmacy Consult service. Assessment/Plan:  The patient was reviewed. Renal function is stable and no signs or symptoms of nephrotoxicity and/or infusion reactions were documented in the chart. Pt had  a vanco trough scheduled this morning prior to dose. Per RN, pt is refusing trough at this time. Advised RN to hang the dose for now and will reschedule trough. Based on today’s assessment, continue current vancomycin (day # 7) dosing of 750mg IV every 12 hours with a plan for trough to be drawn at [address] [place] [address] on 10/26/20    We will continue to follow the patient’s culture results and clinical progress daily.     Janice Marin, Pharmacist

## 2020-10-25 NOTE — PROGRESS NOTES
Progress Note - Kristie Minor Cristian 1950, 69 y.o. male MRN: [id number]  Unit/Bed#: E2 -01 Encounter: [id number]  Primary Care Provider: Sussy Busch MD   Date and time admitted to hospital: 10/15/2020  8:46 PM    * Sepsis [facility])  Assessment & Plan  · Sepsis POA thought to be secondary to pneumonia originally however original blood cultures on 10/15 growing MRSA and urine culture growing the same   · Infectious Disease consulted   · Continue on IV vancomycin for 4 weeks total through 11/14/20   · repeat blood cultures obtained on 10/18 negative to date   · S/p PICC placement on 10/22 unfortunately, patient removed his PICC 10/23 and Interventional Radiology was unable to replace PICC yesterday due to agitation therefore discharge was canceled  · He then removed his IV last evening   · See below   · Will attempt to replace PICC tomorrow   · Continue maintain IV access at this time for IV vancomycin infusion  · Start on 1:1   · Weekly CBC with diff, BMP and vanc trough while on IV antibiotics  · Infectious Disease follow up outpatient   · Check CBC and BMP tomorrow         MRSA bacteremia  Assessment & Plan  1 out of 2 blood cultures positive on admission growing MRSA   Urine culture growing the same   Repeat blood cultures negative to date  S/p PICC placement on 10/22  unfortunately patient removed his PICC line 10/23 and Interventional Radiology was unable to replace PICC that day due to agitation and unsafe insertion   An IV therefore was placed however patient removed his IV last evening, IV again replaced.  Will place patient on 1:1 for safety and interference with medical treatment   Will attempt to have PICC replaced tomorrow, please see below   Will follow up after I discuss with family today      Echo: no vegetation/endocarditis   outpatient ID follow up   See above       UTI (urinary tract infection)  Assessment & Plan  MRSA UTI   Continue IV vancomycin    See plan above Hyperlipidemia  Assessment & Plan  · Hyperlipidemia. · Continue statin     Hypertension  Assessment & Plan  · Lisinopril held on admission given DREW  · Nephrology recommending NOT restarting ACE-I on discharge   · Started on amlodipine, increased to 5 mg once daily with improvement in BP   · outpatient follow up with PCP     Parkinson disease ([facility])  Assessment & Plan  · Parkinson's dementia not much verbal at baseline and essentially bed/wheelchair bound  · Continue sinemet 25/100 t.i.d.  · speech eval - level 2 with thin liquids   · Spoke with patient's daughter-in-law at length using blue phone, patient lives with her and son and is non-verbal at baseline,  He is non ambulatory and they carry him to a recliner, she states he is at his baseline and  Has been this way since 2009, she states he always eats slowly takes about an hour to eat  · Patient pulled out his PICC line on Friday and IR was unable to replaced due to agitation and unsafe insertion, then patient removed his IV last evening, IV replaced last night. Will place on 1:1 observation as removing IV access is interfering with medical treatment and discharge planning   · Will have a discussion with family today once at bedside regarding goals   · Will try to replace PICC line tomorrow, patient will need 24 hours supervision at home to ensure he does not remove due to need for long term abx, if patient removes his PICC at home we have limited option and may need hospice, will discuss this plan with patient at length today     Diabetes mellitus ([address] )  Assessment & Plan  Lab Results   Component Value Date    HGBA1C 7.7 (H) 10/19/2020     Results from last 7 days   Lab Units 10/24/20  0434   GLUCOSE RANDOM mg/dL 243*     · Diabetes mellitus with hyperglycemia. · Continue Lantus 10 units q.h.s.   · Sliding scale insulin, Accu-Cheks  · Diabetic diet    GERD (gastroesophageal reflux disease)  Assessment & Plan  · GERD continue PPI    Anemia  Assessment & Plan  In setting of CKD   Will need workup as outpatient with PCP   No evidence of active bleeding     Lab Results   Component Value Date    HGB 9.6 (L) 10/20/2020    HGB 9.2 (L) 10/19/2020    HGB 8.7 (L) 10/18/2020    HGB 8.2 (L) 10/17/2020    HGB 9.0 (L) 10/16/2020         CKD (chronic kidney disease) stage 3, GFR 30-59 ml/min  Assessment & Plan  Lab Results   Component Value Date    EGFR 60 10/24/2020    EGFR 62 10/24/2020    EGFR 47 10/21/2020    CREATININE 1.21 10/24/2020    CREATININE 1.17 10/24/2020    CREATININE 1.48 (H) 10/21/2020     DREW resolved   Renal function baseline appears to be around 1.4   At baseline   outpatient follow up with Nephrology   Repeat BMP in AM       VTE Pharmacologic Prophylaxis:   Pharmacologic: Heparin  Mechanical VTE Prophylaxis in Place: No    Patient Centered Rounds: I have performed bedside rounds with nursing staff today. Discussions with Specialists or Other Care Team Provider:     Education and Discussions with Family / Patient: will discuss with family once at bedside today     Time Spent for Care: 20 minutes. More than 50% of total time spent on counseling and coordination of care as described above. Current Length of Stay: 10 day(s)    Current Patient Status: Inpatient   Certification Statement: The patient will continue to require additional inpatient hospital stay due to plan for long term abx     Discharge Plan: pending re-insertion of PICC for long term abx     Code Status: Level 1 - Full Code      Subjective:   Patient is primarily nonverbal, says "vick". In no distress at this time. Objective:     Vitals:   Temp (24hrs), Av.3 °F (36.8 °C), Min:98.3 °F (36.8 °C), Max:98.3 °F (36.8 °C)    Temp:  [98.3 °F (36.8 °C)] 98.3 °F (36.8 °C)  HR:  [73] 73  Resp:  [18-20] 18  BP: (140-156)/(73-74) 156/73  SpO2:  [97 %-98 %] 97 %  Body mass index is 21.48 kg/m². Input and Output Summary (last 24 hours):        Intake/Output Summary (Last 24 hours) at 10/25/2020 1029  Last data filed at 10/24/2020 1300  Gross per 24 hour   Intake 220 ml   Output --   Net 220 ml       Physical Exam:     Physical Exam  Vitals signs and nursing note reviewed. Constitutional:       General: He is not in acute distress. HENT:      Head: Normocephalic. Eyes:      Conjunctiva/sclera: Conjunctivae normal.   Cardiovascular:      Rate and Rhythm: Normal rate and regular rhythm. Pulmonary:      Effort: Pulmonary effort is normal.      Breath sounds: Normal breath sounds. Abdominal:      General: Bowel sounds are normal.      Palpations: Abdomen is soft. Musculoskeletal:      Right lower leg: No edema. Left lower leg: No edema. Neurological:      Mental Status: He is alert. Comments: Says "vick" primarily nonverbal    Psychiatric:         Cognition and Memory: Cognition is impaired. Comments: No distress or agitation at this time            Additional Data:     Labs:    Results from last 7 days   Lab Units 10/20/20  0516   WBC Thousand/uL 8.31   HEMOGLOBIN g/dL 9.6*   HEMATOCRIT % 31.2*   PLATELETS Thousands/uL 285   NEUTROS PCT % 64   LYMPHS PCT % 25   MONOS PCT % 6   EOS PCT % 4     Results from last 7 days   Lab Units 10/24/20  0434  10/19/20  0443   SODIUM mmol/L 134*   < > 138   POTASSIUM mmol/L 4.2   < > 4.1   CHLORIDE mmol/L 100   < > 104   CO2 mmol/L 21   < > 24   BUN mg/dL 17   < > 19   CREATININE mg/dL 1.21   < > 1.43*   ANION GAP mmol/L 13   < > 10   CALCIUM mg/dL 8.9   < > 9.1   ALBUMIN g/dL  --   --  2.5*   TOTAL BILIRUBIN mg/dL  --   --  0.32   ALK PHOS U/L  --   --  102   ALT U/L  --   --  59   AST U/L  --   --  76*   GLUCOSE RANDOM mg/dL 243*   < > 119    < > = values in this interval not displayed.          Results from last 7 days   Lab Units 10/25/20  0856 10/24/20  2130 10/24/20  1617 10/24/20  1205 10/24/20  0814 10/24/20  0627 10/23/20  2104 10/23/20  1610 10/23/20  1113 10/23/20  0631 10/22/20  2051 10/22/20  1639   POC GLUCOSE mg/dl 92 178* 207* 115 120 128 192* 248* 153* 119 250* 150*     Results from last 7 days   Lab Units 10/19/20  0443   HEMOGLOBIN A1C % 7.7*               * I Have Reviewed All Lab Data Listed Above. * Additional Pertinent Lab Tests Reviewed: No New Labs Available For Today    Imaging:    Imaging Reports Reviewed Today Include:   Imaging Personally Reviewed by Myself Includes:      Recent Cultures (last 7 days):     Results from last 7 days   Lab Units 10/18/20  1627 10/18/20  1626   BLOOD CULTURE  No Growth After 5 Days. No Growth After 5 Days. Last 24 Hours Medication List:   Current Facility-Administered Medications   Medication Dose Route Frequency Provider Last Rate   • acetaminophen  650 mg Oral Q6H PRN Gabriela Carrier, DO     • amLODIPine  5 mg Oral Daily Malika Swain, DO     • atorvastatin  40 mg Oral Daily With [employer], DO     • carbidopa-levodopa  1 tablet Oral TID Gabriela Carrier, DO     • heparin (porcine)  5,000 Units Subcutaneous [facility] Eyal Medel, DO     • hydrALAZINE  5 mg Intravenous Q6H PRN Malika Swain, DO     • influenza vaccine  0.7 mL Intramuscular Once [employer], DO     • insulin glargine  10 Units Subcutaneous HS Niurka Parisi, DO     • insulin lispro  1-6 Units Subcutaneous 4x Daily (AC & HS) Gabriela Carrier, DO     • levalbuterol  1.25 mg Nebulization Q6H PRN Gabriela Carrier, DO     • ondansetron  4 mg Intravenous Q6H PRN Gabriela Carrier, DO     • pantoprazole  40 mg Intravenous Q24H [address] Arely Aquino PA-C     • senna-docusate sodium  1 tablet Oral BID Niurka Parisi, DO     • traZODone  150 mg Oral HS Gabriela Carrier, DO     • vancomycin  750 mg Intravenous Q12H Mac Bullard  mg (10/24/20 2119)        Today, Patient Was Seen By: Sohan Burkett PA-C    ** Please Note: Dictation voice to text software may have been used in the creation of this document.  **

## 2020-10-25 NOTE — ASSESSMENT & PLAN NOTE
· Parkinson's dementia not much verbal at baseline and essentially bed/wheelchair bound  · Continue sinemet 25/100 t.i.d.  · speech eval - level 2 with thin liquids   · Spoke with patient's daughter-in-law at length using blue phone, patient lives with her and son and is non-verbal at baseline,  He is non ambulatory and they carry him to a recliner, she states he is at his baseline and  Has been this way since 2009, she states he always eats slowly takes about an hour to eat  · Patient pulled out his PICC line on Friday and IR was unable to replaced due to agitation and unsafe insertion, then patient removed his IV last evening, IV replaced last night.  Will place on 1:1 observation as removing IV access is interfering with medical treatment and discharge planning   · Will have a discussion with family today once at bedside regarding goals   · Will try to replace PICC line tomorrow, patient will need 24 hours supervision at home to ensure he does not remove due to need for long term abx, if patient removes his PICC at home we have limited option and may need hospice, will discuss this plan with patient at length today

## 2020-10-26 LAB
ANION GAP SERPL CALCULATED.3IONS-SCNC: 9 MMOL/L (ref 4–13)
BASOPHILS # BLD AUTO: 0.04 THOUSANDS/ÂΜL (ref 0–0.1)
BASOPHILS NFR BLD AUTO: 1 % (ref 0–1)
BUN SERPL-MCNC: 15 MG/DL (ref 5–25)
CALCIUM SERPL-MCNC: 9.1 MG/DL (ref 8.3–10.1)
CHLORIDE SERPL-SCNC: 101 MMOL/L (ref 100–108)
CO2 SERPL-SCNC: 25 MMOL/L (ref 21–32)
CREAT SERPL-MCNC: 1.23 MG/DL (ref 0.6–1.3)
EOSINOPHIL # BLD AUTO: 0.18 THOUSAND/ÂΜL (ref 0–0.61)
EOSINOPHIL NFR BLD AUTO: 3 % (ref 0–6)
ERYTHROCYTE [DISTWIDTH] IN BLOOD BY AUTOMATED COUNT: 12.6 % (ref 11.6–15.1)
GFR SERPL CREATININE-BSD FRML MDRD: 59 ML/MIN/1.73SQ M
GLUCOSE SERPL-MCNC: 135 MG/DL (ref 65–140)
GLUCOSE SERPL-MCNC: 144 MG/DL (ref 65–140)
GLUCOSE SERPL-MCNC: 209 MG/DL (ref 65–140)
GLUCOSE SERPL-MCNC: 253 MG/DL (ref 65–140)
GLUCOSE SERPL-MCNC: 94 MG/DL (ref 65–140)
HCT VFR BLD AUTO: 29.7 % (ref 36.5–49.3)
HGB BLD-MCNC: 9.1 G/DL (ref 12–17)
IMM GRANULOCYTES # BLD AUTO: 0.01 THOUSAND/UL (ref 0–0.2)
IMM GRANULOCYTES NFR BLD AUTO: 0 % (ref 0–2)
LYMPHOCYTES # BLD AUTO: 2.21 THOUSANDS/ÂΜL (ref 0.6–4.47)
LYMPHOCYTES NFR BLD AUTO: 33 % (ref 14–44)
MCH RBC QN AUTO: 28.8 PG (ref 26.8–34.3)
MCHC RBC AUTO-ENTMCNC: 30.6 G/DL (ref 31.4–37.4)
MCV RBC AUTO: 94 FL (ref 82–98)
MONOCYTES # BLD AUTO: 0.65 THOUSAND/ÂΜL (ref 0.17–1.22)
MONOCYTES NFR BLD AUTO: 10 % (ref 4–12)
NEUTROPHILS # BLD AUTO: 3.54 THOUSANDS/ÂΜL (ref 1.85–7.62)
NEUTS SEG NFR BLD AUTO: 53 % (ref 43–75)
NRBC BLD AUTO-RTO: 0 /100 WBCS
PLATELET # BLD AUTO: 290 THOUSANDS/UL (ref 149–390)
PMV BLD AUTO: 11.3 FL (ref 8.9–12.7)
POTASSIUM SERPL-SCNC: 3.9 MMOL/L (ref 3.5–5.3)
RBC # BLD AUTO: 3.16 MILLION/UL (ref 3.88–5.62)
SODIUM SERPL-SCNC: 135 MMOL/L (ref 136–145)
VANCOMYCIN TROUGH SERPL-MCNC: 18.3 UG/ML (ref 10–20)
WBC # BLD AUTO: 6.63 THOUSAND/UL (ref 4.31–10.16)

## 2020-10-26 PROCEDURE — 36569 INSJ PICC 5 YR+ W/O IMAGING: CPT

## 2020-10-26 PROCEDURE — 80202 ASSAY OF VANCOMYCIN: CPT | Performed by: PHYSICIAN ASSISTANT

## 2020-10-26 PROCEDURE — 80048 BASIC METABOLIC PNL TOTAL CA: CPT | Performed by: PHYSICIAN ASSISTANT

## 2020-10-26 PROCEDURE — C1751 CATH, INF, PER/CENT/MIDLINE: HCPCS

## 2020-10-26 PROCEDURE — 99233 SBSQ HOSP IP/OBS HIGH 50: CPT | Performed by: INTERNAL MEDICINE

## 2020-10-26 PROCEDURE — 99232 SBSQ HOSP IP/OBS MODERATE 35: CPT | Performed by: PHYSICIAN ASSISTANT

## 2020-10-26 PROCEDURE — 82948 REAGENT STRIP/BLOOD GLUCOSE: CPT

## 2020-10-26 PROCEDURE — C9113 INJ PANTOPRAZOLE SODIUM, VIA: HCPCS | Performed by: PHYSICIAN ASSISTANT

## 2020-10-26 PROCEDURE — 85025 COMPLETE CBC W/AUTO DIFF WBC: CPT | Performed by: PHYSICIAN ASSISTANT

## 2020-10-26 RX ORDER — LORAZEPAM 2 MG/ML
0.5 INJECTION INTRAMUSCULAR ONCE
Status: COMPLETED | OUTPATIENT
Start: 2020-10-26 | End: 2020-10-26

## 2020-10-26 RX ADMIN — INSULIN LISPRO 3 UNITS: 100 INJECTION, SOLUTION INTRAVENOUS; SUBCUTANEOUS at 11:51

## 2020-10-26 RX ADMIN — CARBIDOPA AND LEVODOPA 1 TABLET: 25; 100 TABLET ORAL at 16:51

## 2020-10-26 RX ADMIN — LORAZEPAM 0.5 MG: 2 INJECTION INTRAMUSCULAR; INTRAVENOUS at 09:10

## 2020-10-26 RX ADMIN — AMLODIPINE BESYLATE 5 MG: 5 TABLET ORAL at 08:24

## 2020-10-26 RX ADMIN — ATORVASTATIN CALCIUM 40 MG: 40 TABLET, FILM COATED ORAL at 16:51

## 2020-10-26 RX ADMIN — HEPARIN SODIUM 5000 UNITS: 5000 INJECTION INTRAVENOUS; SUBCUTANEOUS at 05:17

## 2020-10-26 RX ADMIN — HEPARIN SODIUM 5000 UNITS: 5000 INJECTION INTRAVENOUS; SUBCUTANEOUS at 14:23

## 2020-10-26 RX ADMIN — VANCOMYCIN HYDROCHLORIDE 750 MG: 750 INJECTION, SOLUTION INTRAVENOUS at 12:17

## 2020-10-26 RX ADMIN — CARBIDOPA AND LEVODOPA 1 TABLET: 25; 100 TABLET ORAL at 08:24

## 2020-10-26 RX ADMIN — PANTOPRAZOLE SODIUM 40 MG: 40 INJECTION, POWDER, FOR SOLUTION INTRAVENOUS at 05:17

## 2020-10-26 RX ADMIN — DOCUSATE SODIUM AND SENNOSIDES 1 TABLET: 8.6; 5 TABLET, FILM COATED ORAL at 17:34

## 2020-10-26 RX ADMIN — DOCUSATE SODIUM AND SENNOSIDES 1 TABLET: 8.6; 5 TABLET, FILM COATED ORAL at 08:24

## 2020-10-26 NOTE — ASSESSMENT & PLAN NOTE
· Parkinson's dementia not much verbal at baseline and essentially bed/wheelchair bound  · Continue sinemet 25/100 t.i.d.  · speech eval - level 2 with thin liquids   · Spoke with patient's daughter-in-law at length using blue phone, patient lives with her and son and is non-verbal at baseline,  He is non ambulatory and they carry him to a recliner, she states he is at his baseline and  Has been this way since 2009, she states he always eats slowly takes about an hour to eat  · See my family meeting from 10/25

## 2020-10-26 NOTE — ASSESSMENT & PLAN NOTE
· Parkinson's dementia not much verbal at baseline and essentially bed/wheelchair bound  · Continue sinemet 25/100 t.i.d.  · speech eval - level 2 with thin liquids   · Spoke with patient's daughter-in-law at length using blue phone, patient lives with her and son and is non-verbal at baseline,  He is non ambulatory and they carry him to a recliner, she states he is at his baseline and  Has been this way since 2009, she states he always eats slowly takes about an hour to eat  · See my family meeting from 10/25 - Patient is a high risk for readmission due to concerns for pulling out his PICC, son and daughter-n-law state they will provide 24/7 supervision and are aware of the importance of PICC remaining in place until last dose IV abx on 11/14  · Home VNA services

## 2020-10-26 NOTE — PROGRESS NOTES
Progress Note - Infectious Disease   [facility] [place]wade 69 y.o. male MRN: [id number]  Unit/Bed#: E2 -01 Encounter: [id number]    Impression/Plan:  1. Sepsis. POA.  Fever and leukocytosis.  Was initially felt to be secondary to an aspiration event with possible pneumonia, however blood cultures show MRSA bacteremia. Fortunately the patient has clinically improved.  Fever curve and WBC count have trended down. His procalcitonin level normalized.  Repeat blood cultures were negative after 5 days.  -antibiotic as below  -monitor CBC and BMP  -monitor vitals  -supportive care     2. MRSA bacteremia. No definitive etiology although the patient's urine culture is also positive for MRSA.  As the patient does not have chronic indwelling Arevalo catheter in place, must consider the possibility of more of a disseminated process that went to the urine. TTE was negative.  Repeat blood cultures were negative after 5 days. The patient is currently receiving IV vancomycin and appears to be tolerating antibiotic treatment without difficulty. Patient will require new PICC placement as he pulled out his first one.  -continue IV vancomycin through 11/14/2020 for a total of 4 weeks of IV antibiotic treatment  -continue pharmacy consult for guidance on vancomycin dosage  -monitor CBC and BMP  -monitor vitals  -PICC will need to be removed after final dose of IV antibiotics on 11/14/2020  -patient will require outpatient ID follow up on 11/16/2020 at 10am with Dr. Rita Pereyra  -scripts for antibiotics and lab work were placed in patient chart on 10/21/2020     3. Possible MRSA UTI. More likely that this was a hematogenous process as the patient does not have chronic arevalo catheter in place. No complication seen on CT the abdomen pelvis.  -antibiotics as above  -monitor  symptoms  -monitor urine output  -no additional ID workup for now     4. Acute kidney injury. On chronic kidney disease.  Likely a pre renal issue.  Perhaps medications were playing a role.  Perhaps ATN was playing a role.  The renal function has improved with resuscitation.   -monitor BMP  -dose adjust antibiotics for renal function as needed  -continue follow-up with Nephrology     5. Type 2 diabetes mellitus with hyperglycemia without long-term insulin use.  Patient's last hemoglobin A1c was 7.7% on 10/19/2020.  Elevated blood glucose is risk factor for infection.  Recommend tighter glycemic control for overall health, especially in the setting of infection.  -blood glucose management per primary service     6. Dementia.  Associated with Alzheimer's disease and Parkinson's disease.  -supportive care    Above plan was discussed in detail with Robyn MULLIGAN PA-C. Antibiotics:  Vancomycin 9  Antibiotics 12    Subjective:  Unable to perform ROS as patient is minimally verbal with dementia. Objective:  Vitals:  Temp:  [97.4 °F (36.3 °C)-98.8 °F (37.1 °C)] 98.8 °F (37.1 °C)  HR:  [66-78] 66  Resp:  [18-20] 18  BP: (136-154)/(64-74) 136/66  SpO2:  [71 %-99 %] 99 %  Temp (24hrs), Av °F (36.7 °C), Min:97.4 °F (36.3 °C), Max:98.8 °F (37.1 °C)  Current: Temperature: 98.8 °F (37.1 °C)    Physical Exam:   General Appearance:  Alert, nontoxic, no acute distress. Appeared comfortable sitting up in bed having breakfast with PCA   Throat: Oropharynx moist without lesions. Lungs:   Clear to auscultation bilaterally; no wheezes, rhonchi or rales; respirations unlabored; patient is on room air   Heart:  RRR; no murmur, rub or gallop   Abdomen:   Soft, no facial grimace with palpation, non-distended, positive bowel sounds. Extremities: No clubbing or cyanosis, no edema   Skin: No new rashes, lesions, or draining wounds noted on exposed skin.      Labs, Imaging, & Other studies:   All pertinent labs and imaging studies were personally reviewed  Results from last 7 days   Lab Units 10/26/20  0439 10/20/20  0516   WBC Thousand/uL 6.63 8.31   HEMOGLOBIN g/dL 9.1* 9.6* PLATELETS Thousands/uL 290 285     Results from last 7 days   Lab Units 10/26/20  0439   POTASSIUM mmol/L 3.9   CHLORIDE mmol/L 101   CO2 mmol/L 25   BUN mg/dL 15   CREATININE mg/dL 1.23   EGFR ml/min/1.73sq m 59   CALCIUM mg/dL 9.1

## 2020-10-26 NOTE — ASSESSMENT & PLAN NOTE
· Sepsis POA thought to be secondary to pneumonia originally however original blood cultures on 10/15 growing MRSA and urine culture growing the same   · Infectious Disease consulted   · Continue on IV vancomycin for 4 weeks total through 11/14/20   · repeat blood cultures obtained on 10/18 negative to date   · S/p PICC placement on 10/22 unfortunately, patient removed his PICC 10/23 and Interventional Radiology was unable to replace PICC that day due to agitation therefore discharge was canceled  · PICC replaced today 10/26 successfully   · Please see my discussion with family on 10/25   · Weekly CBC with diff, BMP and vanc trough while on IV antibiotics  · Infectious Disease follow up outpatient   · PICC to be removed after last dose of IV abx   · 24/7 care at home provided by daughter-in-law and son and discharged with VNA services   · While in house provide with soft restraints as needed to maintain PICC and limit intervention in medical treatment   · Vancomycin trough today   · spoke with CM, plan to discharge patient home tomorrow

## 2020-10-26 NOTE — ASSESSMENT & PLAN NOTE
1 out of 2 blood cultures positive on admission growing MRSA   Urine culture growing the same   Repeat blood cultures negative to date  S/p PICC placement on 10/22  unfortunately patient removed his PICC line 10/23 and Interventional Radiology was unable to replace PICC that day due to agitation and unsafe insertion   PICC was replaced 10/26   continue IV abx with vancomycin through 11/14, PICC will need to be removed after last dose of abx   Echo: no vegetation/endocarditis   outpatient ID follow up   See above

## 2020-10-26 NOTE — PROGRESS NOTES
Progress Note - Jazzmine Barajassusmwade 1950, 69 y.o. male MRN: [id number]  Unit/Bed#: E2 -01 Encounter: [id number]  Primary Care Provider: Dandre Motta MD   Date and time admitted to hospital: 10/15/2020  8:46 PM       addendum:    I spoke with patient's son and daughter in law at the bedside again using blue phone interpretation services. I discussed that we successfully replaced the PICC line as planned yesterday. Patient is calm without any agitation at this time. They are requesting something to go home to limit agitation at home. I discussed with them that he will receive his evening dose of vancomycin as it will be administered very late and then we will plan to discharge him after his morning dose of vancomycin around noon to 1:00 p.m. [facility] visiting nursing will come to the house at 8:00 p.m. tomorrow for his evening dose. This was arranged by Case Management and confirmed by case management today. They will transport the patient home.        * Sepsis ([address])  Assessment & Plan  · Sepsis POA thought to be secondary to pneumonia originally however original blood cultures on 10/15 growing MRSA and urine culture growing the same   · Infectious Disease consulted   · Continue on IV vancomycin for 4 weeks total through 11/14/20   · repeat blood cultures obtained on 10/18 negative to date   · S/p PICC placement on 10/22 unfortunately, patient removed his PICC 10/23 and Interventional Radiology was unable to replace PICC that day due to agitation therefore discharge was canceled  · PICC replaced today 10/26 successfully   · Please see my discussion with family on 10/25   · Weekly CBC with diff, BMP and vanc trough while on IV antibiotics  · Infectious Disease follow up outpatient   · PICC to be removed after last dose of IV abx   · 24/7 care at home provided by daughter-in-law and son and discharged with VNA services   · While in house provide with soft restraints as needed to maintain PICC and limit intervention in medical treatment   · Vancomycin trough today   · spoke with CM, plan to discharge patient home tomorrow         MRSA bacteremia  Assessment & Plan  1 out of 2 blood cultures positive on admission growing MRSA   Urine culture growing the same   Repeat blood cultures negative to date  S/p PICC placement on 10/22  unfortunately patient removed his PICC line 10/23 and Interventional Radiology was unable to replace PICC that day due to agitation and unsafe insertion   PICC was replaced 10/26     Echo: no vegetation/endocarditis   outpatient ID follow up   See above       UTI (urinary tract infection)  Assessment & Plan  MRSA UTI   Continue IV vancomycin    See plan above     Hyperlipidemia  Assessment & Plan  · Hyperlipidemia. · Continue statin     Hypertension  Assessment & Plan  · Lisinopril held on admission given DREW  · Nephrology recommending NOT restarting ACE-I on discharge   · Started on amlodipine, increased to 5 mg once daily with improvement in BP   · outpatient follow up with PCP     Parkinson disease ([address] )  Assessment & Plan  · Parkinson's dementia not much verbal at baseline and essentially bed/wheelchair bound  · Continue sinemet 25/100 t.i.d.  · speech eval - level 2 with thin liquids   · Spoke with patient's daughter-in-law at length using blue phone, patient lives with her and son and is non-verbal at baseline,  He is non ambulatory and they carry him to a recliner, she states he is at his baseline and  Has been this way since 2009, she states he always eats slowly takes about an hour to eat  · See my family meeting from 10/25     Diabetes mellitus [facility])  Assessment & Plan  Lab Results   Component Value Date    HGBA1C 7.7 (H) 10/19/2020     Results from last 7 days   Lab Units 10/26/20  0439   GLUCOSE RANDOM mg/dL 135     · Diabetes mellitus with hyperglycemia. · Continue Lantus 10 units q.h.s.   · Sliding scale insulin, Accu-Cheks  · Diabetic diet    GERD (gastroesophageal reflux disease)  Assessment & Plan  · GERD continue PPI    Anemia  Assessment & Plan  In setting of CKD   Will need workup as outpatient with PCP   No evidence of active bleeding   Stable     Lab Results   Component Value Date    HGB 9.1 (L) 10/26/2020    HGB 9.6 (L) 10/20/2020    HGB 9.2 (L) 10/19/2020    HGB 8.7 (L) 10/18/2020    HGB 8.2 (L) 10/17/2020         CKD (chronic kidney disease) stage 3, GFR 30-59 ml/min  Assessment & Plan  Lab Results   Component Value Date    EGFR 59 10/26/2020    EGFR 60 10/24/2020    EGFR 62 10/24/2020    CREATININE 1.23 10/26/2020    CREATININE 1.21 10/24/2020    CREATININE 1.17 10/24/2020     DREW resolved   Renal function baseline appears to be around 1.4   At baseline   outpatient follow up with Nephrology           VTE Pharmacologic Prophylaxis:   Pharmacologic: Heparin  Mechanical VTE Prophylaxis in Place: No    Patient Centered Rounds: I have performed bedside rounds with nursing staff today. Discussions with Specialists or Other Care Team Provider: Infectious Disease, nursing, case management, PICC nurse     Education and Discussions with Family / Patient: will touch base with family once they are here today     Time Spent for Care: 20 minutes. More than 50% of total time spent on counseling and coordination of care as described above. Current Length of Stay: 11 day(s)    Current Patient Status: Inpatient   Certification Statement: The patient will continue to require additional inpatient hospital stay due to IV abx    Discharge Plan: tomorrow     Code Status: Level 1 - Full Code      Subjective:   Doing well. PICC successfully placed. No agitation. Says "vick" smiles today. Objective:     Vitals:   Temp (24hrs), Av °F (36.7 °C), Min:97.4 °F (36.3 °C), Max:98.8 °F (37.1 °C)    Temp:  [97.4 °F (36.3 °C)-98.8 °F (37.1 °C)] 98.8 °F (37.1 °C)  HR:  [66-78] 66  Resp:  [18-20] 18  BP: (136-154)/(64-74) 136/66  SpO2:  [71 %-99 %] 99 %  Body mass index is 21.65 kg/m². Input and Output Summary (last 24 hours):     No intake or output data in the 24 hours ending 10/26/20 1033    Physical Exam:     Physical Exam  Vitals signs and nursing note reviewed. Constitutional:       General: He is not in acute distress. Comments: Appears chronically ill    Eyes:      Conjunctiva/sclera: Conjunctivae normal.   Cardiovascular:      Rate and Rhythm: Normal rate and regular rhythm. Pulmonary:      Effort: Pulmonary effort is normal. No respiratory distress. Breath sounds: Normal breath sounds. No wheezing or rales. Abdominal:      General: Bowel sounds are normal.      Palpations: Abdomen is soft. Musculoskeletal:      Right lower leg: No edema. Left lower leg: No edema. Skin:     General: Skin is warm. Neurological:      Mental Status: He is alert. Comments: Nonverbal primarily, says "vick", at baseline, oriented x 0   Psychiatric:         Cognition and Memory: Cognition is impaired. Additional Data:     Labs:    Results from last 7 days   Lab Units 10/26/20  0439   WBC Thousand/uL 6.63   HEMOGLOBIN g/dL 9.1*   HEMATOCRIT % 29.7*   PLATELETS Thousands/uL 290   NEUTROS PCT % 53   LYMPHS PCT % 33   MONOS PCT % 10   EOS PCT % 3     Results from last 7 days   Lab Units 10/26/20  0439   SODIUM mmol/L 135*   POTASSIUM mmol/L 3.9   CHLORIDE mmol/L 101   CO2 mmol/L 25   BUN mg/dL 15   CREATININE mg/dL 1.23   ANION GAP mmol/L 9   CALCIUM mg/dL 9.1   GLUCOSE RANDOM mg/dL 135         Results from last 7 days   Lab Units 10/26/20  0624 10/25/20  2007 10/25/20  1541 10/25/20  1408 10/25/20  1210 10/25/20  0856 10/24/20  2130 10/24/20  1617 10/24/20  1205 10/24/20  0814 10/24/20  0627 10/23/20  2104   POC GLUCOSE mg/dl 94 239* 168* 104 93 92 178* 207* 115 120 128 192*                   * I Have Reviewed All Lab Data Listed Above. * Additional Pertinent Lab Tests Reviewed:  All Labs Within Last 24 Hours Reviewed    Imaging:    Imaging Reports Reviewed Today Include:   Imaging Personally Reviewed by Myself Includes:      Recent Cultures (last 7 days):           Last 24 Hours Medication List:   Current Facility-Administered Medications   Medication Dose Route Frequency Provider Last Rate   • acetaminophen  650 mg Oral Q6H PRN Nolan Macario, DO     • amLODIPine  5 mg Oral Daily Janet Seen, DO     • atorvastatin  40 mg Oral Daily With [employer], DO     • carbidopa-levodopa  1 tablet Oral TID Nolan Macario, DO     • heparin (porcine)  5,000 Units Subcutaneous Q8H [facility] Young Medel, DO     • hydrALAZINE  5 mg Intravenous Q6H PRN Janet Seen, DO     • influenza vaccine  0.7 mL Intramuscular Once [employer], DO     • insulin glargine  10 Units Subcutaneous HS Mana Fisher, DO     • insulin lispro  1-6 Units Subcutaneous 4x Daily (AC & HS) Nolan Macario, DO     • levalbuterol  1.25 mg Nebulization Q6H PRN Nolan Macario, DO     • ondansetron  4 mg Intravenous Q6H PRN Nolan Macario, DO     • pantoprazole  40 mg Intravenous Q24H [facility] Verner Caller, PA-C     • senna-docusate sodium  1 tablet Oral BID Mana Fisher, DO     • traZODone  150 mg Oral HS Young Medel, DO     • vancomycin  750 mg Intravenous Q12H Yessenia Howard  mg (10/26/20 9345)        Today, Patient Was Seen By: Daphne Enrique PA-C    ** Please Note: Dictation voice to text software may have been used in the creation of this document.  **

## 2020-10-26 NOTE — ASSESSMENT & PLAN NOTE
Lab Results   Component Value Date    HGBA1C 7.7 (H) 10/19/2020     Results from last 7 days   Lab Units 10/26/20  0439   GLUCOSE RANDOM mg/dL 135     · Diabetes mellitus with hyperglycemia. · Continue Lantus 10 units q.h.s.   · Sliding scale insulin, Accu-Cheks  · Diabetic diet

## 2020-10-26 NOTE — ASSESSMENT & PLAN NOTE
In setting of CKD   Will need workup as outpatient with PCP   No evidence of active bleeding   Stable     Lab Results   Component Value Date    HGB 9.1 (L) 10/26/2020    HGB 9.6 (L) 10/20/2020    HGB 9.2 (L) 10/19/2020    HGB 8.7 (L) 10/18/2020    HGB 8.2 (L) 10/17/2020

## 2020-10-26 NOTE — PLAN OF CARE
Problem: Potential for Falls  Goal: Patient will remain free of falls  Description: INTERVENTIONS:  - Assess patient frequently for physical needs  -  Identify cognitive and physical deficits and behaviors that affect risk of falls.   -  [place] fall precautions as indicated by assessment.  - Educate patient/family on patient safety including physical limitations  - Instruct patient to call for assistance with activity based on assessment  - Modify environment to reduce risk of injury  - Consider OT/PT consult to assist with strengthening/mobility  Outcome: Progressing     Problem: Prexisting or High Potential for Compromised Skin Integrity  Goal: Skin integrity is maintained or improved  Description: INTERVENTIONS:  - Identify patients at risk for skin breakdown  - Assess and monitor skin integrity  - Assess and monitor nutrition and hydration status  - Monitor labs   - Assess for incontinence   - Turn and reposition patient  - Assist with mobility/ambulation  - Relieve pressure over bony prominences  - Avoid friction and shearing  - Provide appropriate hygiene as needed including keeping skin clean and dry  - Evaluate need for skin moisturizer/barrier cream  - Collaborate with interdisciplinary team   - Patient/family teaching  - Consider wound care consult   Outcome: Progressing     Problem: DISCHARGE PLANNING - CARE MANAGEMENT  Goal: Discharge to post-acute care or home with appropriate resources  Description: INTERVENTIONS:  - Conduct assessment to determine patient/family and health care team treatment goals, and need for post-acute services based on payer coverage, community resources, and patient preferences, and barriers to discharge  - Address psychosocial, clinical, and financial barriers to discharge as identified in assessment in conjunction with the patient/family and health care team  - Arrange appropriate level of post-acute services according to patient's   needs and preference and payer coverage in collaboration with the physician and health care team  - Communicate with and update the patient/family, physician, and health care team regarding progress on the discharge plan  - Arrange appropriate transportation to post-acute venues  Outcome: Progressing     Problem: INFECTION - ADULT  Goal: Absence or prevention of progression during hospitalization  Description: INTERVENTIONS:  - Assess and monitor for signs and symptoms of infection  - Monitor lab/diagnostic results  - Monitor all insertion sites, i.e. indwelling lines, tubes, and drains  - Monitor endotracheal if appropriate and nasal secretions for changes in amount and color  - [place] appropriate cooling/warming therapies per order  - Administer medications as ordered  - Instruct and encourage patient and family to use good hand hygiene technique  - Identify and instruct in appropriate isolation precautions for identified infection/condition  Outcome: Progressing  Goal: Absence of fever/infection during neutropenic period  Description: INTERVENTIONS:  - Monitor WBC    Outcome: Progressing     Problem: SAFETY ADULT  Goal: Patient will remain free of falls  Description: INTERVENTIONS:  - Assess patient frequently for physical needs  -  Identify cognitive and physical deficits and behaviors that affect risk of falls.   -  [place] fall precautions as indicated by assessment.  - Educate patient/family on patient safety including physical limitations  - Instruct patient to call for assistance with activity based on assessment  - Modify environment to reduce risk of injury  - Consider OT/PT consult to assist with strengthening/mobility  Outcome: Progressing  Goal: Maintain or return to baseline ADL function  Description: INTERVENTIONS:  -  Assess patient's ability to carry out ADLs; assess patient's baseline for ADL function and identify physical deficits which impact ability to perform ADLs (bathing, care of mouth/teeth, toileting, grooming, dressing, etc.)  - Assess/evaluate cause of self-care deficits   - Assess range of motion  - Assess patient's mobility; develop plan if impaired  - Assess patient's need for assistive devices and provide as appropriate  - Encourage maximum independence but intervene and supervise when necessary  - Involve family in performance of ADLs  - Assess for home care needs following discharge   - Consider OT consult to assist with ADL evaluation and planning for discharge  - Provide patient education as appropriate  Outcome: Progressing  Goal: Maintain or return mobility status to optimal level  Description: INTERVENTIONS:  - Assess patient's baseline mobility status (ambulation, transfers, stairs, etc.)    - Identify cognitive and physical deficits and behaviors that affect mobility  - Identify mobility aids required to assist with transfers and/or ambulation (gait belt, sit-to-stand, lift, walker, cane, etc.)  - [place] fall precautions as indicated by assessment  - Record patient progress and toleration of activity level on Mobility SBAR; progress patient to next Phase/Stage  - Instruct patient to call for assistance with activity based on assessment  - Consider rehabilitation consult to assist with strengthening/weightbearing, etc.  Outcome: Progressing     Problem: DISCHARGE PLANNING  Goal: Discharge to home or other facility with appropriate resources  Description: INTERVENTIONS:  - Identify barriers to discharge w/patient and caregiver  - Arrange for needed discharge resources and transportation as appropriate  - Identify discharge learning needs (meds, wound care, etc.)  - Arrange for interpretive services to assist at discharge as needed  - Refer to Case Management Department for coordinating discharge planning if the patient needs post-hospital services based on physician/advanced practitioner order or complex needs related to functional status, cognitive ability, or social support system  Outcome: Progressing     Problem: Knowledge Deficit  Goal: Patient/family/caregiver demonstrates understanding of disease process, treatment plan, medications, and discharge instructions  Description: Complete learning assessment and assess knowledge base. Interventions:  - Provide teaching at level of understanding  - Provide teaching via preferred learning methods  Outcome: Progressing     Problem: Nutrition/Hydration-ADULT  Goal: Nutrient/Hydration intake appropriate for improving, restoring or maintaining nutritional needs  Description: Monitor and assess patient's nutrition/hydration status for malnutrition. Collaborate with interdisciplinary team and initiate plan and interventions as ordered. Monitor patient's weight and dietary intake as ordered or per policy. Utilize nutrition screening tool and intervene as necessary. Determine patient's food preferences and provide high-protein, high-caloric foods as appropriate.      INTERVENTIONS:  - Monitor oral intake, urinary output, labs, and treatment plans  - Assess nutrition and hydration status and recommend course of action  - Evaluate amount of meals eaten  - Assist patient with eating if necessary   - Allow adequate time for meals  - Recommend/ encourage appropriate diets, oral nutritional supplements, and vitamin/mineral supplements  - Order, calculate, and assess calorie counts as needed  - Recommend, monitor, and adjust tube feedings and TPN/PPN based on assessed needs  - Assess need for intravenous fluids  - Provide specific nutrition/hydration education as appropriate  - Include patient/family/caregiver in decisions related to nutrition  Outcome: Progressing     Problem: GASTROINTESTINAL - ADULT  Goal: Maintains or returns to baseline bowel function  Description: INTERVENTIONS:  - Assess bowel function  - Encourage oral fluids to ensure adequate hydration  - Administer IV fluids if ordered to ensure adequate hydration  - Administer ordered medications as needed  - Encourage mobilization and activity  - Consider nutritional services referral to assist patient with adequate nutrition and appropriate food choices  Outcome: Progressing     Problem: SKIN/TISSUE INTEGRITY - ADULT  Goal: Skin integrity remains intact  Description: INTERVENTIONS  - Identify patients at risk for skin breakdown  - Assess and monitor skin integrity  - Assess and monitor nutrition and hydration status  - Monitor labs (i.e. albumin)  - Assess for incontinence   - Turn and reposition patient  - Assist with mobility/ambulation  - Relieve pressure over bony prominences  - Avoid friction and shearing  - Provide appropriate hygiene as needed including keeping skin clean and dry  - Evaluate need for skin moisturizer/barrier cream  - Collaborate with interdisciplinary team (i.e. Nutrition, Rehabilitation, etc.)   - Patient/family teaching  Outcome: Progressing     Problem: GENITOURINARY - ADULT  Goal: Urinary catheter remains patent  Description: INTERVENTIONS:  - Assess patency of condom catheter     Outcome: Progressing     Problem: CONFUSION/THOUGHT DISTURBANCE  Goal: Thought disturbances (confusion, delirium, depression, dementia or psychosis) are managed to maintain or return to baseline mental status and functional level  Description: INTERVENTIONS:  - Assess for possible contributors to  thought disturbance, including but not limited to medications, infection, impaired vision or hearing, underlying metabolic abnormalities, dehydration, respiratory compromise,  psychiatric diagnoses and notify attending PHYSICAN/AP  - Monitor and intervene to maintain adequate nutrition, hydration, elimination, sleep and activity  - Decrease environmental stimuli, including noise as appropriate. - Provide frequent contacts to provide refocusing, direction and reassurance as needed. Approach patient calmly with eye contact and at their level.   - [place] high risk fall precautions, aspiration precautions and other safety measures, as indicated  - If delirium suspected, notify physician/AP of change in condition and request immediate in-person evaluation  - Pursue consults as appropriate including Geriatric (campus dependent), OT for cognitive evaluation/activity planning, psychiatric, pastoral care, etc.  Outcome: Progressing

## 2020-10-26 NOTE — PROCEDURES
Insert PICC line    Date/Time: 10/26/2020 10:22 AM  Performed by: Gilles Porras RN  Authorized by: Robyn Borges PA-C     Patient location:  Bedside  Consent:     Consent obtained:  Written    Consent given by:  Guardian    Procedural risks discussed: consent obtained by physician. [place] protocol:     Procedure explained and questions answered to patient or proxy's satisfaction: yes      Relevant documents present and verified: yes      Test results available and properly labeled: yes      Radiology Images displayed and confirmed. If images not available, report reviewed: yes      Required blood products, implants, devices, and special equipment available: yes      Site/side marked: yes      Immediately prior to procedure, a time out was called: yes      Patient identity confirmed:  Verbally with patient, arm band, provided demographic data and hospital-assigned identification number  Pre-procedure details:     Hand hygiene: Hand hygiene performed prior to insertion      Sterile barrier technique: All elements of maximal sterile technique followed      Skin preparation:  ChloraPrep    Skin preparation agent: Skin preparation agent completely dried prior to procedure    Indications:     PICC line indications: long term antibiotics    Anesthesia (see MAR for exact dosages):      Anesthesia method:  Local infiltration (3ml)    Local anesthetic:  Lidocaine 1% w/o epi  Procedure details:     Location:  Brachial    Vessel type: vein      Laterality:  Right    Approach: percutaneous technique used      Patient position:  Flat    Procedural supplies:  Double lumen    Catheter size:  5 Fr    Landmarks identified: yes      Ultrasound guidance: yes      Sterile ultrasound techniques: Sterile gel and sterile probe covers were used      Number of attempts:  2    Successful placement: yes      Vessel of catheter tip end:  [place] 3CG confirmed (sherlock 3cg procedure record confirmed placement sent to medical records)    Total catheter length (cm):  38    Catheter out on skin (cm):  0    Max flow rate:  999ml/hr    Arm circumference:  30cm  Post-procedure details:     Post-procedure:  Dressing applied and securement device placed    Assessment:  Blood return through all ports and free fluid flow (sherlock 3cg confirmed placement)    Post-procedure complications: none      Patient tolerance of procedure:   Tolerated well, no immediate complications  Comments:      Lot# [id number] 2021-08-31  picc secured with kaylyn and spandex

## 2020-10-26 NOTE — CASE MANAGEMENT
Pt's vanco dosage to remain the same - family aware he will tent be  DC tomorrow after am dose. Family will transport. ONIEL will  see pt tomorrow for evening dose of antibiotic. MD aware.

## 2020-10-26 NOTE — ASSESSMENT & PLAN NOTE
1 out of 2 blood cultures positive on admission growing MRSA   Urine culture growing the same   Repeat blood cultures negative to date  S/p PICC placement on 10/22  unfortunately patient removed his PICC line 10/23 and Interventional Radiology was unable to replace PICC that day due to agitation and unsafe insertion   PICC was replaced 10/26     Echo: no vegetation/endocarditis   outpatient ID follow up   See above

## 2020-10-26 NOTE — ASSESSMENT & PLAN NOTE
· Sepsis POA thought to be secondary to pneumonia originally however original blood cultures on 10/15 growing MRSA and urine culture growing the same   · Infectious Disease consulted   · Continue on IV vancomycin for 4 weeks total through 11/14/20   · repeat blood cultures obtained on 10/18 negative to date   · S/p PICC placement on 10/22 unfortunately, patient removed his PICC 10/23 and Interventional Radiology was unable to replace PICC that day due to agitation therefore discharge was canceled  · PICC replaced today 10/26 successfully   · Please see my discussion with family on 10/25   · Weekly CBC with diff, BMP and vanc trough while on IV antibiotics  · Infectious Disease follow up outpatient   · PICC to be removed after last dose of IV abx   · 24/7 care at home provided by daughter-in-law and son and discharged with VNA services   · While in house provide with soft restraints as needed to maintain PICC and limit intervention in medical treatment

## 2020-10-26 NOTE — PROGRESS NOTES
Patient has been refusing blood draws and pulling his IV lines throughout treatment. New PICC line placed this am.  Trough drawn from PICC and ~ 2 hours after ideal trough time; result was 18.3 ng/mL. We will keep patient on 750 mg IV q12h at this time.

## 2020-10-26 NOTE — PROGRESS NOTES
Please notify Shad Pedersen at [facility] if there are any changes in his discharge.   [phone]

## 2020-10-26 NOTE — ASSESSMENT & PLAN NOTE
Lab Results   Component Value Date    HGBA1C 7.7 (H) 10/19/2020     Results from last 7 days   Lab Units 10/26/20  0439   GLUCOSE RANDOM mg/dL 135     · Continue oral meds upon discharge

## 2020-10-26 NOTE — UTILIZATION REVIEW
Continued Stay Review    Date:      For  10/25/20                          Current Patient Class:   Inpatient  Current Level of Care:   Med surg    HPI:70 y.o. male initially admitted on   10/15   With  Sepsis  Due to pneumonia/UTI     10/22  PICC inserted  10/23  Patient removed  His  PICC  IR  Unable to re insert thus far due to agitation  10/24  1: 1  observation       Assessment/Plan:   10/25   Plan  To have  PICC re inserted   10/26. Continue  1:1  Observation. Family  Adamantly  Refuses  Hospice care. Plan is to continue  MAJO/proceed with PICC re insertion  And home with 24 hr   Care.      Pertinent Labs/Diagnostic Results:       Results from last 7 days   Lab Units 10/26/20  0439 10/20/20  0516   WBC Thousand/uL 6.63 8.31   HEMOGLOBIN g/dL 9.1* 9.6*   HEMATOCRIT % 29.7* 31.2*   PLATELETS Thousands/uL 290 285   NEUTROS ABS Thousands/µL 3.54 5.32         Results from last 7 days   Lab Units 10/26/20  0439 10/24/20  0434 10/24/20  0335 10/21/20  0428 10/20/20  0516   SODIUM mmol/L 135* 134* 133* 137 136   POTASSIUM mmol/L 3.9 4.2 4.2 4.7 5.2   CHLORIDE mmol/L 101 100 100 104 103   CO2 mmol/L 25 21 22 24 25   ANION GAP mmol/L 9 13 11 9 8   BUN mg/dL 15 17 18 27* 18   CREATININE mg/dL 1.23 1.21 1.17 1.48* 1.18   EGFR ml/min/1.73sq m 59 60 62 47 62   CALCIUM mg/dL 9.1 8.9 8.8 9.1 9.1         Results from last 7 days   Lab Units 10/26/20  0624 10/25/20  2007 10/25/20  1541 10/25/20  1408 10/25/20  1210 10/25/20  0856 10/24/20  2130 10/24/20  1617 10/24/20  1205 10/24/20  0814 10/24/20  0627 10/23/20  2104   POC GLUCOSE mg/dl 94 239* 168* 104 93 92 178* 207* 115 120 128 192*     Results from last 7 days   Lab Units 10/26/20  0439 10/24/20  0434 10/24/20  0335 10/21/20  0428 10/20/20  0516   GLUCOSE RANDOM mg/dL 135 243* 247* 99 109                 Vital Signs:   10/26/20 0700   98.8 °F (37.1 °C)   66   18   136/66   99 %   None (Room air)   Lying    10/25/20 2256   97.9 °F (36.6 °C)   78   20   149/74   98 % None (Room air)   Lying    10/25/20 1500   97.4 °F (36.3 °C)Abnormal     71   18   154/64   71 %Abnormal     None (Room air)   Lying    10/24/20 2300   98.3 °F (36.8 °C)   73   18   156/73   97 %   --   Lying    10/24/20 1500   --   73   20   140/74   98 %   None (Room air)   Lying           Medications:   Scheduled Medications:  amLODIPine, 5 mg, Oral, Daily  atorvastatin, 40 mg, Oral, Daily With Dinner  carbidopa-levodopa, 1 tablet, Oral, TID  heparin (porcine), 5,000 Units, Subcutaneous, Q8H [address]  influenza vaccine, 0.7 mL, Intramuscular, Once  insulin glargine, 10 Units, Subcutaneous, HS  insulin lispro, 1-6 Units, Subcutaneous, 4x Daily (AC & HS)  pantoprazole, 40 mg, Intravenous, Q24H RAMSES  senna-docusate sodium, 1 tablet, Oral, BID  traZODone, 150 mg, Oral, HS  vancomycin, 750 mg, Intravenous, Q12H      Continuous IV Infusions:     PRN Meds:  acetaminophen, 650 mg, Oral, Q6H PRN  hydrALAZINE, 5 mg, Intravenous, Q6H PRN  levalbuterol, 1.25 mg, Nebulization, Q6H PRN  ondansetron, 4 mg, Intravenous, Q6H PRN        Discharge Plan:    D    Network Utilization Review Department  [email or web address]  ATTENTION: Please call with any questions or concerns to [phone] and carefully listen to the prompts so that you are directed to the right person. All voicemails are confidential.  Maryan Gitelman all requests for admission clinical reviews, approved or denied determinations and any other requests to dedicated fax number below belonging to the campus where the patient is receiving treatment.  List of dedicated fax numbers for the Facilities:  FACILITY NAME UR FAX NUMBER   ADMISSION DENIALS (Administrative/Medical Necessity) [phone] [address] [facility] (Maternity/NICU/Pediatrics) [phone]   [address] [facility] [phone]   [place] [facility] [phone]   [facility] AND CARDIOVASCULAR [facility] HEART [place] [address] [phone]

## 2020-10-26 NOTE — ASSESSMENT & PLAN NOTE
Lab Results   Component Value Date    EGFR 59 10/26/2020    EGFR 60 10/24/2020    EGFR 62 10/24/2020    CREATININE 1.23 10/26/2020    CREATININE 1.21 10/24/2020    CREATININE 1.17 10/24/2020     DREW resolved   Renal function baseline appears to be around 1.4   At baseline   outpatient follow up with Nephrology

## 2020-10-26 NOTE — DISCHARGE SUMMARY
Discharge- Darrick Van Cristian 1950, 69 y.o. male MRN: [id number]  Unit/Bed#: E2 -01 Encounter: [id number]  Primary Care Provider: Laura Davis MD   Date and time admitted to hospital: 10/15/2020  8:46 PM    * Sepsis [facility])  Assessment & Plan  · Sepsis POA thought to be secondary to pneumonia originally however original blood cultures on 10/15 growing MRSA and urine culture growing the same   · Infectious Disease consulted   · Continue on IV vancomycin for 4 weeks total through 11/14/20   · repeat blood cultures obtained on 10/18 negative to date   · S/p PICC placement on 10/22 unfortunately, patient removed his PICC 10/23 and Interventional Radiology was unable to replace PICC that day due to agitation therefore discharge was canceled  · PICC replaced today 10/26 successfully   · Please see my discussion with family on 10/25   · Weekly CBC with diff, BMP and vanc trough while on IV antibiotics  · Infectious Disease follow up outpatient   · PICC to be removed after last dose of IV abx   · 24/7 care at home provided by daughter-in-law and son and discharged with VNA services   · While in house provide with soft restraints as needed to maintain PICC and limit intervention in medical treatment           MRSA bacteremia  Assessment & Plan  1 out of 2 blood cultures positive on admission growing MRSA   Urine culture growing the same   Repeat blood cultures negative to date  S/p PICC placement on 10/22  unfortunately patient removed his PICC line 10/23 and Interventional Radiology was unable to replace PICC that day due to agitation and unsafe insertion   PICC was replaced 10/26   continue IV abx with vancomycin through 11/14, PICC will need to be removed after last dose of abx   Echo: no vegetation/endocarditis   outpatient ID follow up   See above       UTI (urinary tract infection)  Assessment & Plan  MRSA UTI   Continue IV vancomycin    See plan above     Hyperlipidemia  Assessment & Plan  · Hyperlipidemia.     · Continue statin     Hypertension  Assessment & Plan  · Lisinopril held on admission given DREW  · Nephrology recommending NOT restarting ACE-I on discharge   · Started on amlodipine, increased to 5 mg once daily with improvement in BP   · outpatient follow up with PCP     Parkinson disease ([address])  Assessment & Plan  · Parkinson's dementia not much verbal at baseline and essentially bed/wheelchair bound  · Continue sinemet 25/100 t.i.d.  · speech eval - level 2 with thin liquids   · Spoke with patient's daughter-in-law at length using blue phone, patient lives with her and son and is non-verbal at baseline,  He is non ambulatory and they carry him to a recliner, she states he is at his baseline and  Has been this way since 2009, she states he always eats slowly takes about an hour to eat  · See my family meeting from 10/25 - Patient is a high risk for readmission due to concerns for pulling out his PICC, son and daughter-n-law state they will provide 24/7 supervision and are aware of the importance of PICC remaining in place until last dose IV abx on 11/14  · Home VNA services    Diabetes mellitus [facility])  Assessment & Plan  Lab Results   Component Value Date    HGBA1C 7.7 (H) 10/19/2020     Results from last 7 days   Lab Units 10/26/20  0439   GLUCOSE RANDOM mg/dL 135     · Continue oral meds upon discharge     GERD (gastroesophageal reflux disease)  Assessment & Plan  · GERD continue PPI    Anemia  Assessment & Plan  In setting of CKD   Will need workup as outpatient with PCP   No evidence of active bleeding   Stable     Lab Results   Component Value Date    HGB 9.1 (L) 10/26/2020    HGB 9.6 (L) 10/20/2020    HGB 9.2 (L) 10/19/2020    HGB 8.7 (L) 10/18/2020    HGB 8.2 (L) 10/17/2020         CKD (chronic kidney disease) stage 3, GFR 30-59 ml/min  Assessment & Plan  Lab Results   Component Value Date    EGFR 59 10/26/2020    EGFR 60 10/24/2020    EGFR 62 10/24/2020    CREATININE 1.23 10/26/2020    CREATININE 1.21 10/24/2020    CREATININE 1.17 10/24/2020     DREW resolved   Renal function baseline appears to be around 1.4   At baseline   outpatient follow up with Nephrology         Discharging Physician / Practitioner: Melina Wilson PA-C  PCP: Alexander Peñaloza MD  Admission Date:   Admission Orders (From admission, onward)     Ordered        10/15/20 2325  Inpatient Admission  Once                   Discharge Date: 10/27/20    Resolved Problems  Date Reviewed: 10/26/2020          Resolved    Constipation 10/19/2020     Resolved by  Melina Wilson PA-C    DREW (acute kidney injury) ([address]) 10/19/2020     Resolved by  Melina Wilson PA-C    Hypernatremia 10/19/2020     Resolved by  Melina Wilson PA-C          Consultations During Hospital Stay:  · Infectious Disease   · Wound care   · Nephrology     Procedures Performed:   · S/p repeat PICC placement on 10/26   · S/p initial PICC placement on 10/22 - patient removed himself on 10/23      Significant Findings / Test Results:   · Echocardiogram:  SUMMARY:  1. Technically difficult study  2. Left ventricle is normal in size with mildly reduced systolic function. Left ventricular ejection fraction is estimated at 50%  3. Grade 1 diastolic dysfunction  4. Basal anterolateral and inferolateral hypokinesis  5. Mild mitral regurgitation  6. Trace tricuspid regurgitation  7. Pulmonary artery systolic pressures cannot be estimated due to suboptimal tricuspid regurgitation envelope  8. There are no obvious echocardiographic indications of vegetation/endocarditis  9. There is no study for comparison       · Chest x-ray:  No acute cardiopulmonary disease  · CT abdomen and pelvis:  FINDINGS:     ABDOMEN     LOWER CHEST:  Clear lung bases.     LIVER/BILIARY TREE:  No visualized hepatic lesion on noncontrast images.  No biliary obstruction.     GALLBLADDER:  No calcified gallstones.  No pericholecystic inflammatory change.     SPLEEN:  Unremarkable.     PANCREAS:  Unremarkable.     ADRENAL GLANDS:  Unremarkable.     KIDNEYS/URETERS:  Extensive renal vascular calcifications.  No evidence of nephrolithiasis or obstructive uropathy.     STOMACH AND BOWEL:  Stool markedly distending the rectum.  Large amount of stool in the right colon and sigmoid colon.  No evidence of bowel obstruction, colitis or diverticulitis.     APPENDIX:  Normal appendix.     ABDOMINOPELVIC CAVITY:  No ascites.  No pneumoperitoneum.  No lymphadenopathy.     VESSELS:  Diffuse calcified plaque throughout the aortoiliac arteries.     PELVIS     REPRODUCTIVE ORGANS:  No prostate enlargement.     URINARY BLADDER:  Unremarkable.     ABDOMINAL WALL/INGUINAL REGIONS:  Unremarkable.     OSSEOUS STRUCTURES:  No acute fracture or destructive osseous lesion.     IMPRESSION:     Findings consistent with rectal impaction and constipation.  No evidence of bowel obstruction, colitis or diverticulitis.       Incidental Findings:   · none    Test Results Pending at Discharge (will require follow up): · Weekly CBC, BMP and vancomycin trough while on IV antibiotics      Outpatient Tests Requested:  · Infectious Disease   · Nephrology   · PCP in 1 week for post hospital follow up     Complications:  Parkinson's dementia, DREW- resolved, hypernatremia- resolved    Reason for Admission: sepsis, MRSA bacteremia, UTI     Hospital Course:     Bobby Castellanos is a 69 y.o. male patient who originally presented to the hospital on 10/15/2020 due to  worsening debility and hypoxia. Patient met sepsis criteria on admission originally thought to be secondary to pneumonia. However, his blood cultures on admission 1/2 sets from going MRSA with urine culture growing the same. Consider hematogenous spread. His IV antibiotics started on admission were switched to IV vancomycin due to MRSA bacteremia. He was seen in consultation with Infectious Disease.   Patient had an echocardiogram which revealed no obvious indications or evidence of vegetation/endocarditis. Patient will continue IV vancomycin for 4 weeks total through 11/14/2020. Patient had PICC line inserted on 10/22/2020 for long-term IV antibiotics upon discharge. Patient had planned discharge on Friday 10/23 however he removed his PICC prior to discharge. His PICC was replaced on Monday 10/26/20 successfully. His PICC line will need to be removed after last dose of IV antibiotics. He will need continue weekly CBC with differential, BMP and vancomycin trough while on IV antibiotics. He will need close follow up Infectious Disease outpatient. He will need surveillance blood cultures 2 weeks after completion of antibiotics. Patient will be discharged home with VNA services per family request and for ongoing management of long-term IV antibiotics. They refused rehab and hospice. Patient will be high risk for readmission due to concerns for PICC removal at home secondary to his dementia and intermittent agitation. I had multiple family meetings regarding this and son states between him and daughter-in-law they will provide 24/7 care to ensure this does not happen. VNA to come to their house this evening for evening dose of vancomycin.      Patient was seen in consultation with Nephrology for acute kidney injury present on admission likely etiology was previously normal azotemia with a component of ATN well on ACE-inhibitor. Patient's lisinopril was discontinued indefinitely at this time. He was started on amlodipine 5 mg once daily for better blood pressure control. His renal function baseline appears to be around 1.4 and was stable prior to discharge. He will need ongoing follow-up with Nephrology outpatient.      Patient CT scan abdomen and pelvis on admission revealing rectal impaction constipation. Patient has had multiple bowel movements while hospitalized. He should continue bowel regimen at home to prevent constipation in the future.   He had a bowel movement today prior to discharge.     Patient was seen by speech therapy. He should continue level 2 mechanical soft diet with thin liquids upon discharge.     Of note, patient has Parkinson's dementia and her family who he lives with his nonverbal at baseline. Patient is nonambulatory at baseline and may carry him to a recliner at home. Family states he is at baseline prior to discharge. They state he has been this way since 2009.      Please see above list of diagnoses and related plan for additional information. Please see full hospital course for details. Condition at Discharge: stable     Discharge Day Visit / Exam:     Subjective:  No acute complaints or events overnight. Limited ROS due to dementia. Vitals: Blood Pressure: 136/66 (10/26/20 0700)  Pulse: 66 (10/26/20 0700)  Temperature: 98.8 °F (37.1 °C) (10/26/20 0700)  Temp Source: Temporal (10/26/20 0700)  Respirations: 18 (10/26/20 0700)  Weight - Scale: 62.7 kg (138 lb 3.7 oz) (10/26/20 0534)  SpO2: 99 % (10/26/20 0700)  Exam:   Physical Exam  Vitals signs and nursing note reviewed. Constitutional:       General: He is not in acute distress. Comments: Chronically ill appearing    HENT:      Head: Normocephalic. Eyes:      Conjunctiva/sclera: Conjunctivae normal.   Cardiovascular:      Rate and Rhythm: Normal rate and regular rhythm. Pulmonary:      Effort: Pulmonary effort is normal. No respiratory distress. Breath sounds: Normal breath sounds. No wheezing or rales. Abdominal:      General: Bowel sounds are normal.      Palpations: Abdomen is soft. Musculoskeletal:      Comments: Sleeps with legs bent curled in a ball    Skin:     General: Skin is warm. Comments: PICC  In place right arm    Neurological:      Mental Status: He is alert. Comments: Sleeping but opens eyes to to "mariano", nonverbal   Psychiatric:         Cognition and Memory: Cognition is impaired.        Discharge instructions/Information to patient and family:   See after visit summary for information provided to patient and family. Provisions for Follow-Up Care:  See after visit summary for information related to follow-up care and any pertinent home health orders. Disposition:     Home with VNA Services (Reminder: Complete face to face encounter)    For Discharges to [facility] SNF:   · Not Applicable to this Patient - Not Applicable to this Patient    Planned Readmission: high risk for readmission      Discharge Statement:  I spent 35 minutes discharging the patient. This time was spent on the day of discharge. I had direct contact with the patient on the day of discharge. Greater than 50% of the total time was spent examining patient, answering all patient questions, arranging and discussing plan of care with patient as well as directly providing post-discharge instructions. Additional time then spent on discharge activities. Discharge Medications:  See after visit summary for reconciled discharge medications provided to patient and family.       ** Please Note: This note has been constructed using a voice recognition system **

## 2020-10-27 VITALS
RESPIRATION RATE: 18 BRPM | WEIGHT: 138.23 LBS | TEMPERATURE: 97.5 F | OXYGEN SATURATION: 93 % | HEART RATE: 83 BPM | DIASTOLIC BLOOD PRESSURE: 86 MMHG | SYSTOLIC BLOOD PRESSURE: 119 MMHG | BODY MASS INDEX: 21.65 KG/M2

## 2020-10-27 DIAGNOSIS — B95.62 MRSA BACTEREMIA: Primary | ICD-10-CM

## 2020-10-27 DIAGNOSIS — R78.81 MRSA BACTEREMIA: Primary | ICD-10-CM

## 2020-10-27 LAB
GLUCOSE SERPL-MCNC: 178 MG/DL (ref 65–140)
GLUCOSE SERPL-MCNC: 240 MG/DL (ref 65–140)
GLUCOSE SERPL-MCNC: 99 MG/DL (ref 65–140)

## 2020-10-27 PROCEDURE — 99239 HOSP IP/OBS DSCHRG MGMT >30: CPT | Performed by: PHYSICIAN ASSISTANT

## 2020-10-27 PROCEDURE — 99233 SBSQ HOSP IP/OBS HIGH 50: CPT | Performed by: INTERNAL MEDICINE

## 2020-10-27 PROCEDURE — C9113 INJ PANTOPRAZOLE SODIUM, VIA: HCPCS | Performed by: PHYSICIAN ASSISTANT

## 2020-10-27 PROCEDURE — 82948 REAGENT STRIP/BLOOD GLUCOSE: CPT

## 2020-10-27 RX ORDER — LORAZEPAM 0.5 MG/1
0.5 TABLET ORAL EVERY 8 HOURS PRN
Qty: 30 TABLET | Refills: 0 | Status: SHIPPED | OUTPATIENT
Start: 2020-10-27 | End: 2020-11-15

## 2020-10-27 RX ADMIN — CARBIDOPA AND LEVODOPA 1 TABLET: 25; 100 TABLET ORAL at 18:07

## 2020-10-27 RX ADMIN — TRAZODONE HYDROCHLORIDE 150 MG: 100 TABLET ORAL at 00:52

## 2020-10-27 RX ADMIN — AMLODIPINE BESYLATE 5 MG: 5 TABLET ORAL at 10:23

## 2020-10-27 RX ADMIN — INSULIN LISPRO 1 UNITS: 100 INJECTION, SOLUTION INTRAVENOUS; SUBCUTANEOUS at 18:06

## 2020-10-27 RX ADMIN — VANCOMYCIN HYDROCHLORIDE 750 MG: 750 INJECTION, SOLUTION INTRAVENOUS at 01:13

## 2020-10-27 RX ADMIN — INSULIN LISPRO 3 UNITS: 100 INJECTION, SOLUTION INTRAVENOUS; SUBCUTANEOUS at 10:46

## 2020-10-27 RX ADMIN — VANCOMYCIN HYDROCHLORIDE 750 MG: 750 INJECTION, SOLUTION INTRAVENOUS at 12:21

## 2020-10-27 RX ADMIN — INSULIN LISPRO 2 UNITS: 100 INJECTION, SOLUTION INTRAVENOUS; SUBCUTANEOUS at 00:59

## 2020-10-27 RX ADMIN — INSULIN GLARGINE 10 UNITS: 100 INJECTION, SOLUTION SUBCUTANEOUS at 00:57

## 2020-10-27 RX ADMIN — CARBIDOPA AND LEVODOPA 1 TABLET: 25; 100 TABLET ORAL at 10:23

## 2020-10-27 RX ADMIN — DOCUSATE SODIUM AND SENNOSIDES 1 TABLET: 8.6; 5 TABLET, FILM COATED ORAL at 10:22

## 2020-10-27 RX ADMIN — HEPARIN SODIUM 5000 UNITS: 5000 INJECTION INTRAVENOUS; SUBCUTANEOUS at 00:59

## 2020-10-27 RX ADMIN — ATORVASTATIN CALCIUM 40 MG: 40 TABLET, FILM COATED ORAL at 18:07

## 2020-10-27 RX ADMIN — DOCUSATE SODIUM AND SENNOSIDES 1 TABLET: 8.6; 5 TABLET, FILM COATED ORAL at 18:07

## 2020-10-27 RX ADMIN — HEPARIN SODIUM 5000 UNITS: 5000 INJECTION INTRAVENOUS; SUBCUTANEOUS at 06:34

## 2020-10-27 RX ADMIN — PANTOPRAZOLE SODIUM 40 MG: 40 INJECTION, POWDER, FOR SOLUTION INTRAVENOUS at 06:26

## 2020-10-27 RX ADMIN — HEPARIN SODIUM 5000 UNITS: 5000 INJECTION INTRAVENOUS; SUBCUTANEOUS at 15:08

## 2020-10-27 RX ADMIN — CARBIDOPA AND LEVODOPA 1 TABLET: 25; 100 TABLET ORAL at 00:52

## 2020-10-27 NOTE — PROGRESS NOTES
Progress Note - Infectious Disease   Victor Manuel Lopesorel 69 y.o. male MRN: [id number]  Unit/Bed#: E2 -01 Encounter: [id number]    Impression/Plan:  1. Sepsis. POA.  Fever and leukocytosis.  Was initially felt to be secondary to an aspiration event with possible pneumonia, however blood cultures show MRSA bacteremia. Fortunately the patient has clinically improved.  Fever curve and WBC count have trended down. His procalcitonin level normalized.  Repeat blood cultures were negative after 5 days.  -antibiotic as below  -monitor CBC and BMP  -monitor vitals  -supportive care     2. MRSA bacteremia. No definitive etiology although the patient's urine culture is also positive for MRSA.  As the patient does not have chronic indwelling Arevalo catheter in place, must consider the possibility of more of a disseminated process that went to the urine. TTE was negative.  Repeat blood cultures were negative after 5 days. The patient is currently receiving IV vancomycin and appears to be tolerating antibiotic treatment without difficulty. New PICC has been placed and VNA will begin home antibiotics this afternoon.  -continue IV vancomycin through 11/14/2020 for a total of 4 weeks of IV antibiotic treatment  -continue pharmacy consult for guidance on vancomycin dosage  -weekly CBC and BMP  -monitor vitals  -PICC will need to be removed after final dose of IV antibiotics on 11/14/2020  -patient will require outpatient ID follow up on 11/16/2020 at 10am with Dr. Cleopatra Brizuela  -scripts for antibiotics and lab work were placed in patient chart on 10/21/2020     3. Possible MRSA UTI. More likely that this was a hematogenous process as the patient does not have chronic arevalo catheter in place. No complication seen on CT the abdomen pelvis.  -antibiotics as above  -monitor  symptoms  -monitor urine output  -no additional ID workup for now     4. Acute kidney injury. On chronic kidney disease.  Likely a pre renal issue.  Perhaps medications were playing a role.  Perhaps ATN was playing a role.  The renal function has improved with resuscitation.   -monitor BMP  -dose adjust antibiotics for renal function as needed  -continue follow-up with Nephrology     5. Type 2 diabetes mellitus with hyperglycemia without long-term insulin use.  Patient's last hemoglobin A1c was 7.7% on 10/19/2020.  Elevated blood glucose is risk factor for infection.  Recommend tighter glycemic control for overall health, especially in the setting of infection.  -blood glucose management per primary service     6. Dementia.  Associated with Alzheimer's disease and Parkinson's disease.  -supportive care    Patient remains stable for discharge from ID standpoint. Above plan was discussed in detail with Jocelin MULLIGAN PA-C [place]. Antibiotics:  Vancomycin 10  Antibiotics 13    Subjective:  Unable to obtain ROS as patient is minimally verbal with dementia. Objective:  Vitals:  Temp:  [97.6 °F (36.4 °C)-98 °F (36.7 °C)] 97.8 °F (36.6 °C)  HR:  [56-78] 56  Resp:  [18-20] 18  BP: (114-170)/(56-66) 114/56  SpO2:  [96 %-98 %] 96 %  Temp (24hrs), Av.8 °F (36.6 °C), Min:97.6 °F (36.4 °C), Max:98 °F (36.7 °C)  Current: Temperature: 97.8 °F (36.6 °C)    Physical Exam:   General Appearance:  Awake, minimally interactive with dementia but he is cooperative, nontoxic, in no acute distress. He appears comfortable sitting up in bed. Throat: Oropharynx moist without lesions. Lungs:   Clear to auscultation bilaterally; no wheezes, rhonchi or rales; respirations unlabored; patient is on room air   Heart:  Bradycardic; no murmur, rub or gallop   Abdomen:   Soft, no facial grimace with palpation, non-distended, positive bowel sounds. Extremities: No clubbing or cyanosis, no edema; RUE PICC intact, no spreading erythema from site, dressing is clean/dry   Skin: No new rashes  noted on exposed skin.      Labs, Imaging, & Other studies:   All pertinent labs and imaging studies were personally reviewed  Results from last 7 days   Lab Units 10/26/20  0439   WBC Thousand/uL 6.63   HEMOGLOBIN g/dL 9.1*   PLATELETS Thousands/uL 290     Results from last 7 days   Lab Units 10/26/20  0439   POTASSIUM mmol/L 3.9   CHLORIDE mmol/L 101   CO2 mmol/L 25   BUN mg/dL 15   CREATININE mg/dL 1.23   EGFR ml/min/1.73sq m 59   CALCIUM mg/dL 9.1

## 2020-10-27 NOTE — PLAN OF CARE
Problem: Potential for Falls  Goal: Patient will remain free of falls  Description: INTERVENTIONS:  - Assess patient frequently for physical needs  -  Identify cognitive and physical deficits and behaviors that affect risk of falls.   -  [place] fall precautions as indicated by assessment.  - Educate patient/family on patient safety including physical limitations  - Instruct patient to call for assistance with activity based on assessment  - Modify environment to reduce risk of injury  - Consider OT/PT consult to assist with strengthening/mobility  Outcome: Progressing     Problem: Prexisting or High Potential for Compromised Skin Integrity  Goal: Skin integrity is maintained or improved  Description: INTERVENTIONS:  - Identify patients at risk for skin breakdown  - Assess and monitor skin integrity  - Assess and monitor nutrition and hydration status  - Monitor labs   - Assess for incontinence   - Turn and reposition patient  - Assist with mobility/ambulation  - Relieve pressure over bony prominences  - Avoid friction and shearing  - Provide appropriate hygiene as needed including keeping skin clean and dry  - Evaluate need for skin moisturizer/barrier cream  - Collaborate with interdisciplinary team   - Patient/family teaching  - Consider wound care consult   Outcome: Progressing     Problem: DISCHARGE PLANNING - CARE MANAGEMENT  Goal: Discharge to post-acute care or home with appropriate resources  Description: INTERVENTIONS:  - Conduct assessment to determine patient/family and health care team treatment goals, and need for post-acute services based on payer coverage, community resources, and patient preferences, and barriers to discharge  - Address psychosocial, clinical, and financial barriers to discharge as identified in assessment in conjunction with the patient/family and health care team  - Arrange appropriate level of post-acute services according to patient's   needs and preference and payer coverage in collaboration with the physician and health care team  - Communicate with and update the patient/family, physician, and health care team regarding progress on the discharge plan  - Arrange appropriate transportation to post-acute venues  Outcome: Progressing     Problem: INFECTION - ADULT  Goal: Absence or prevention of progression during hospitalization  Description: INTERVENTIONS:  - Assess and monitor for signs and symptoms of infection  - Monitor lab/diagnostic results  - Monitor all insertion sites, i.e. indwelling lines, tubes, and drains  - Monitor endotracheal if appropriate and nasal secretions for changes in amount and color  - [place] appropriate cooling/warming therapies per order  - Administer medications as ordered  - Instruct and encourage patient and family to use good hand hygiene technique  - Identify and instruct in appropriate isolation precautions for identified infection/condition  Outcome: Progressing  Goal: Absence of fever/infection during neutropenic period  Description: INTERVENTIONS:  - Monitor WBC    Outcome: Progressing     Problem: SAFETY ADULT  Goal: Patient will remain free of falls  Description: INTERVENTIONS:  - Assess patient frequently for physical needs  -  Identify cognitive and physical deficits and behaviors that affect risk of falls.   -  [place] fall precautions as indicated by assessment.  - Educate patient/family on patient safety including physical limitations  - Instruct patient to call for assistance with activity based on assessment  - Modify environment to reduce risk of injury  - Consider OT/PT consult to assist with strengthening/mobility  Outcome: Progressing  Goal: Maintain or return to baseline ADL function  Description: INTERVENTIONS:  -  Assess patient's ability to carry out ADLs; assess patient's baseline for ADL function and identify physical deficits which impact ability to perform ADLs (bathing, care of mouth/teeth, toileting, grooming, dressing, etc.)  - Assess/evaluate cause of self-care deficits   - Assess range of motion  - Assess patient's mobility; develop plan if impaired  - Assess patient's need for assistive devices and provide as appropriate  - Encourage maximum independence but intervene and supervise when necessary  - Involve family in performance of ADLs  - Assess for home care needs following discharge   - Consider OT consult to assist with ADL evaluation and planning for discharge  - Provide patient education as appropriate  Outcome: Progressing  Goal: Maintain or return mobility status to optimal level  Description: INTERVENTIONS:  - Assess patient's baseline mobility status (ambulation, transfers, stairs, etc.)    - Identify cognitive and physical deficits and behaviors that affect mobility  - Identify mobility aids required to assist with transfers and/or ambulation (gait belt, sit-to-stand, lift, walker, cane, etc.)  - [place] fall precautions as indicated by assessment  - Record patient progress and toleration of activity level on Mobility SBAR; progress patient to next Phase/Stage  - Instruct patient to call for assistance with activity based on assessment  - Consider rehabilitation consult to assist with strengthening/weightbearing, etc.  Outcome: Progressing     Problem: DISCHARGE PLANNING  Goal: Discharge to home or other facility with appropriate resources  Description: INTERVENTIONS:  - Identify barriers to discharge w/patient and caregiver  - Arrange for needed discharge resources and transportation as appropriate  - Identify discharge learning needs (meds, wound care, etc.)  - Arrange for interpretive services to assist at discharge as needed  - Refer to Case Management Department for coordinating discharge planning if the patient needs post-hospital services based on physician/advanced practitioner order or complex needs related to functional status, cognitive ability, or social support system  Outcome: Progressing     Problem: Knowledge Deficit  Goal: Patient/family/caregiver demonstrates understanding of disease process, treatment plan, medications, and discharge instructions  Description: Complete learning assessment and assess knowledge base. Interventions:  - Provide teaching at level of understanding  - Provide teaching via preferred learning methods  Outcome: Progressing     Problem: Nutrition/Hydration-ADULT  Goal: Nutrient/Hydration intake appropriate for improving, restoring or maintaining nutritional needs  Description: Monitor and assess patient's nutrition/hydration status for malnutrition. Collaborate with interdisciplinary team and initiate plan and interventions as ordered. Monitor patient's weight and dietary intake as ordered or per policy. Utilize nutrition screening tool and intervene as necessary. Determine patient's food preferences and provide high-protein, high-caloric foods as appropriate.      INTERVENTIONS:  - Monitor oral intake, urinary output, labs, and treatment plans  - Assess nutrition and hydration status and recommend course of action  - Evaluate amount of meals eaten  - Assist patient with eating if necessary   - Allow adequate time for meals  - Recommend/ encourage appropriate diets, oral nutritional supplements, and vitamin/mineral supplements  - Order, calculate, and assess calorie counts as needed  - Recommend, monitor, and adjust tube feedings and TPN/PPN based on assessed needs  - Assess need for intravenous fluids  - Provide specific nutrition/hydration education as appropriate  - Include patient/family/caregiver in decisions related to nutrition  Outcome: Progressing     Problem: GASTROINTESTINAL - ADULT  Goal: Maintains or returns to baseline bowel function  Description: INTERVENTIONS:  - Assess bowel function  - Encourage oral fluids to ensure adequate hydration  - Administer IV fluids if ordered to ensure adequate hydration  - Administer ordered medications as needed  - Encourage mobilization and activity  - Consider nutritional services referral to assist patient with adequate nutrition and appropriate food choices  Outcome: Progressing     Problem: SKIN/TISSUE INTEGRITY - ADULT  Goal: Skin integrity remains intact  Description: INTERVENTIONS  - Identify patients at risk for skin breakdown  - Assess and monitor skin integrity  - Assess and monitor nutrition and hydration status  - Monitor labs (i.e. albumin)  - Assess for incontinence   - Turn and reposition patient  - Assist with mobility/ambulation  - Relieve pressure over bony prominences  - Avoid friction and shearing  - Provide appropriate hygiene as needed including keeping skin clean and dry  - Evaluate need for skin moisturizer/barrier cream  - Collaborate with interdisciplinary team (i.e. Nutrition, Rehabilitation, etc.)   - Patient/family teaching  Outcome: Progressing     Problem: GENITOURINARY - ADULT  Goal: Urinary catheter remains patent  Description: INTERVENTIONS:  - Assess patency of condom catheter     Outcome: Progressing     Problem: CONFUSION/THOUGHT DISTURBANCE  Goal: Thought disturbances (confusion, delirium, depression, dementia or psychosis) are managed to maintain or return to baseline mental status and functional level  Description: INTERVENTIONS:  - Assess for possible contributors to  thought disturbance, including but not limited to medications, infection, impaired vision or hearing, underlying metabolic abnormalities, dehydration, respiratory compromise,  psychiatric diagnoses and notify attending PHYSICAN/AP  - Monitor and intervene to maintain adequate nutrition, hydration, elimination, sleep and activity  - Decrease environmental stimuli, including noise as appropriate. - Provide frequent contacts to provide refocusing, direction and reassurance as needed. Approach patient calmly with eye contact and at their level.   - [place] high risk fall precautions, aspiration precautions and other safety measures, as indicated  - If delirium suspected, notify physician/AP of change in condition and request immediate in-person evaluation  - Pursue consults as appropriate including Geriatric (campus dependent), OT for cognitive evaluation/activity planning, psychiatric, pastoral care, etc.  Outcome: Progressing

## 2020-10-27 NOTE — PLAN OF CARE
Problem: Potential for Falls  Goal: Patient will remain free of falls  Description: INTERVENTIONS:  - Assess patient frequently for physical needs  -  Identify cognitive and physical deficits and behaviors that affect risk of falls.   -  [place] fall precautions as indicated by assessment.  - Educate patient/family on patient safety including physical limitations  - Instruct patient to call for assistance with activity based on assessment  - Modify environment to reduce risk of injury  - Consider OT/PT consult to assist with strengthening/mobility  Outcome: Progressing     Problem: Prexisting or High Potential for Compromised Skin Integrity  Goal: Skin integrity is maintained or improved  Description: INTERVENTIONS:  - Identify patients at risk for skin breakdown  - Assess and monitor skin integrity  - Assess and monitor nutrition and hydration status  - Monitor labs   - Assess for incontinence   - Turn and reposition patient  - Assist with mobility/ambulation  - Relieve pressure over bony prominences  - Avoid friction and shearing  - Provide appropriate hygiene as needed including keeping skin clean and dry  - Evaluate need for skin moisturizer/barrier cream  - Collaborate with interdisciplinary team   - Patient/family teaching  - Consider wound care consult   Outcome: Progressing     Problem: DISCHARGE PLANNING - CARE MANAGEMENT  Goal: Discharge to post-acute care or home with appropriate resources  Description: INTERVENTIONS:  - Conduct assessment to determine patient/family and health care team treatment goals, and need for post-acute services based on payer coverage, community resources, and patient preferences, and barriers to discharge  - Address psychosocial, clinical, and financial barriers to discharge as identified in assessment in conjunction with the patient/family and health care team  - Arrange appropriate level of post-acute services according to patient's   needs and preference and payer coverage in collaboration with the physician and health care team  - Communicate with and update the patient/family, physician, and health care team regarding progress on the discharge plan  - Arrange appropriate transportation to post-acute venues  Outcome: Progressing     Problem: INFECTION - ADULT  Goal: Absence or prevention of progression during hospitalization  Description: INTERVENTIONS:  - Assess and monitor for signs and symptoms of infection  - Monitor lab/diagnostic results  - Monitor all insertion sites, i.e. indwelling lines, tubes, and drains  - Monitor endotracheal if appropriate and nasal secretions for changes in amount and color  - [place] appropriate cooling/warming therapies per order  - Administer medications as ordered  - Instruct and encourage patient and family to use good hand hygiene technique  - Identify and instruct in appropriate isolation precautions for identified infection/condition  Outcome: Progressing  Goal: Absence of fever/infection during neutropenic period  Description: INTERVENTIONS:  - Monitor WBC    Outcome: Progressing     Problem: SAFETY ADULT  Goal: Patient will remain free of falls  Description: INTERVENTIONS:  - Assess patient frequently for physical needs  -  Identify cognitive and physical deficits and behaviors that affect risk of falls.   -  [place] fall precautions as indicated by assessment.  - Educate patient/family on patient safety including physical limitations  - Instruct patient to call for assistance with activity based on assessment  - Modify environment to reduce risk of injury  - Consider OT/PT consult to assist with strengthening/mobility  Outcome: Progressing  Goal: Maintain or return to baseline ADL function  Description: INTERVENTIONS:  -  Assess patient's ability to carry out ADLs; assess patient's baseline for ADL function and identify physical deficits which impact ability to perform ADLs (bathing, care of mouth/teeth, toileting, grooming, dressing, etc.)  - Assess/evaluate cause of self-care deficits   - Assess range of motion  - Assess patient's mobility; develop plan if impaired  - Assess patient's need for assistive devices and provide as appropriate  - Encourage maximum independence but intervene and supervise when necessary  - Involve family in performance of ADLs  - Assess for home care needs following discharge   - Consider OT consult to assist with ADL evaluation and planning for discharge  - Provide patient education as appropriate  Outcome: Progressing  Goal: Maintain or return mobility status to optimal level  Description: INTERVENTIONS:  - Assess patient's baseline mobility status (ambulation, transfers, stairs, etc.)    - Identify cognitive and physical deficits and behaviors that affect mobility  - Identify mobility aids required to assist with transfers and/or ambulation (gait belt, sit-to-stand, lift, walker, cane, etc.)  - [place] fall precautions as indicated by assessment  - Record patient progress and toleration of activity level on Mobility SBAR; progress patient to next Phase/Stage  - Instruct patient to call for assistance with activity based on assessment  - Consider rehabilitation consult to assist with strengthening/weightbearing, etc.  Outcome: Progressing     Problem: DISCHARGE PLANNING  Goal: Discharge to home or other facility with appropriate resources  Description: INTERVENTIONS:  - Identify barriers to discharge w/patient and caregiver  - Arrange for needed discharge resources and transportation as appropriate  - Identify discharge learning needs (meds, wound care, etc.)  - Arrange for interpretive services to assist at discharge as needed  - Refer to Case Management Department for coordinating discharge planning if the patient needs post-hospital services based on physician/advanced practitioner order or complex needs related to functional status, cognitive ability, or social support system  Outcome: Progressing     Problem: Knowledge Deficit  Goal: Patient/family/caregiver demonstrates understanding of disease process, treatment plan, medications, and discharge instructions  Description: Complete learning assessment and assess knowledge base. Interventions:  - Provide teaching at level of understanding  - Provide teaching via preferred learning methods  Outcome: Progressing     Problem: Nutrition/Hydration-ADULT  Goal: Nutrient/Hydration intake appropriate for improving, restoring or maintaining nutritional needs  Description: Monitor and assess patient's nutrition/hydration status for malnutrition. Collaborate with interdisciplinary team and initiate plan and interventions as ordered. Monitor patient's weight and dietary intake as ordered or per policy. Utilize nutrition screening tool and intervene as necessary. Determine patient's food preferences and provide high-protein, high-caloric foods as appropriate.      INTERVENTIONS:  - Monitor oral intake, urinary output, labs, and treatment plans  - Assess nutrition and hydration status and recommend course of action  - Evaluate amount of meals eaten  - Assist patient with eating if necessary   - Allow adequate time for meals  - Recommend/ encourage appropriate diets, oral nutritional supplements, and vitamin/mineral supplements  - Order, calculate, and assess calorie counts as needed  - Recommend, monitor, and adjust tube feedings and TPN/PPN based on assessed needs  - Assess need for intravenous fluids  - Provide specific nutrition/hydration education as appropriate  - Include patient/family/caregiver in decisions related to nutrition  Outcome: Progressing     Problem: GASTROINTESTINAL - ADULT  Goal: Maintains or returns to baseline bowel function  Description: INTERVENTIONS:  - Assess bowel function  - Encourage oral fluids to ensure adequate hydration  - Administer IV fluids if ordered to ensure adequate hydration  - Administer ordered medications as needed  - Encourage mobilization and activity  - Consider nutritional services referral to assist patient with adequate nutrition and appropriate food choices  Outcome: Progressing     Problem: SKIN/TISSUE INTEGRITY - ADULT  Goal: Skin integrity remains intact  Description: INTERVENTIONS  - Identify patients at risk for skin breakdown  - Assess and monitor skin integrity  - Assess and monitor nutrition and hydration status  - Monitor labs (i.e. albumin)  - Assess for incontinence   - Turn and reposition patient  - Assist with mobility/ambulation  - Relieve pressure over bony prominences  - Avoid friction and shearing  - Provide appropriate hygiene as needed including keeping skin clean and dry  - Evaluate need for skin moisturizer/barrier cream  - Collaborate with interdisciplinary team (i.e. Nutrition, Rehabilitation, etc.)   - Patient/family teaching  Outcome: Progressing     Problem: GENITOURINARY - ADULT  Goal: Urinary catheter remains patent  Description: INTERVENTIONS:  - Assess patency of condom catheter     Outcome: Progressing     Problem: CONFUSION/THOUGHT DISTURBANCE  Goal: Thought disturbances (confusion, delirium, depression, dementia or psychosis) are managed to maintain or return to baseline mental status and functional level  Description: INTERVENTIONS:  - Assess for possible contributors to  thought disturbance, including but not limited to medications, infection, impaired vision or hearing, underlying metabolic abnormalities, dehydration, respiratory compromise,  psychiatric diagnoses and notify attending PHYSICAN/AP  - Monitor and intervene to maintain adequate nutrition, hydration, elimination, sleep and activity  - Decrease environmental stimuli, including noise as appropriate. - Provide frequent contacts to provide refocusing, direction and reassurance as needed. Approach patient calmly with eye contact and at their level.   - [place] high risk fall precautions, aspiration precautions and other safety measures, as indicated  - If delirium suspected, notify physician/AP of change in condition and request immediate in-person evaluation  - Pursue consults as appropriate including Geriatric (campus dependent), OT for cognitive evaluation/activity planning, psychiatric, pastoral care, etc.  Outcome: Progressing

## 2020-10-29 ENCOUNTER — TELEPHONE (OUTPATIENT)
Dept: NEPHROLOGY | Facility: CLINIC | Age: 70
End: 2020-10-29

## 2020-10-30 ENCOUNTER — DOCUMENTATION (OUTPATIENT)
Dept: INFECTIOUS DISEASES | Facility: CLINIC | Age: 70
End: 2020-10-30

## 2020-10-30 ENCOUNTER — LAB REQUISITION (OUTPATIENT)
Dept: LAB | Facility: HOSPITAL | Age: 70
End: 2020-10-30
Payer: COMMERCIAL

## 2020-10-30 DIAGNOSIS — R78.81 BACTEREMIA: ICD-10-CM

## 2020-10-30 DIAGNOSIS — B95.62 METHICILLIN RESISTANT STAPHYLOCOCCUS AUREUS INFECTION AS THE CAUSE OF DISEASES CLASSIFIED ELSEWHERE: ICD-10-CM

## 2020-10-30 DIAGNOSIS — R78.81 MRSA BACTEREMIA: Primary | ICD-10-CM

## 2020-10-30 DIAGNOSIS — B95.62 MRSA BACTEREMIA: Primary | ICD-10-CM

## 2020-10-30 LAB
ANION GAP SERPL CALCULATED.3IONS-SCNC: 5 MMOL/L (ref 4–13)
BASOPHILS # BLD AUTO: 0.05 THOUSANDS/ΜL (ref 0–0.1)
BASOPHILS NFR BLD AUTO: 1 % (ref 0–1)
BUN SERPL-MCNC: 21 MG/DL (ref 5–25)
CALCIUM SERPL-MCNC: 8.6 MG/DL (ref 8.3–10.1)
CHLORIDE SERPL-SCNC: 105 MMOL/L (ref 100–108)
CO2 SERPL-SCNC: 28 MMOL/L (ref 21–32)
CREAT SERPL-MCNC: 1.45 MG/DL (ref 0.6–1.3)
EOSINOPHIL # BLD AUTO: 0.3 THOUSAND/ΜL (ref 0–0.61)
EOSINOPHIL NFR BLD AUTO: 4 % (ref 0–6)
ERYTHROCYTE [DISTWIDTH] IN BLOOD BY AUTOMATED COUNT: 13.1 % (ref 11.6–15.1)
GFR SERPL CREATININE-BSD FRML MDRD: 48 ML/MIN/1.73SQ M
GLUCOSE SERPL-MCNC: 123 MG/DL (ref 65–140)
HCT VFR BLD AUTO: 28.4 % (ref 36.5–49.3)
HGB BLD-MCNC: 8.7 G/DL (ref 12–17)
IMM GRANULOCYTES # BLD AUTO: 0.02 THOUSAND/UL (ref 0–0.2)
IMM GRANULOCYTES NFR BLD AUTO: 0 % (ref 0–2)
LYMPHOCYTES # BLD AUTO: 2.08 THOUSANDS/ΜL (ref 0.6–4.47)
LYMPHOCYTES NFR BLD AUTO: 31 % (ref 14–44)
MCH RBC QN AUTO: 29.5 PG (ref 26.8–34.3)
MCHC RBC AUTO-ENTMCNC: 30.6 G/DL (ref 31.4–37.4)
MCV RBC AUTO: 96 FL (ref 82–98)
MONOCYTES # BLD AUTO: 0.54 THOUSAND/ΜL (ref 0.17–1.22)
MONOCYTES NFR BLD AUTO: 8 % (ref 4–12)
NEUTROPHILS # BLD AUTO: 3.84 THOUSANDS/ΜL (ref 1.85–7.62)
NEUTS SEG NFR BLD AUTO: 56 % (ref 43–75)
NRBC BLD AUTO-RTO: 0 /100 WBCS
PLATELET # BLD AUTO: 198 THOUSANDS/UL (ref 149–390)
PMV BLD AUTO: 11.9 FL (ref 8.9–12.7)
POTASSIUM SERPL-SCNC: 4.8 MMOL/L (ref 3.5–5.3)
RBC # BLD AUTO: 2.95 MILLION/UL (ref 3.88–5.62)
SODIUM SERPL-SCNC: 138 MMOL/L (ref 136–145)
VANCOMYCIN TROUGH SERPL-MCNC: 24.9 UG/ML (ref 10–20)
WBC # BLD AUTO: 6.83 THOUSAND/UL (ref 4.31–10.16)

## 2020-10-30 PROCEDURE — 80048 BASIC METABOLIC PNL TOTAL CA: CPT | Performed by: INTERNAL MEDICINE

## 2020-10-30 PROCEDURE — 85025 COMPLETE CBC W/AUTO DIFF WBC: CPT | Performed by: INTERNAL MEDICINE

## 2020-10-30 PROCEDURE — 80202 ASSAY OF VANCOMYCIN: CPT | Performed by: INTERNAL MEDICINE

## 2020-11-03 ENCOUNTER — LAB REQUISITION (OUTPATIENT)
Dept: LAB | Facility: HOSPITAL | Age: 70
End: 2020-11-03
Payer: COMMERCIAL

## 2020-11-03 DIAGNOSIS — B95.62 METHICILLIN RESISTANT STAPHYLOCOCCUS AUREUS INFECTION AS THE CAUSE OF DISEASES CLASSIFIED ELSEWHERE: ICD-10-CM

## 2020-11-03 LAB
CREAT SERPL-MCNC: 1.31 MG/DL (ref 0.6–1.3)
GFR SERPL CREATININE-BSD FRML MDRD: 54 ML/MIN/1.73SQ M
VANCOMYCIN TROUGH SERPL-MCNC: 16.2 UG/ML (ref 10–20)

## 2020-11-03 PROCEDURE — 80202 ASSAY OF VANCOMYCIN: CPT | Performed by: INTERNAL MEDICINE

## 2020-11-03 PROCEDURE — 82565 ASSAY OF CREATININE: CPT | Performed by: INTERNAL MEDICINE

## 2020-11-06 ENCOUNTER — HOSPITAL ENCOUNTER (EMERGENCY)
Facility: HOSPITAL | Age: 70
Discharge: HOME | End: 2020-11-06
Attending: EMERGENCY MEDICINE | Admitting: EMERGENCY MEDICINE
Payer: COMMERCIAL

## 2020-11-06 ENCOUNTER — APPOINTMENT (EMERGENCY)
Dept: RADIOLOGY | Facility: HOSPITAL | Age: 70
End: 2020-11-06
Attending: RADIOLOGY
Payer: COMMERCIAL

## 2020-11-06 VITALS
HEART RATE: 58 BPM | RESPIRATION RATE: 16 BRPM | DIASTOLIC BLOOD PRESSURE: 63 MMHG | TEMPERATURE: 97.6 F | OXYGEN SATURATION: 100 % | BODY MASS INDEX: 20.86 KG/M2 | WEIGHT: 133.16 LBS | SYSTOLIC BLOOD PRESSURE: 136 MMHG

## 2020-11-06 DIAGNOSIS — R78.81 MRSA BACTEREMIA: ICD-10-CM

## 2020-11-06 DIAGNOSIS — B95.62 MRSA BACTEREMIA: ICD-10-CM

## 2020-11-06 DIAGNOSIS — Z45.2 ENCOUNTER FOR ASSESSMENT OF PERIPHERALLY INSERTED CENTRAL VENOUS CATHETER (PICC): Primary | ICD-10-CM

## 2020-11-06 LAB
ANION GAP SERPL CALCULATED.3IONS-SCNC: 8 MMOL/L (ref 4–13)
BASOPHILS # BLD AUTO: 0.07 THOUSANDS/ΜL (ref 0–0.1)
BASOPHILS NFR BLD AUTO: 1 % (ref 0–1)
BUN SERPL-MCNC: 42 MG/DL (ref 5–25)
CALCIUM SERPL-MCNC: 9.2 MG/DL (ref 8.3–10.1)
CHLORIDE SERPL-SCNC: 103 MMOL/L (ref 100–108)
CO2 SERPL-SCNC: 25 MMOL/L (ref 21–32)
CREAT SERPL-MCNC: 1.43 MG/DL (ref 0.6–1.3)
EOSINOPHIL # BLD AUTO: 0.41 THOUSAND/ΜL (ref 0–0.61)
EOSINOPHIL NFR BLD AUTO: 6 % (ref 0–6)
ERYTHROCYTE [DISTWIDTH] IN BLOOD BY AUTOMATED COUNT: 12.8 % (ref 11.6–15.1)
GFR SERPL CREATININE-BSD FRML MDRD: 49 ML/MIN/1.73SQ M
GLUCOSE SERPL-MCNC: 140 MG/DL (ref 65–140)
HCT VFR BLD AUTO: 30.2 % (ref 36.5–49.3)
HGB BLD-MCNC: 9.2 G/DL (ref 12–17)
IMM GRANULOCYTES # BLD AUTO: 0.02 THOUSAND/UL (ref 0–0.2)
IMM GRANULOCYTES NFR BLD AUTO: 0 % (ref 0–2)
LYMPHOCYTES # BLD AUTO: 1.44 THOUSANDS/ΜL (ref 0.6–4.47)
LYMPHOCYTES NFR BLD AUTO: 20 % (ref 14–44)
MCH RBC QN AUTO: 29.4 PG (ref 26.8–34.3)
MCHC RBC AUTO-ENTMCNC: 30.5 G/DL (ref 31.4–37.4)
MCV RBC AUTO: 97 FL (ref 82–98)
MONOCYTES # BLD AUTO: 0.49 THOUSAND/ΜL (ref 0.17–1.22)
MONOCYTES NFR BLD AUTO: 7 % (ref 4–12)
NEUTROPHILS # BLD AUTO: 4.97 THOUSANDS/ΜL (ref 1.85–7.62)
NEUTS SEG NFR BLD AUTO: 66 % (ref 43–75)
NRBC BLD AUTO-RTO: 0 /100 WBCS
PLATELET # BLD AUTO: 141 THOUSANDS/UL (ref 149–390)
PMV BLD AUTO: 11 FL (ref 8.9–12.7)
POTASSIUM SERPL-SCNC: 4.9 MMOL/L (ref 3.5–5.3)
POTASSIUM SERPL-SCNC: 5.5 MMOL/L (ref 3.5–5.3)
RBC # BLD AUTO: 3.13 MILLION/UL (ref 3.88–5.62)
SODIUM SERPL-SCNC: 136 MMOL/L (ref 136–145)
WBC # BLD AUTO: 7.4 THOUSAND/UL (ref 4.31–10.16)

## 2020-11-06 PROCEDURE — 85025 COMPLETE CBC W/AUTO DIFF WBC: CPT | Performed by: EMERGENCY MEDICINE

## 2020-11-06 PROCEDURE — 84132 ASSAY OF SERUM POTASSIUM: CPT | Performed by: EMERGENCY MEDICINE

## 2020-11-06 PROCEDURE — 99284 EMERGENCY DEPT VISIT MOD MDM: CPT

## 2020-11-06 PROCEDURE — C1751 CATH, INF, PER/CENT/MIDLINE: HCPCS

## 2020-11-06 PROCEDURE — 36415 COLL VENOUS BLD VENIPUNCTURE: CPT | Performed by: EMERGENCY MEDICINE

## 2020-11-06 PROCEDURE — 99284 EMERGENCY DEPT VISIT MOD MDM: CPT | Performed by: EMERGENCY MEDICINE

## 2020-11-06 PROCEDURE — 80048 BASIC METABOLIC PNL TOTAL CA: CPT | Performed by: EMERGENCY MEDICINE

## 2020-11-06 PROCEDURE — 96365 THER/PROPH/DIAG IV INF INIT: CPT

## 2020-11-06 PROCEDURE — NC001 PR NO CHARGE: Performed by: RADIOLOGY

## 2020-11-06 RX ADMIN — VANCOMYCIN HYDROCHLORIDE 750 MG: 750 INJECTION, SOLUTION INTRAVENOUS at 10:31

## 2020-11-10 ENCOUNTER — TELEPHONE (OUTPATIENT)
Dept: INFECTIOUS DISEASES | Facility: CLINIC | Age: 70
End: 2020-11-10

## 2020-11-13 ENCOUNTER — LAB REQUISITION (OUTPATIENT)
Dept: LAB | Facility: HOSPITAL | Age: 70
End: 2020-11-13
Payer: COMMERCIAL

## 2020-11-13 DIAGNOSIS — R78.81 MRSA BACTEREMIA: Primary | ICD-10-CM

## 2020-11-13 DIAGNOSIS — N39.0 URINARY TRACT INFECTION, SITE NOT SPECIFIED: ICD-10-CM

## 2020-11-13 DIAGNOSIS — A41.02 SEPSIS DUE TO METHICILLIN RESISTANT STAPHYLOCOCCUS AUREUS (HCC): ICD-10-CM

## 2020-11-13 DIAGNOSIS — B95.62 MRSA BACTEREMIA: Primary | ICD-10-CM

## 2020-11-13 LAB
BASOPHILS # BLD AUTO: 0.07 THOUSANDS/ΜL (ref 0–0.1)
BASOPHILS NFR BLD AUTO: 1 % (ref 0–1)
CREAT SERPL-MCNC: 1.34 MG/DL (ref 0.6–1.3)
EOSINOPHIL # BLD AUTO: 0.7 THOUSAND/ΜL (ref 0–0.61)
EOSINOPHIL NFR BLD AUTO: 10 % (ref 0–6)
ERYTHROCYTE [DISTWIDTH] IN BLOOD BY AUTOMATED COUNT: 13 % (ref 11.6–15.1)
GFR SERPL CREATININE-BSD FRML MDRD: 53 ML/MIN/1.73SQ M
HCT VFR BLD AUTO: 30.8 % (ref 36.5–49.3)
HGB BLD-MCNC: 9.5 G/DL (ref 12–17)
IMM GRANULOCYTES # BLD AUTO: 0.01 THOUSAND/UL (ref 0–0.2)
IMM GRANULOCYTES NFR BLD AUTO: 0 % (ref 0–2)
LYMPHOCYTES # BLD AUTO: 1.95 THOUSANDS/ΜL (ref 0.6–4.47)
LYMPHOCYTES NFR BLD AUTO: 28 % (ref 14–44)
MCH RBC QN AUTO: 29.6 PG (ref 26.8–34.3)
MCHC RBC AUTO-ENTMCNC: 30.8 G/DL (ref 31.4–37.4)
MCV RBC AUTO: 96 FL (ref 82–98)
MONOCYTES # BLD AUTO: 0.49 THOUSAND/ΜL (ref 0.17–1.22)
MONOCYTES NFR BLD AUTO: 7 % (ref 4–12)
NEUTROPHILS # BLD AUTO: 3.7 THOUSANDS/ΜL (ref 1.85–7.62)
NEUTS SEG NFR BLD AUTO: 54 % (ref 43–75)
NRBC BLD AUTO-RTO: 0 /100 WBCS
PLATELET # BLD AUTO: 201 THOUSANDS/UL (ref 149–390)
PMV BLD AUTO: 11.3 FL (ref 8.9–12.7)
RBC # BLD AUTO: 3.21 MILLION/UL (ref 3.88–5.62)
VANCOMYCIN TROUGH SERPL-MCNC: 15.5 UG/ML (ref 10–20)
WBC # BLD AUTO: 6.92 THOUSAND/UL (ref 4.31–10.16)

## 2020-11-13 PROCEDURE — 85025 COMPLETE CBC W/AUTO DIFF WBC: CPT | Performed by: INTERNAL MEDICINE

## 2020-11-13 PROCEDURE — 82565 ASSAY OF CREATININE: CPT | Performed by: INTERNAL MEDICINE

## 2020-11-13 PROCEDURE — 80202 ASSAY OF VANCOMYCIN: CPT | Performed by: INTERNAL MEDICINE

## 2020-12-03 DIAGNOSIS — E78.5 HYPERLIPIDEMIA, UNSPECIFIED HYPERLIPIDEMIA TYPE: ICD-10-CM

## 2020-12-04 RX ORDER — ROSUVASTATIN CALCIUM 20 MG/1
TABLET, COATED ORAL
Qty: 30 TABLET | Refills: 0 | OUTPATIENT
Start: 2020-12-04

## 2021-01-19 DIAGNOSIS — I10 ESSENTIAL HYPERTENSION: ICD-10-CM

## 2021-01-27 RX ORDER — AMLODIPINE BESYLATE 5 MG/1
TABLET ORAL
Qty: 30 TABLET | Refills: 0 | OUTPATIENT
Start: 2021-01-27

## 2023-06-08 NOTE — PROGRESS NOTES
Vancomycin Monitoring    Demographics   Name Kristie Foster    Age / Height / BMI 69 y.o.  /  5-7  / Body mass index is 21.06 kg/m². Weight / Dosing Weight Weight (last 2 days)       Date/Time   Weight    10/18/20 0559   61 (134.48)    10/17/20 0600   60.1 (132.5)    10/16/20 0924   59.6 (131.39)    10/16/20 0059   59.1 (130.29)               61 kg   Vanco Days of Therapy 1   Goal Level 15-20   Indication Bacteremia - UTI   Consult Prescriber Nicolás   Additional Notes            Assessment   Current Dose/Freq. 1,000 q24h   Last Vanco Level N/a   Cultures Results from last 7 days   Lab Units 10/15/20  2055   GRAM STAIN RESULT  Gram positive cocci in clusters*       Results from last 7 days   Lab Units 10/15/20  2105 10/15/20  2055   BLOOD CULTURE  No Growth at 48 hrs. Staphylococcus aureus*       Invalid input(s): LABAEARO            MRSA+   Creatinine (uncorrected) Results from last 7 days   Lab Units 10/18/20  0907 10/17/20  0728 10/16/20  0502 10/15/20  2100   CREATININE mg/dL 1.41* 1.61* 2.40* 2.86*      Additional Notes            Plan   Dose Adjustments Made 1250 mg q24h starting tomorrow   Vanco Level Ordered 10/21 0545 prior to 4th total dose   Additional Labs Ordered    Additional Notes              Pharmacy will continue to follow closely for s/sx of nephrotoxicity, infusion reactions and appropriateness of therapy as well as patient's culture results and clinical progress daily until medication is d/c'ed. Labs will be ordered if needed per protocol.    Manuel Shin, PharmD oral